# Patient Record
Sex: MALE | Race: WHITE | NOT HISPANIC OR LATINO | Employment: FULL TIME | URBAN - METROPOLITAN AREA
[De-identification: names, ages, dates, MRNs, and addresses within clinical notes are randomized per-mention and may not be internally consistent; named-entity substitution may affect disease eponyms.]

---

## 2017-05-18 ENCOUNTER — LAB VISIT (OUTPATIENT)
Dept: LAB | Facility: HOSPITAL | Age: 57
End: 2017-05-18
Attending: UROLOGY
Payer: COMMERCIAL

## 2017-05-18 ENCOUNTER — OFFICE VISIT (OUTPATIENT)
Dept: UROLOGY | Facility: CLINIC | Age: 57
End: 2017-05-18
Payer: COMMERCIAL

## 2017-05-18 VITALS
HEIGHT: 74 IN | WEIGHT: 260.13 LBS | DIASTOLIC BLOOD PRESSURE: 82 MMHG | SYSTOLIC BLOOD PRESSURE: 136 MMHG | HEART RATE: 103 BPM | BODY MASS INDEX: 33.38 KG/M2

## 2017-05-18 DIAGNOSIS — N40.0 BPH WITHOUT URINARY OBSTRUCTION: ICD-10-CM

## 2017-05-18 DIAGNOSIS — R97.20 ELEVATED PSA: ICD-10-CM

## 2017-05-18 DIAGNOSIS — R79.89 LOW TESTOSTERONE: ICD-10-CM

## 2017-05-18 DIAGNOSIS — N52.9 IMPOTENCE: ICD-10-CM

## 2017-05-18 DIAGNOSIS — R97.20 ELEVATED PSA: Primary | ICD-10-CM

## 2017-05-18 LAB
BILIRUB SERPL-MCNC: ABNORMAL MG/DL
BLOOD URINE, POC: ABNORMAL
COLOR, POC UA: YELLOW
COMPLEXED PSA SERPL-MCNC: 4.6 NG/ML
GLUCOSE UR QL STRIP: 1000
KETONES UR QL STRIP: ABNORMAL
LEUKOCYTE ESTERASE URINE, POC: ABNORMAL
NITRITE, POC UA: ABNORMAL
PH, POC UA: 6
PROTEIN, POC: ABNORMAL
SPECIFIC GRAVITY, POC UA: 1.01
UROBILINOGEN, POC UA: ABNORMAL

## 2017-05-18 PROCEDURE — 99204 OFFICE O/P NEW MOD 45 MIN: CPT | Mod: 25,S$GLB,, | Performed by: UROLOGY

## 2017-05-18 PROCEDURE — 84153 ASSAY OF PSA TOTAL: CPT

## 2017-05-18 PROCEDURE — 36415 COLL VENOUS BLD VENIPUNCTURE: CPT | Mod: PO

## 2017-05-18 PROCEDURE — 1160F RVW MEDS BY RX/DR IN RCRD: CPT | Mod: S$GLB,,, | Performed by: UROLOGY

## 2017-05-18 PROCEDURE — 99999 PR PBB SHADOW E&M-NEW PATIENT-LVL II: CPT | Mod: PBBFAC,,, | Performed by: UROLOGY

## 2017-05-18 PROCEDURE — 81002 URINALYSIS NONAUTO W/O SCOPE: CPT | Mod: S$GLB,,, | Performed by: UROLOGY

## 2017-05-18 RX ORDER — VALSARTAN 160 MG/1
160 TABLET ORAL DAILY
COMMUNITY
Start: 2017-05-12

## 2017-05-18 RX ORDER — INSULIN ASPART 100 [IU]/ML
INJECTION, SOLUTION INTRAVENOUS; SUBCUTANEOUS
COMMUNITY
Start: 2017-02-09 | End: 2017-07-19

## 2017-05-18 RX ORDER — GLIMEPIRIDE 4 MG/1
TABLET ORAL
COMMUNITY
Start: 2017-02-22 | End: 2022-05-25 | Stop reason: ALTCHOICE

## 2017-05-18 RX ORDER — LANCETS 28 GAUGE
EACH MISCELLANEOUS
COMMUNITY
Start: 2017-05-18 | End: 2022-07-20

## 2017-05-18 RX ORDER — LORAZEPAM 0.5 MG/1
0.5 TABLET ORAL EVERY 6 HOURS PRN
COMMUNITY
End: 2022-05-25 | Stop reason: ALTCHOICE

## 2017-05-18 RX ORDER — TESTOSTERONE CYPIONATE 200 MG/ML
200 INJECTION, SOLUTION INTRAMUSCULAR
COMMUNITY
Start: 2017-04-10 | End: 2017-05-25

## 2017-05-18 RX ORDER — ONDANSETRON HYDROCHLORIDE 8 MG/1
8 TABLET, FILM COATED ORAL
COMMUNITY
End: 2017-06-14

## 2017-05-18 RX ORDER — ATOMOXETINE HYDROCHLORIDE 80 MG/1
CAPSULE ORAL
COMMUNITY
Start: 2017-04-05 | End: 2022-05-25 | Stop reason: ALTCHOICE

## 2017-05-18 RX ORDER — BUPROPION HYDROCHLORIDE 300 MG/1
TABLET ORAL
COMMUNITY
Start: 2017-03-01 | End: 2022-05-25 | Stop reason: ALTCHOICE

## 2017-05-18 RX ORDER — CANAGLIFLOZIN 300 MG/1
TABLET, FILM COATED ORAL
COMMUNITY
Start: 2017-02-27 | End: 2022-05-25 | Stop reason: ALTCHOICE

## 2017-05-18 RX ORDER — BLOOD-GLUCOSE METER
KIT MISCELLANEOUS
COMMUNITY
Start: 2017-05-18 | End: 2022-07-20

## 2017-05-18 RX ORDER — DULAGLUTIDE 0.75 MG/.5ML
INJECTION, SOLUTION SUBCUTANEOUS
COMMUNITY
Start: 2017-03-22 | End: 2017-05-18

## 2017-05-18 RX ORDER — PEN NEEDLE, DIABETIC 31 GX5/16"
NEEDLE, DISPOSABLE MISCELLANEOUS
COMMUNITY
Start: 2017-05-18 | End: 2022-07-20

## 2017-05-18 RX ORDER — INSULIN DETEMIR 100 [IU]/ML
24 INJECTION, SOLUTION SUBCUTANEOUS 2 TIMES DAILY
COMMUNITY
Start: 2017-03-27 | End: 2017-10-16

## 2017-05-18 RX ORDER — INDOMETHACIN 75 MG/1
CAPSULE, EXTENDED RELEASE ORAL
COMMUNITY
Start: 2017-05-15 | End: 2022-05-25 | Stop reason: ALTCHOICE

## 2017-05-18 NOTE — LETTER
May 18, 2017      Jesu Chaparro MD  [address] [facility]  [address]  [facility] [address]           [facility]  [address] [facility] [address] [place] [address]  Phone: [phone]          Patient: Rickey Rios   MR Number: [id number]   YOB: 1960   Date of Visit: 5/18/2017       Dear Dr. Jesu Chaparro:    Thank you for referring Rickey Rios to me for evaluation. Attached you will find relevant portions of my assessment and plan of care.    If you have questions, please do not hesitate to call me. I look forward to following Rickey Rios along with you.    Sincerely,    TORITO Tobias MD    Enclosure  CC:  No Recipients    If you would like to receive this communication electronically, please contact [email or web address] or [phone] to request more information on [employer] Link access.    For providers and/or their staff who would like to refer a patient to [facility], please contact us through our one-stop-shop provider referral line, [facility] , at [phone].    If you feel you have received this communication in error or would no longer like to receive these types of communications, please e-mail [email or web address]

## 2017-05-18 NOTE — PROGRESS NOTES
"Subjective:       Patient ID: Rickey Rios is a 56 y.o. male.    Chief Complaint: No chief complaint on file.    HPI     56 year old with elevated PSA.  His PSA increased from 1.4 to 13.6 in last 6 months.  He started testosterone injections approximately 6 months ago.  Current dose is 200 mg every 2 weeks.  Treated by Dr. Chaparro.  He has no family history of prostate cancer.   He has no bothersome voiding symptoms.  He denies hematuria and dysuria.  No previous urinary tract infection.  UA is clear today.  He also has ED.  No previous PDE5i.  He takes no nitrates.    Urine dipstick shows negative for all components.    PMH:  Diabetes,  Ankylosing spondylitis      Current Outpatient Prescriptions:     BD ULTRA-FINE BERNARDO PEN NEEDLES 32 gauge x 5/32" Ndle, , Disp: , Rfl:     buPROPion (WELLBUTRIN XL) 300 MG 24 hr tablet, , Disp: , Rfl:     FREESTYLE LANCETS 28 gauge lancets, , Disp: , Rfl:     FREESTYLE LITE STRIPS Strp, , Disp: , Rfl:     glimepiride (AMARYL) 4 MG tablet, , Disp: , Rfl:     indomethacin (INDOCIN SR) 75 mg CpSR CR capsule, , Disp: , Rfl:     INVOKANA 300 mg Tab tablet, , Disp: , Rfl:     LEVEMIR FLEXTOUCH 100 unit/mL (3 mL) InPn pen, , Disp: , Rfl:     lorazepam (ATIVAN) 0.5 MG tablet, Take 0.5 mg by mouth every 6 (six) hours as needed for Anxiety., Disp: , Rfl:     NOVOLOG FLEXPEN 100 unit/mL InPn pen, , Disp: , Rfl:     ondansetron (ZOFRAN) 8 MG tablet, Take 8 mg by mouth every 8 (eight) hours., Disp: , Rfl:     STRATTERA 80 mg capsule, , Disp: , Rfl:     testosterone cypionate (DEPOTESTOTERONE CYPIONATE) 200 mg/mL injection, Inject 200 mg into the muscle every 14 (fourteen) days. , Disp: , Rfl:     valsartan (DIOVAN) 160 MG tablet, , Disp: , Rfl:     Review of Systems   Constitutional: Negative for fever.   Eyes: Negative for visual disturbance.   Respiratory: Negative for shortness of breath.    Cardiovascular: Negative for chest pain.   Gastrointestinal: Negative for nausea. "   Genitourinary: Negative for dysuria and hematuria.   Musculoskeletal: Negative for gait problem.   Skin: Negative for rash.   Neurological: Negative for seizures.   Psychiatric/Behavioral: Negative for confusion.       Objective:      Physical Exam   Constitutional: He is oriented to person, place, and time. He appears well-developed and well-nourished.   HENT:   Head: Normocephalic and atraumatic.   Eyes: Conjunctivae are normal.   Cardiovascular: Normal rate.    Pulmonary/Chest: Effort normal.   Abdominal: Hernia confirmed negative in the right inguinal area and confirmed negative in the left inguinal area.   Genitourinary: Testes normal and penis normal. Rectal exam shows no mass and anal tone normal. Prostate is enlarged (40g s/s/a). Prostate is not tender.   Musculoskeletal: Normal range of motion. He exhibits no edema.   Lymphadenopathy: No inguinal adenopathy noted on the right or left side.   Neurological: He is alert and oriented to person, place, and time.   Skin: Skin is warm and dry. No rash noted.   Psychiatric: He has a normal mood and affect.   Vitals reviewed.      Assessment:       1. Elevated PSA    2. BPH without urinary obstruction    3. Low testosterone    4. Impotence        Plan:       Elevated PSA  -     POCT urine dipstick without microscope  -     Prostate Specific Antigen, Diagnostic; Future; Expected date: 5/18/17    BPH without urinary obstruction    Low testosterone    Impotence      Rise in PSA in unusual for prostate cancer.  No symptoms to suggest infection.   I will repeat PSA to r/o lab error.  If PSA is at same level, I have recommended prostate needle biopsy.  I explained the procedure.  Hold testosterone injections for now.  He is interested in a trial of Viagra but will hold until PSA problems is resolved.

## 2017-05-19 ENCOUNTER — TELEPHONE (OUTPATIENT)
Dept: UROLOGY | Facility: CLINIC | Age: 57
End: 2017-05-19

## 2017-05-19 DIAGNOSIS — R79.89 LOW TESTOSTERONE: ICD-10-CM

## 2017-05-19 DIAGNOSIS — N40.0 BENIGN PROSTATIC HYPERPLASIA, PRESENCE OF LOWER URINARY TRACT SYMPTOMS UNSPECIFIED, UNSPECIFIED MORPHOLOGY: Primary | ICD-10-CM

## 2017-05-19 DIAGNOSIS — R97.20 ELEVATED PSA: Primary | ICD-10-CM

## 2017-05-19 DIAGNOSIS — N40.0 BENIGN PROSTATIC HYPERPLASIA, PRESENCE OF LOWER URINARY TRACT SYMPTOMS UNSPECIFIED, UNSPECIFIED MORPHOLOGY: ICD-10-CM

## 2017-05-19 RX ORDER — SILDENAFIL CITRATE 20 MG/1
20 TABLET ORAL CONTINUOUS PRN
Qty: 30 TABLET | Refills: 11 | Status: SHIPPED | OUTPATIENT
Start: 2017-05-19 | End: 2017-05-19 | Stop reason: SDUPTHER

## 2017-05-19 RX ORDER — SILDENAFIL CITRATE 20 MG/1
20 TABLET ORAL CONTINUOUS PRN
Qty: 30 TABLET | Refills: 11 | Status: SHIPPED | OUTPATIENT
Start: 2017-05-19 | End: 2018-06-13 | Stop reason: SDUPTHER

## 2017-05-19 NOTE — TELEPHONE ENCOUNTER
Called the prescription into [facility]. It was sent to [facility] by mistake. Spoke to pt's wife and informed her of the medication being at [facility] now.

## 2017-05-19 NOTE — TELEPHONE ENCOUNTER
----- Message from Bruno Ortega sent at 5/19/2017  1:10 PM CDT -----  Contact: Rickey  Call back to [phone]. Says he is at [facility] and they do not have anything.

## 2017-05-19 NOTE — TELEPHONE ENCOUNTER
----- Message from TORITO Tobias MD sent at 5/19/2017  8:41 AM CDT -----  Call with PSA.  Decreased but still elevated.  Rec.  Repeat PSA in 2 months.  Continue to hold testosterone injections for now.

## 2017-05-19 NOTE — TELEPHONE ENCOUNTER
Spoke to pt and informed him of his test results pt verbalized understanding. Pt would like to get a prescription for the Revatio 20 mg tablets from [facility]. Pt states he spoke to you about this. Please advise.

## 2017-05-19 NOTE — TELEPHONE ENCOUNTER
----- Message from Michele Roque sent at 5/19/2017  3:03 PM CDT -----  Contact: Patient  Patient returning call. Please call back at [phone]. Thanks,

## 2017-05-25 ENCOUNTER — OFFICE VISIT (OUTPATIENT)
Dept: UROLOGY | Facility: CLINIC | Age: 57
End: 2017-05-25
Payer: COMMERCIAL

## 2017-05-25 VITALS
DIASTOLIC BLOOD PRESSURE: 93 MMHG | WEIGHT: 258.63 LBS | SYSTOLIC BLOOD PRESSURE: 141 MMHG | HEART RATE: 98 BPM | HEIGHT: 74 IN | BODY MASS INDEX: 33.19 KG/M2

## 2017-05-25 DIAGNOSIS — N48.6 PEYRONIE'S DISEASE: Primary | ICD-10-CM

## 2017-05-25 PROCEDURE — 99999 PR PBB SHADOW E&M-EST. PATIENT-LVL II: CPT | Mod: PBBFAC,,, | Performed by: UROLOGY

## 2017-05-25 PROCEDURE — 99213 OFFICE O/P EST LOW 20 MIN: CPT | Mod: S$GLB,,, | Performed by: UROLOGY

## 2017-05-25 NOTE — PROGRESS NOTES
Subjective:       Patient ID: Rickey Rios is a 56 y.o. male.    Chief Complaint: Lump (in testicle)    HPI     56 year old seen recently for elevated PSA.  Exam at that time was unremarkable.  He has a new complaint.  He feels a knot at the base of his penis.  He first noticed only last noght.  It is non painful.  He does note some curvature of his penis but this has been a chronic problem.  Erections are not painful.  Can have satisfactory intercourse.    Review of Systems   Constitutional: Negative for fever.   Genitourinary: Negative for dysuria and hematuria.       Objective:      Physical Exam   Constitutional: He is oriented to person, place, and time. He appears well-developed and well-nourished.   Pulmonary/Chest: Effort normal.   Abdominal: Soft.   Genitourinary: Testes normal.   Genitourinary Comments: Palpable plaque at base of penis on right side   Lymphadenopathy: No inguinal adenopathy noted on the right side.   Neurological: He is alert and oriented to person, place, and time.   Skin: No rash noted.   Psychiatric: He has a normal mood and affect.   Vitals reviewed.      Assessment:       1. Peyronie's disease        Plan:       Peyronie's disease        Discussed treatment options.  Symptoms are mild.  Will observe for now.  Keep f/u as planned for repeat PSA.

## 2017-06-14 PROBLEM — E11.9 TYPE 2 DIABETES MELLITUS WITHOUT COMPLICATION, WITHOUT LONG-TERM CURRENT USE OF INSULIN: Status: RESOLVED | Noted: 2017-06-14 | Resolved: 2017-06-14

## 2017-06-14 PROBLEM — Z79.4 TYPE 2 DIABETES MELLITUS WITHOUT COMPLICATION, WITH LONG-TERM CURRENT USE OF INSULIN: Status: ACTIVE | Noted: 2017-06-14

## 2017-06-14 PROBLEM — I10 ESSENTIAL HYPERTENSION: Status: ACTIVE | Noted: 2017-06-14

## 2017-06-14 PROBLEM — M51.9 LUMBAR DISC DISEASE: Status: ACTIVE | Noted: 2017-06-14

## 2017-06-14 PROBLEM — E11.9 TYPE 2 DIABETES MELLITUS WITHOUT COMPLICATION, WITH LONG-TERM CURRENT USE OF INSULIN: Status: ACTIVE | Noted: 2017-06-14

## 2017-06-14 PROBLEM — E11.9 TYPE 2 DIABETES MELLITUS WITHOUT COMPLICATION, WITHOUT LONG-TERM CURRENT USE OF INSULIN: Status: ACTIVE | Noted: 2017-06-14

## 2017-06-14 PROBLEM — M50.90 CERVICAL DISC DISEASE: Status: ACTIVE | Noted: 2017-06-14

## 2017-07-12 ENCOUNTER — LAB VISIT (OUTPATIENT)
Dept: LAB | Facility: HOSPITAL | Age: 57
End: 2017-07-12
Attending: UROLOGY
Payer: COMMERCIAL

## 2017-07-12 DIAGNOSIS — R97.20 ELEVATED PSA: ICD-10-CM

## 2017-07-12 LAB — COMPLEXED PSA SERPL-MCNC: 5.7 NG/ML

## 2017-07-12 PROCEDURE — 84153 ASSAY OF PSA TOTAL: CPT

## 2017-07-12 PROCEDURE — 36415 COLL VENOUS BLD VENIPUNCTURE: CPT | Mod: PO

## 2017-07-19 ENCOUNTER — OFFICE VISIT (OUTPATIENT)
Dept: UROLOGY | Facility: CLINIC | Age: 57
End: 2017-07-19
Payer: COMMERCIAL

## 2017-07-19 VITALS
DIASTOLIC BLOOD PRESSURE: 80 MMHG | HEART RATE: 74 BPM | HEIGHT: 74 IN | WEIGHT: 249.13 LBS | SYSTOLIC BLOOD PRESSURE: 130 MMHG | BODY MASS INDEX: 31.97 KG/M2

## 2017-07-19 DIAGNOSIS — R97.20 ELEVATED PSA: Primary | ICD-10-CM

## 2017-07-19 DIAGNOSIS — N40.0 BENIGN PROSTATIC HYPERPLASIA, PRESENCE OF LOWER URINARY TRACT SYMPTOMS UNSPECIFIED: Primary | ICD-10-CM

## 2017-07-19 LAB
BILIRUB SERPL-MCNC: NORMAL MG/DL
BLOOD URINE, POC: NORMAL
COLOR, POC UA: YELLOW
GLUCOSE UR QL STRIP: 1000
KETONES UR QL STRIP: NORMAL
LEUKOCYTE ESTERASE URINE, POC: NORMAL
NITRITE, POC UA: NORMAL
PH, POC UA: 5
PROTEIN, POC: NORMAL
SPECIFIC GRAVITY, POC UA: 1
UROBILINOGEN, POC UA: NORMAL

## 2017-07-19 PROCEDURE — 99213 OFFICE O/P EST LOW 20 MIN: CPT | Mod: 25,S$GLB,, | Performed by: UROLOGY

## 2017-07-19 PROCEDURE — 81002 URINALYSIS NONAUTO W/O SCOPE: CPT | Mod: S$GLB,,, | Performed by: UROLOGY

## 2017-07-19 PROCEDURE — 99999 PR PBB SHADOW E&M-EST. PATIENT-LVL III: CPT | Mod: PBBFAC,,, | Performed by: UROLOGY

## 2017-07-19 RX ORDER — LEVOFLOXACIN 500 MG/1
500 TABLET, FILM COATED ORAL DAILY
Qty: 3 TABLET | Refills: 0 | Status: SHIPPED | OUTPATIENT
Start: 2017-07-19 | End: 2017-07-22

## 2017-07-19 RX ORDER — DULAGLUTIDE 0.75 MG/.5ML
INJECTION, SOLUTION SUBCUTANEOUS
COMMUNITY
Start: 2017-06-26 | End: 2017-10-16

## 2017-07-19 NOTE — PROGRESS NOTES
Subjective:       Patient ID: Rickey Rios is a 56 y.o. male.    Chief Complaint: Follow-up    HPI     56 year old with elevated PSA.  His PSA increased from 1.4 to 13.6 in last 6 months.  He started testosterone injections approximately 6 months ago.   Testosterone was held and initially PSA decreased to 4.6 but most recent PSA is increased again to 5.7.  He has no family history of prostate cancer.   He has no bothersome voiding symptoms.  He denies hematuria and dysuria.  No previous urinary tract infection.  UA is clear today.    Urine dipstick shows negative for all components.    Review of Systems   Constitutional: Negative for fever.   Genitourinary: Negative for dysuria and hematuria.       Objective:      Physical Exam   Constitutional: He is oriented to person, place, and time. He appears well-developed and well-nourished.   Pulmonary/Chest: Effort normal.   Abdominal: Soft.   Neurological: He is alert and oriented to person, place, and time.   Skin: No rash noted.   Psychiatric: He has a normal mood and affect.   Vitals reviewed.      Assessment:       1. Benign prostatic hyperplasia, presence of lower urinary tract symptoms unspecified        Plan:       Benign prostatic hyperplasia, presence of lower urinary tract symptoms unspecified  -     POCT URINE DIPSTICK WITHOUT MICROSCOPE    Other orders  -     levoFLOXacin (LEVAQUIN) 500 MG tablet; Take 1 tablet (500 mg total) by mouth once daily.  Dispense: 3 tablet; Refill: 0      Rec prostate needle biopsy.  We discussed procedure and risks.

## 2017-07-31 ENCOUNTER — ANESTHESIA EVENT (OUTPATIENT)
Dept: SURGERY | Facility: HOSPITAL | Age: 57
End: 2017-07-31
Payer: COMMERCIAL

## 2017-08-01 ENCOUNTER — ANESTHESIA (OUTPATIENT)
Dept: SURGERY | Facility: HOSPITAL | Age: 57
End: 2017-08-01
Payer: COMMERCIAL

## 2017-08-01 ENCOUNTER — HOSPITAL ENCOUNTER (OUTPATIENT)
Facility: HOSPITAL | Age: 57
Discharge: HOME | End: 2017-08-01
Attending: UROLOGY | Admitting: UROLOGY
Payer: COMMERCIAL

## 2017-08-01 DIAGNOSIS — R97.20 ELEVATED PSA: Primary | ICD-10-CM

## 2017-08-01 LAB — GLUCOSE SERPL-MCNC: 155 MG/DL (ref 70–110)

## 2017-08-01 PROCEDURE — 25000003 PHARM REV CODE 250: Mod: PO | Performed by: ANESTHESIOLOGY

## 2017-08-01 PROCEDURE — D9220A PRA ANESTHESIA: Mod: CRNA,,, | Performed by: NURSE ANESTHETIST, CERTIFIED REGISTERED

## 2017-08-01 PROCEDURE — 63600175 PHARM REV CODE 636 W HCPCS: Mod: PO | Performed by: UROLOGY

## 2017-08-01 PROCEDURE — 76942 ECHO GUIDE FOR BIOPSY: CPT | Mod: 26,59,, | Performed by: UROLOGY

## 2017-08-01 PROCEDURE — 37000008 HC ANESTHESIA 1ST 15 MINUTES: Mod: PO | Performed by: UROLOGY

## 2017-08-01 PROCEDURE — 82962 GLUCOSE BLOOD TEST: CPT | Mod: PO | Performed by: UROLOGY

## 2017-08-01 PROCEDURE — 55700 PR BIOPSY OF PROSTATE,NEEDLE/PUNCH: CPT | Mod: ,,, | Performed by: UROLOGY

## 2017-08-01 PROCEDURE — D9220A PRA ANESTHESIA: Mod: ANES,,, | Performed by: ANESTHESIOLOGY

## 2017-08-01 PROCEDURE — 88305 TISSUE EXAM BY PATHOLOGIST: CPT

## 2017-08-01 PROCEDURE — 71000033 HC RECOVERY, INTIAL HOUR: Mod: PO | Performed by: UROLOGY

## 2017-08-01 PROCEDURE — 36000705 HC OR TIME LEV I EA ADD 15 MIN: Mod: PO | Performed by: UROLOGY

## 2017-08-01 PROCEDURE — 63600175 PHARM REV CODE 636 W HCPCS: Mod: PO | Performed by: NURSE ANESTHETIST, CERTIFIED REGISTERED

## 2017-08-01 PROCEDURE — 88305 TISSUE EXAM BY PATHOLOGIST: CPT | Mod: 26,,,

## 2017-08-01 PROCEDURE — 36000704 HC OR TIME LEV I 1ST 15 MIN: Mod: PO | Performed by: UROLOGY

## 2017-08-01 PROCEDURE — 25000003 PHARM REV CODE 250: Mod: PO | Performed by: UROLOGY

## 2017-08-01 PROCEDURE — 37000009 HC ANESTHESIA EA ADD 15 MINS: Mod: PO | Performed by: UROLOGY

## 2017-08-01 PROCEDURE — 76872 US TRANSRECTAL: CPT | Mod: 26,,, | Performed by: UROLOGY

## 2017-08-01 RX ORDER — LIDOCAINE HYDROCHLORIDE 10 MG/ML
1 INJECTION, SOLUTION EPIDURAL; INFILTRATION; INTRACAUDAL; PERINEURAL ONCE
Status: DISCONTINUED | OUTPATIENT
Start: 2017-08-01 | End: 2017-08-01 | Stop reason: HOSPADM

## 2017-08-01 RX ORDER — LIDOCAINE HCL/PF 100 MG/5ML
SYRINGE (ML) INTRAVENOUS
Status: DISCONTINUED | OUTPATIENT
Start: 2017-08-01 | End: 2017-08-01

## 2017-08-01 RX ORDER — OXYCODONE AND ACETAMINOPHEN 5; 325 MG/1; MG/1
1 TABLET ORAL
Status: DISCONTINUED | OUTPATIENT
Start: 2017-08-01 | End: 2017-08-01 | Stop reason: HOSPADM

## 2017-08-01 RX ORDER — MIDAZOLAM HYDROCHLORIDE 1 MG/ML
INJECTION, SOLUTION INTRAMUSCULAR; INTRAVENOUS
Status: DISCONTINUED | OUTPATIENT
Start: 2017-08-01 | End: 2017-08-01

## 2017-08-01 RX ORDER — FENTANYL CITRATE 50 UG/ML
INJECTION, SOLUTION INTRAMUSCULAR; INTRAVENOUS
Status: DISCONTINUED | OUTPATIENT
Start: 2017-08-01 | End: 2017-08-01

## 2017-08-01 RX ORDER — SODIUM CHLORIDE 0.9 % (FLUSH) 0.9 %
3 SYRINGE (ML) INJECTION
Status: DISCONTINUED | OUTPATIENT
Start: 2017-08-01 | End: 2017-08-01 | Stop reason: HOSPADM

## 2017-08-01 RX ORDER — FENTANYL CITRATE 50 UG/ML
25 INJECTION, SOLUTION INTRAMUSCULAR; INTRAVENOUS EVERY 5 MIN PRN
Status: DISCONTINUED | OUTPATIENT
Start: 2017-08-01 | End: 2017-08-01 | Stop reason: HOSPADM

## 2017-08-01 RX ORDER — SODIUM CHLORIDE, SODIUM LACTATE, POTASSIUM CHLORIDE, CALCIUM CHLORIDE 600; 310; 30; 20 MG/100ML; MG/100ML; MG/100ML; MG/100ML
INJECTION, SOLUTION INTRAVENOUS CONTINUOUS
Status: DISCONTINUED | OUTPATIENT
Start: 2017-08-01 | End: 2017-08-01 | Stop reason: HOSPADM

## 2017-08-01 RX ORDER — SODIUM CHLORIDE 9 MG/ML
INJECTION, SOLUTION INTRAVENOUS CONTINUOUS
Status: DISCONTINUED | OUTPATIENT
Start: 2017-08-01 | End: 2017-08-01 | Stop reason: HOSPADM

## 2017-08-01 RX ORDER — PROPOFOL 10 MG/ML
VIAL (ML) INTRAVENOUS
Status: DISCONTINUED | OUTPATIENT
Start: 2017-08-01 | End: 2017-08-01

## 2017-08-01 RX ORDER — LIDOCAINE HYDROCHLORIDE 10 MG/ML
1 INJECTION, SOLUTION EPIDURAL; INFILTRATION; INTRACAUDAL; PERINEURAL ONCE
Status: COMPLETED | OUTPATIENT
Start: 2017-08-01 | End: 2017-08-01

## 2017-08-01 RX ADMIN — LIDOCAINE HYDROCHLORIDE 100 MG: 20 INJECTION PARENTERAL at 08:08

## 2017-08-01 RX ADMIN — PROPOFOL 50 MG: 10 INJECTION, EMULSION INTRAVENOUS at 08:08

## 2017-08-01 RX ADMIN — GENTAMICIN SULFATE 80 MG: 40 INJECTION, SOLUTION INTRAMUSCULAR; INTRAVENOUS at 07:08

## 2017-08-01 RX ADMIN — FENTANYL CITRATE 50 MCG: 50 INJECTION, SOLUTION INTRAMUSCULAR; INTRAVENOUS at 08:08

## 2017-08-01 RX ADMIN — SODIUM CHLORIDE, SODIUM LACTATE, POTASSIUM CHLORIDE, AND CALCIUM CHLORIDE: .6; .31; .03; .02 INJECTION, SOLUTION INTRAVENOUS at 07:08

## 2017-08-01 RX ADMIN — MIDAZOLAM HYDROCHLORIDE 2 MG: 1 INJECTION, SOLUTION INTRAMUSCULAR; INTRAVENOUS at 08:08

## 2017-08-01 NOTE — ANESTHESIA PREPROCEDURE EVALUATION
08/01/2017  Rickey Rios is a 56 y.o., male.    Anesthesia Evaluation    I have reviewed the Patient Summary Reports.    I have reviewed the Nursing Notes.   I have reviewed the Medications.     Review of Systems  Anesthesia Hx:  No problems with previous Anesthesia    Social:  Non-Smoker    Cardiovascular:   Hypertension Denies CAD.    Denies CABG/stent.   Denies Angina.    Pulmonary:   Sleep Apnea, CPAP    Musculoskeletal:   Arthritis   Spine Disorders: Degenerative disease and Disc disease    Endocrine:   Diabetes, type 2, using insulin    Psych:   anxiety adhd         Physical Exam  General:  Obesity    Airway/Jaw/Neck:  Airway Findings: Mouth Opening: Normal Mallampati: II  TM Distance: Normal, at least 6 cm  Jaw/Neck Findings:  Neck ROM: Extension Decreased, Mild       Chest/Lungs:  Chest/Lungs Findings: Clear to auscultation, Normal Respiratory Rate     Heart/Vascular:  Heart Findings: Rate: Normal  Rhythm: Regular Rhythm        Mental Status:  Mental Status Findings:  Cooperative, Alert and Oriented         Anesthesia Plan  Type of Anesthesia, risks & benefits discussed:  Anesthesia Type:  MAC  Patient's Preference:   Intra-op Monitoring Plan: standard ASA monitors  Intra-op Monitoring Plan Comments:   Post Op Pain Control Plan:   Post Op Pain Control Plan Comments:   Induction:   IV  Beta Blocker:  Patient is not currently on a Beta-Blocker (No further documentation required).       Informed Consent: Patient understands risks and agrees with Anesthesia plan.  Questions answered. Anesthesia consent signed with patient.  ASA Score: 3     Day of Surgery Review of History & Physical: I have interviewed and examined the patient. I have reviewed the patient's H&P dated:  There are no significant changes.          Ready For Surgery From Anesthesia Perspective.

## 2017-08-01 NOTE — OP NOTE
DATE OF PROCEDURE: 08/01/2017.    PREOPERATIVE DIAGNOSIS:  Elevated PSA.    POSTOPERATIVE DIAGNOSIS:  Elevated PSA.    OPERATIVE PROCEDURES:  Transrectal ultrasound, ultrasound guidance and prostate   biopsy.    ANESTHESIA:  General.    COMPLICATIONS:  None.    SPECIMEN:  Prostate biopsy.    BLOOD LOSS:  None.    INDICATIONS:  Mr. Rios is a 56-year-old male with a history of an elevated   PSA of 5.7.  I recommended a prostate needle biopsy.    PROCEDURE IN DETAIL:  After informed consent was obtained, he was brought to the   operating room.  He was given preoperative antibiotics.  After adequate   sedation was achieved, he was placed in the left lateral decubitus position.  On   digital rectal exam, the prostate was smooth, symmetrical and anodular.  The   transrectal ultrasound probe was then placed in the rectum without difficulty.    Dynamic images were obtained of the entire gland.  There was fairly homogenous   echotexture.  There was some scattered calcifications, more prominent in the   left base.  I then performed a prostate block by injecting 10 mL of 1% lidocaine   into the periprostatic spaces.  Prostate volume was calculated to be of   approximately 41.6 mL.  I then performed a standard template prostate biopsy.    Twelve cores were obtained in total.  Biopsy specimens were fully submerged in   formalin, labeled with the patient's name and sent for pathological evaluation.    I then removed the ultrasound probe.  Hemostasis appeared to be adequate.  He   tolerated the procedure well.  There were no complications.  He was awakened and   transported to the PACU in stable condition.      VENESSA/SEB  dd: 08/01/2017 08:56:39 (CDT)  td: 08/01/2017 09:44:26 (CDT)  Doc ID   #[id number]  Job ID #[id number]    CC:

## 2017-08-01 NOTE — DISCHARGE INSTRUCTIONS
Procedural Sedation (Adult)  You have been given medicine by vein to make you sleep during your surgery. This may have included both a pain medicine and sleeping medicine. Most of the effects have worn off. But you may still have some drowsiness for the next 6 to 8 hours.  Home care  Follow these guidelines when you get home:  · For the next 8 hours, you should be watched by a responsible adult. This person should make sure your condition is not getting worse.  · Don't take any medicine by mouth for pain or for sleep during the next 4 hours. These might react with the medicines you were given in the hospital. This could cause a much stronger response than usual.  · Don't drink any alcohol for the next 24 hours.  · Don't drive, operate dangerous machinery, or make important business or personal decisions during the next 24 hours.  Follow-up care  Follow up with your healthcare provider if you are not alert and back to your usual level of activity within 12 hours.  When to seek medical advice  Call your healthcare provider right away if any of these occur:  · Drowsiness gets worse  · Weakness or dizziness gets worse  · Repeated vomiting  · You cannot be awakened   Date Last Reviewed: 10/18/2016  © 4228-0437 [employer]. [address] [place] [address], [place] [address]. All rights reserved. This information is not intended as a substitute for professional medical care. Always follow your healthcare professional's instructions.        PROSTATE BIOPSY      DOS:   Minimal activity for 24 hours.   May shower or bathe today.   Advance diet as tolerated.   Drink a lot of liquids until you see your doctor.   Expect blood in urine/stool for up to 3 weeks.   Expect blood in semen for up to 8 weeks.   Resume home medications as prescribed   Biopsy results may not be available for 10-14 days    DONT:   No driving for 24 hours or while taking narcotic pain medication   No aspirin, NSAIDS or blood thinners for  7 days   No sexual intercourse, heavy lifting, or strenuous activity for 7 days.    TYLENOL/ACETAMINOPHEN  TYLENOL/ACETAMINOPHEN.    CALL PHYSICIAN FOR:   Unable to urinate within 6 hours after surgery.   Excessive bleeding.    Fever>101   Persistent pain not relieved by pain medication.    Contact your physician for emergencies at [phone]

## 2017-08-01 NOTE — DISCHARGE SUMMARY
"[facility]  Discharge Note  Short Stay    Admit Date: 8/1/2017    Discharge Date and Time: No discharge date for patient encounter.     Attending Physician: TORITO Tobias MD     Discharge Provider: NATHAN Tobias    Diagnoses:  Active Hospital Problems    Diagnosis  POA    Elevated PSA [R97.20]  Yes      Resolved Hospital Problems    Diagnosis Date Resolved POA   No resolved problems to display.       Discharged Condition: stable    Hospital Course: Patient was admitted for an outpatient procedure and tolerated the procedure well with no complications.    Final Diagnoses: Same as principal problem.    Disposition: Home or Self Care    Follow up/Patient Instructions:    Medications:  Reconciled Home Medications:   Current Discharge Medication List      CONTINUE these medications which have NOT CHANGED    Details   BD ULTRA-FINE BERNARDO PEN NEEDLES 32 gauge x 5/32" Ndle       buPROPion (WELLBUTRIN XL) 300 MG 24 hr tablet       FREESTYLE LANCETS 28 gauge lancets       FREESTYLE LITE STRIPS Strp       glimepiride (AMARYL) 4 MG tablet       indomethacin (INDOCIN SR) 75 mg CpSR CR capsule       INVOKANA 300 mg Tab tablet       LEVEMIR FLEXTOUCH 100 unit/mL (3 mL) InPn pen       lorazepam (ATIVAN) 0.5 MG tablet Take 0.5 mg by mouth every 6 (six) hours as needed for Anxiety.      STRATTERA 80 mg capsule       TRULICITY 0.75 mg/0.5 mL PnIj       valsartan (DIOVAN) 160 MG tablet       sildenafil (REVATIO) 20 mg Tab Take 1 tablet (20 mg total) by mouth continuous prn (Take 3 tablets, 1 hour prior to intercource, can take up to 5 tablets at once, but do not exceed 5 tablets.).  Qty: 30 tablet, Refills: 11    Associated Diagnoses: Benign prostatic hyperplasia, presence of lower urinary tract symptoms unspecified, unspecified morphology; Low testosterone             Discharge Procedure Orders  Diet general     Activity as tolerated     Call MD for:  temperature >100.4     Call MD for:  persistent nausea and vomiting " or diarrhea     Call MD for:  severe uncontrolled pain           Discharge Procedure Orders (must include Diet, Follow-up, Activity):    Discharge Procedure Orders (must include Diet, Follow-up, Activity)  Diet general     Activity as tolerated     Call MD for:  temperature >100.4     Call MD for:  persistent nausea and vomiting or diarrhea     Call MD for:  severe uncontrolled pain      I will call for follow up

## 2017-08-01 NOTE — TRANSFER OF CARE
"Anesthesia Transfer of Care Note    Patient: Rickey Rios    Procedure(s) Performed: Procedure(s) (LRB):  TRANSRECTAL ULTRASOUND GUIDED PROSTATE BIOPSY (N/A)    Patient location: PACU    Anesthesia Type: MAC    Transport from OR: Transported from OR on room air with adequate spontaneous ventilation    Post pain: adequate analgesia    Post assessment: no apparent anesthetic complications    Post vital signs: stable    Level of consciousness: awake    Nausea/Vomiting: no nausea/vomiting    Complications: none    Transfer of care protocol was followed      Last vitals:   Visit Vitals  /72 (BP Location: Left arm, Patient Position: Lying, BP Method: Automatic)   Pulse 77   Temp 36.4 °C (97.6 °F) (Skin)   Resp 18   Ht 6' 2" (1.88 m)   Wt 111.1 kg (245 lb)   BMI 31.46 kg/m²     "

## 2017-08-01 NOTE — OP NOTE
[facility]  Surgery Department  Operative Note    SUMMARY     Date of Procedure: 8/1/2017     Procedure: Procedure(s) (LRB):  TRANSRECTAL ULTRASOUND GUIDED PROSTATE BIOPSY (N/A)     Surgeon(s) and Role:     * TORITO Tobias MD - Primary    Assisting Surgeon: None    Pre-Operative Diagnosis: Elevated PSA [R97.20]    Post-Operative Diagnosis: Post-Op Diagnosis Codes:     * Elevated PSA [R97.20]    Anesthesia: Local MAC    Technical Procedures Used: ultrasound guidance    Description of the Findings of the Procedure: 41.6 ml gland    Significant Surgical Tasks Conducted by the Assistant(s), if Applicable: n/a    Complications: No    Estimated Blood Loss (EBL): * No values recorded between 8/1/2017  8:41 AM and 8/1/2017  8:52 AM *           Implants: * No implants in log *    Specimens:   Specimen (12h ago through future)    Start     Ordered    08/01/17 0802  Specimen to Pathology - Surgery  Once     Comments:  Pre-op Diagnosis: Elevated PSA [R97.20]Post-op Diagnosis: Procedure(s):TRANSRECTAL ULTRASOUND GUIDED PROSTATE BIOPSY Number of specimens: 6Name of specimens: Prostate Biopsies: 1. Left Base, 2. Left Mid, 3. Left [place], 4. Right Base, 5. Right Mid, 6. Right [place]      08/01/17 0849                  Condition: Stable    Disposition: PACU - hemodynamically stable.    Attestation: I performed the procedure.

## 2017-08-01 NOTE — ANESTHESIA POSTPROCEDURE EVALUATION
"Anesthesia Post Evaluation    Patient: Rickey Rios    Procedure(s) Performed: Procedure(s) (LRB):  TRANSRECTAL ULTRASOUND GUIDED PROSTATE BIOPSY (N/A)    Final Anesthesia Type: MAC  Patient location during evaluation: PACU  Patient participation: Yes- Able to Participate  Level of consciousness: awake and alert and oriented  Post-procedure vital signs: reviewed and stable  Pain management: adequate  Airway patency: patent  PONV status at discharge: No PONV  Anesthetic complications: no      Cardiovascular status: hemodynamically stable  Respiratory status: unassisted, spontaneous ventilation and room air  Hydration status: euvolemic  Follow-up not needed.        Visit Vitals  /78 (BP Location: Left arm, Patient Position: Sitting, BP Method: Automatic)   Pulse 80   Temp 36.4 °C (97.6 °F) (Skin)   Resp 18   Ht 6' 2" (1.88 m)   Wt 111.1 kg (245 lb)   SpO2 98%   BMI 31.46 kg/m²       Pain/Alyson Score: Pain Assessment Performed: Yes (8/1/2017  9:21 AM)  Presence of Pain: denies (8/1/2017  9:21 AM)  Alyson Score: 10 (8/1/2017  9:21 AM)      "

## 2017-08-01 NOTE — INTERVAL H&P NOTE
The patient has been examined and the H&P has been reviewed:    I concur with the findings and changes have been noted since the H&P was written: Plan prostate needle biopsy.  We discussed procedure and risks    Anesthesia/Surgery risks, benefits and alternative options discussed and understood by patient/family.          Active Hospital Problems    Diagnosis  POA    Elevated PSA [R97.20]  Yes      Resolved Hospital Problems    Diagnosis Date Resolved POA   No resolved problems to display.

## 2017-08-02 VITALS
SYSTOLIC BLOOD PRESSURE: 132 MMHG | BODY MASS INDEX: 31.44 KG/M2 | HEART RATE: 80 BPM | TEMPERATURE: 98 F | WEIGHT: 245 LBS | HEIGHT: 74 IN | RESPIRATION RATE: 18 BRPM | OXYGEN SATURATION: 98 % | DIASTOLIC BLOOD PRESSURE: 78 MMHG

## 2017-08-07 ENCOUNTER — TELEPHONE (OUTPATIENT)
Dept: UROLOGY | Facility: CLINIC | Age: 57
End: 2017-08-07

## 2017-08-07 NOTE — TELEPHONE ENCOUNTER
----- Message from TORITO Tobias MD sent at 8/7/2017  4:12 PM CDT -----  I discussed biopsy results.  Please call patient and schedule prostate cancer conference.

## 2017-08-08 NOTE — TELEPHONE ENCOUNTER
----- Message from Nikki Gonzalez sent at 8/8/2017  9:19 AM CDT -----  Returning your call.  Please call [phone]

## 2017-08-17 ENCOUNTER — OFFICE VISIT (OUTPATIENT)
Dept: UROLOGY | Facility: CLINIC | Age: 57
End: 2017-08-17
Payer: COMMERCIAL

## 2017-08-17 ENCOUNTER — TELEPHONE (OUTPATIENT)
Dept: UROLOGY | Facility: CLINIC | Age: 57
End: 2017-08-17

## 2017-08-17 VITALS
DIASTOLIC BLOOD PRESSURE: 89 MMHG | WEIGHT: 246.94 LBS | SYSTOLIC BLOOD PRESSURE: 127 MMHG | HEIGHT: 74 IN | HEART RATE: 89 BPM | BODY MASS INDEX: 31.69 KG/M2

## 2017-08-17 DIAGNOSIS — C61 PROSTATE CANCER: Primary | ICD-10-CM

## 2017-08-17 PROCEDURE — 3074F SYST BP LT 130 MM HG: CPT | Mod: S$GLB,,, | Performed by: UROLOGY

## 2017-08-17 PROCEDURE — 3079F DIAST BP 80-89 MM HG: CPT | Mod: S$GLB,,, | Performed by: UROLOGY

## 2017-08-17 PROCEDURE — 99214 OFFICE O/P EST MOD 30 MIN: CPT | Mod: S$GLB,,, | Performed by: UROLOGY

## 2017-08-17 PROCEDURE — 3008F BODY MASS INDEX DOCD: CPT | Mod: S$GLB,,, | Performed by: UROLOGY

## 2017-08-17 PROCEDURE — 99999 PR PBB SHADOW E&M-EST. PATIENT-LVL III: CPT | Mod: PBBFAC,,, | Performed by: UROLOGY

## 2017-08-17 NOTE — TELEPHONE ENCOUNTER
----- Message from Lidia Jones sent at 8/17/2017  4:29 PM CDT -----  Contact: pt  Pt returning call...[phone]

## 2017-08-17 NOTE — PROGRESS NOTES
Subjective:       Patient ID: Rickey Rios is a 56 y.o. male.    Chief Complaint: Follow-up    HPI     56-year-old with newly diagnosed T1c [place] 3+3 prostate cancer. His PSA was 5.7.  2 of 12 cores positive both in left base. He is seen today with his wife. We discussed all treatment options for prostate Including active surveillance, surgery, radiation therapy and androgen deprivation therapy. We discussed complications of each treatment including erectile dysfunction and possibly urinary incontinence. I answered all questions to his satisfaction. He has no erectile dysfunction now.  I spent 30 minutes with the patient. Over 50% of the visit was spent in counseling.    Review of Systems   Constitutional: Negative for fever.   Genitourinary: Negative for dysuria and hematuria.       Objective:      Physical Exam   Constitutional: He is oriented to person, place, and time. He appears well-developed and well-nourished.   Pulmonary/Chest: Effort normal.   Abdominal: Soft.   Neurological: He is alert and oriented to person, place, and time.   Skin: No rash noted.   Psychiatric: He has a normal mood and affect.   Vitals reviewed.      Assessment:       1. Prostate cancer        Plan:       Prostate cancer      I recommend radical prostatectomy.  Will refer for RALP

## 2017-08-21 ENCOUNTER — TELEPHONE (OUTPATIENT)
Dept: UROLOGY | Facility: CLINIC | Age: 57
End: 2017-08-21

## 2017-08-21 NOTE — TELEPHONE ENCOUNTER
----- Message from Clara Adorno sent at 8/21/2017 10:25 AM CDT -----  Contact: Shalini Rios (Spouse), [phone]  Returning call for [place]  Call back on # [phone]  thanks

## 2017-08-21 NOTE — TELEPHONE ENCOUNTER
Spoke to pt's wife and the message they received was a duplicate to let them know about the apt with dr van.

## 2017-08-24 ENCOUNTER — OFFICE VISIT (OUTPATIENT)
Dept: UROLOGY | Facility: CLINIC | Age: 57
End: 2017-08-24
Payer: COMMERCIAL

## 2017-08-24 VITALS
HEIGHT: 74 IN | BODY MASS INDEX: 32.53 KG/M2 | HEART RATE: 100 BPM | DIASTOLIC BLOOD PRESSURE: 82 MMHG | WEIGHT: 253.5 LBS | SYSTOLIC BLOOD PRESSURE: 137 MMHG

## 2017-08-24 DIAGNOSIS — C61 PROSTATE CANCER: ICD-10-CM

## 2017-08-24 PROCEDURE — 99999 PR PBB SHADOW E&M-EST. PATIENT-LVL III: CPT | Mod: PBBFAC,,, | Performed by: UROLOGY

## 2017-08-24 PROCEDURE — 3075F SYST BP GE 130 - 139MM HG: CPT | Mod: S$GLB,,, | Performed by: UROLOGY

## 2017-08-24 PROCEDURE — 3008F BODY MASS INDEX DOCD: CPT | Mod: S$GLB,,, | Performed by: UROLOGY

## 2017-08-24 PROCEDURE — 3079F DIAST BP 80-89 MM HG: CPT | Mod: S$GLB,,, | Performed by: UROLOGY

## 2017-08-24 PROCEDURE — 99215 OFFICE O/P EST HI 40 MIN: CPT | Mod: S$GLB,,, | Performed by: UROLOGY

## 2017-08-24 NOTE — PROGRESS NOTES
Clinic Note  8/24/2017      Subjective:         Chief Complaint:   HPI  Rickey Rios is a 56 y.o. male with recently diagnosed prostate cancer. Consult from Dr. Tobias.  Continent. erectile dysfunction an issue but not enough to take anything. Here with his wife Shalini. 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    Stage-T1C  PSAi- 5.7  Biopsy- [place] 6 left base 2/2 30%. Overall 2/12 cores positive  Volume- 42 ccs  YECENIA score- 1  Low risk disease      Lab Results   Component Value Date    PSADIAG 5.7 (H) 07/12/2017    PSADIAG 4.6 (H) 05/18/2017      Past Medical History:   Diagnosis Date    ADD (attention deficit disorder)     Anxiety     Arthritis     Diabetes mellitus, type 2     Dyslexia     Hypertension     preventative     BLAIRE on CPAP     Prostate cancer 8/24/2017     Family History   Problem Relation Age of Onset    Pulmonary fibrosis Mother     Diabetes Father     Atrial fibrillation Brother      Social History     Social History    Marital status:      Spouse name: N/A    Number of children: 4    Years of education: N/A     Occupational History    federal employee      Social History Main Topics    Smoking status: Never Smoker    Smokeless tobacco: Never Used    Alcohol use No    Drug use: No    Sexual activity: Yes     Partners: Female     Other Topics Concern    Not on file     Social History Narrative    No narrative on file     Past Surgical History:   Procedure Laterality Date    BACK SURGERY      HEEL SPUR SURGERY      NECK SURGERY       Patient Active Problem List   Diagnosis    Essential hypertension    Cervical disc disease    Lumbar disc disease    Type 2 diabetes mellitus without complication, with long-term current use of insulin    Prostate cancer     Review of Systems   Constitutional: Negative for appetite change, chills, fatigue, fever and unexpected weight change.   HENT: Negative for nosebleeds.    Respiratory: Negative for chest tightness, shortness of  "breath and wheezing.    Cardiovascular: Negative for chest pain, palpitations and leg swelling.   Gastrointestinal: Negative for abdominal distention, abdominal pain, constipation, diarrhea, nausea and vomiting.   Genitourinary: Negative for dysuria and hematuria.   Musculoskeletal: Negative for arthralgias and back pain.   Skin: Negative for pallor.   Neurological: Negative for dizziness, seizures and syncope.   Hematological: Negative for adenopathy.   Psychiatric/Behavioral: Negative for dysphoric mood.         Objective:      /82 (BP Location: Right arm, Patient Position: Sitting, BP Method: Medium (Automatic))   Pulse 100   Ht 6' 2" (1.88 m)   Wt 115 kg (253 lb 8.5 oz)   BMI 32.55 kg/m²   Estimated body mass index is 32.55 kg/m² as calculated from the following:    Height as of this encounter: 6' 2" (1.88 m).    Weight as of this encounter: 115 kg (253 lb 8.5 oz).  Physical Exam   Constitutional: He is oriented to person, place, and time. He appears well-developed and well-nourished. No distress.   HENT:   Head: Atraumatic.   Neck: No tracheal deviation present.   Cardiovascular: Normal rate.    Pulmonary/Chest: Effort normal. No respiratory distress. He has no wheezes.   Abdominal: Soft. Bowel sounds are normal. He exhibits no distension and no mass. There is no tenderness. There is no rebound and no guarding.   Genitourinary: Rectum normal and prostate normal. Rectal exam shows no external hemorrhoid, no internal hemorrhoid, no mass and no tenderness.         Genitourinary Comments: Right plaque and right epididymal cyst    Prostate 45 ccs   Neurological: He is alert and oriented to person, place, and time.   Skin: Skin is warm and dry. He is not diaphoretic.     Psychiatric: He has a normal mood and affect. His behavior is normal. Judgment and thought content normal.         Assessment and Plan:           Problem List Items Addressed This Visit     Prostate cancer      Other Visit Diagnoses  "   None.         Follow up:   Today's visit was spent almost entirely on counseling. We reviewed his diagnosis, stage, grade, risk group, and prognosis. We discussed [place] and YECENIA risk stratification We reviewed the [facility] nomogram. We discussed the concept of low risk, moderate risk, and high risk disease. We discussed the different treatment options including active surveillance (as well as the surveillance protocol), prostate brachytherapy, IMRT, IGRT, cryotherapy, HIFU and both open and robotic prostatectomy.We also discussed the advantages, disadvantages, risks and benefits, as well as complications of each option. Regarding radiation therapy we discussed treatment planning, the different techniques, short and long term complications. These included radiation cystitis, radiation proctitis, and impotence. We discussed success, failure, and salvage therapeutic options. We discussed surgical therapy in depth including preoperative preparation, surgical technique (including bladder neck and nerve-sparing techniques), postoperative recuperation and recovery, and short and long term complications including UTI, bleeding, blood clots,catheter dislodgement, etc. We discussed the risks of reoperation, incontinence, impotence, and recurrence. We discussed preop and postop Kegels, post op penile rehab, and treatment options for incontinence and impotence. We discussed rates of cancer free survival and recurrence, as well as salvage therapeutic options. We discussed the possible  indications for adjuvant radiation therapy. I answered questions and addressed concerns.  Recommend active surveillance. Patient interested in robotic assisted laparoscopic prostatectomy.  As young as he is with a 13 year old son I certainly understand his motivation for cure.  I spent 30 minutes with the patient. Over 50% of the visit was spent in counseling.   Letter to Dr. Tobias.  My nurse will instruct the patient on Kegel  exercises today and give them the Kegel exercise instruction sheet.   The patient will meet with our  Arlyn today to find a date for surgery and begin preparation for surgery. Will also receive our handout on NPO guidelines and preop hydration. Patient will also receive information and education on preoperative skin preparation and postop wound care.      Tariq Burns

## 2017-08-24 NOTE — LETTER
August 24, 2017        TORITO Tobias MD  [address] [facility] [address]  [facility] [address]             [facility]  [address]  [place] [address]  Phone: [phone]   Patient: Rickey Rios   MR Number: [id number]   YOB: 1960   Date of Visit: 8/24/2017       Dear Dr. Tobias:    Thank you for referring Rickey Rios to me for evaluation. Attached you will find relevant portions of my assessment and plan of care.    If you have questions, please do not hesitate to call me. I look forward to following Rickey Rios along with you.    Sincerely,      Tariq Burns MD            CC  No Recipients    Enclosure

## 2017-08-25 ENCOUNTER — TELEPHONE (OUTPATIENT)
Dept: UROLOGY | Facility: CLINIC | Age: 57
End: 2017-08-25

## 2017-08-25 DIAGNOSIS — C61 PROSTATE CANCER: Primary | ICD-10-CM

## 2017-08-30 ENCOUNTER — ANESTHESIA EVENT (OUTPATIENT)
Dept: SURGERY | Facility: HOSPITAL | Age: 57
DRG: 708 | End: 2017-08-30
Payer: COMMERCIAL

## 2017-08-30 DIAGNOSIS — I10 ESSENTIAL HYPERTENSION: Primary | ICD-10-CM

## 2017-08-30 DIAGNOSIS — Z79.4 TYPE 2 DIABETES MELLITUS WITHOUT COMPLICATION, WITH LONG-TERM CURRENT USE OF INSULIN: ICD-10-CM

## 2017-08-30 DIAGNOSIS — E11.9 TYPE 2 DIABETES MELLITUS WITHOUT COMPLICATION, WITH LONG-TERM CURRENT USE OF INSULIN: ICD-10-CM

## 2017-08-30 DIAGNOSIS — C61 PROSTATE CANCER: ICD-10-CM

## 2017-08-30 NOTE — ANESTHESIA PREPROCEDURE EVALUATION
Pre Admission Screening  Broderick Chin, RN    55 yo pt with recently diagnosed prostate CA.  History significant for HTN, BLAIRE - CPAP at home, DM type 2.  He has significant anesthesia history - in he has  severe reaction to Phenergan and Zofran - Resulting in severe facial edema and hives.      He reports having received reglan previously without issue.    Past Medical History:   Diagnosis Date    ADD (attention deficit disorder)     Anxiety     Arthritis     Diabetes mellitus, type 2 - long term insulin use (severe allergy to metformin)     Dyslexia     Hypertension     preventative     BLAIRE on CPAP     Prostate cancer 8/24/2017   Ankylosing Spondylitis       Past Surgical History:   Procedure Laterality Date    BACK SURGERY      HEEL SPUR SURGERY      NECK SURGERY              []Hide copied text  []Hover for attribution information  Anesthesia Assessment: Preoperative EQUATION     Planned Procedure: Procedure(s) (LRB):  ROBOTIC ASSISTED LAPAROSCOPIC PROSTATECTOMY (N/A)  Requested Anesthesia Type:General  Surgeon: Tariq Burns MD  Service: Urology  Known or anticipated Date of Surgery:9/8/2017     Surgeon notes: reviewed     Triage considerations:      The patient has no apparent active cardiac condition (No unstable coronary Syndrome such as severe unstable angina or recent [<1 month] myocardial infarction, decompensated CHF, severe valvular   disease or significant arrhythmia)     Previous anesthesia records:None     Last PCP note: within 3 months , within [facility]         Other important co-morbidities: DM 2/HTN/Prostate Cancer/Lumbar disc disease/Cervical disc disease     Tests already available:  No recent tests.                                                Instructions given. (See in Nurse's note)     Optimization:  Anesthesia Preop Clinic Assessment  Indicated    Medical Opinion Indicated                                                  Plan:              Testing:  Hematology Profile,  BMP, A1C, T&S and EKG   Pre-anesthesia  visit                                                                      Visit focus: concerns in complex and/or prolonged anesthesia, airway concerns                                               Consultation:Patient's PCP for a statement of optimization - in basket message sent                                                   Navigation: Tests Scheduled.                                                         Results will be tracked by Preop Clinic.                                           Electronically signed by Broderick Chin RN at 8/30/2017  2:32 PM        Pre-admit on 9/8/2017            Detailed Report        8/31/17-Pt calling PCP for clearance appt.      Obtained OS records from [facility] regarding pt allergic reaction to Phenergan/Zofran/Metformin in ER approx 3yrs ago. Reviewed by Dr Gatica and scanned to media.  9/1/17-labs and EKG results reviewed  9/7/17- clearance note obtained and scanned to media                                                                                                                  08/30/2017  Rickey Rios is a 56 y.o., male.    Pre-op Assessment    I have reviewed the Patient Summary Reports.     I have reviewed the Nursing Notes.   I have reviewed the Medications.     Review of Systems

## 2017-08-30 NOTE — PRE ADMISSION SCREENING
Anesthesia Assessment: Preoperative EQUATION    Planned Procedure: Procedure(s) (LRB):  ROBOTIC ASSISTED LAPAROSCOPIC PROSTATECTOMY (N/A)  Requested Anesthesia Type:General  Surgeon: Tariq Burns MD  Service: Urology  Known or anticipated Date of Surgery:9/8/2017    Surgeon notes: reviewed    Triage considerations:     The patient has no apparent active cardiac condition (No unstable coronary Syndrome such as severe unstable angina or recent [<1 month] myocardial infarction, decompensated CHF, severe valvular   disease or significant arrhythmia)    Previous anesthesia records:None    Last PCP note: within 3 months , within [facility]       Other important co-morbidities: DM 2/HTN/Prostate Cancer/Lumbar disc disease/Cervical disc disease     Tests already available:  No recent tests.            Instructions given. (See in Nurse's note)    Optimization:  Anesthesia Preop Clinic Assessment  Indicated    Medical Opinion Indicated         Plan:    Testing:  Hematology Profile, BMP, A1C, T&S and EKG   Pre-anesthesia  visit       Visit focus: concerns in complex and/or prolonged anesthesia, airway concerns     Consultation:Patient's PCP for a statement of optimization - in basket message sent        Navigation: Tests Scheduled.                         Results will be tracked by Preop Clinic.

## 2017-08-31 ENCOUNTER — HOSPITAL ENCOUNTER (OUTPATIENT)
Dept: CARDIOLOGY | Facility: CLINIC | Age: 57
Discharge: HOME | End: 2017-08-31
Attending: ANESTHESIOLOGY
Payer: COMMERCIAL

## 2017-08-31 ENCOUNTER — OFFICE VISIT (OUTPATIENT)
Dept: UROLOGY | Facility: CLINIC | Age: 57
End: 2017-08-31
Payer: COMMERCIAL

## 2017-08-31 ENCOUNTER — HOSPITAL ENCOUNTER (OUTPATIENT)
Dept: PREADMISSION TESTING | Facility: HOSPITAL | Age: 57
Discharge: HOME | End: 2017-08-31
Attending: ANESTHESIOLOGY
Payer: COMMERCIAL

## 2017-08-31 VITALS
BODY MASS INDEX: 32.2 KG/M2 | HEIGHT: 74 IN | SYSTOLIC BLOOD PRESSURE: 119 MMHG | RESPIRATION RATE: 18 BRPM | HEART RATE: 98 BPM | DIASTOLIC BLOOD PRESSURE: 77 MMHG | OXYGEN SATURATION: 95 % | WEIGHT: 250.88 LBS | TEMPERATURE: 97 F

## 2017-08-31 DIAGNOSIS — I10 ESSENTIAL HYPERTENSION: ICD-10-CM

## 2017-08-31 DIAGNOSIS — C61 PROSTATE CANCER: Primary | ICD-10-CM

## 2017-08-31 PROCEDURE — 93000 ELECTROCARDIOGRAM COMPLETE: CPT | Mod: S$GLB,,, | Performed by: INTERNAL MEDICINE

## 2017-08-31 PROCEDURE — 99499 UNLISTED E&M SERVICE: CPT | Mod: S$GLB,,, | Performed by: ANESTHESIOLOGY

## 2017-08-31 RX ORDER — SCOLOPAMINE TRANSDERMAL SYSTEM 1 MG/1
1 PATCH, EXTENDED RELEASE TRANSDERMAL
Status: DISCONTINUED | OUTPATIENT
Start: 2017-08-31 | End: 2017-09-01 | Stop reason: HOSPADM

## 2017-08-31 NOTE — PROGRESS NOTES
Urology ([facility]) H&P for upcoming procedure  Staff:  Dr. Tariq Burns MD    Is this patient in a research study?  No    CC: prostate adencarcinoma    HPI:  Rickey Rios is a 56 y.o. male with yA6pAhDt [place] 3+3=6 prostate adenocarcinoma.  He also has ankylosing spondylitis.    The patient initially presented with elevated PSA.  TRUS biopsy revealed the following:       LEFT               RIGHT  BASE   [place] 3+3 2/2 30% No evidence of malignancy  MID   No evidence of malignancy No evidence of malignancy  APEX   No evidence of malignancy No evidence of malignancy    Date of Biopsy: 8/1/2017  PSA: 5.7  Volume: 42 cc  Voiding complaints: absent  ED: present  Incontinence: absent    ROS:  Neg except per HPI, specifically no bone pain, no unintentional weight loss, no anorexia, no night sweats    Past Medical History:   Diagnosis Date    ADD (attention deficit disorder)     Anxiety     Arthritis     Diabetes mellitus, type 2     Dyslexia     Hypertension     preventative     BLAIRE on CPAP     Prostate cancer 8/24/2017       Past Surgical History:   Procedure Laterality Date    BACK SURGERY      HEEL SPUR SURGERY      NECK SURGERY         Social History     Social History    Marital status:      Spouse name: N/A    Number of children: 4    Years of education: N/A     Occupational History    federal employee      Social History Main Topics    Smoking status: Never Smoker    Smokeless tobacco: Never Used    Alcohol use No    Drug use: No    Sexual activity: Yes     Partners: Female     Other Topics Concern    Not on file     Social History Narrative    No narrative on file       Family History   Problem Relation Age of Onset    Pulmonary fibrosis Mother     Diabetes Father     Atrial fibrillation Brother        Review of patient's allergies indicates:   Allergen Reactions    Phenergan [promethazine] Edema     Throat/mouth swelling    Metformin Nausea And Vomiting    Zofran  "[ondansetron hcl (pf)] Hives and Edema     Throat swelling & mouth       Current Outpatient Prescriptions on File Prior to Visit   Medication Sig Dispense Refill    BD ULTRA-FINE BERNARDO PEN NEEDLES 32 gauge x 5/32" Ndle       buPROPion (WELLBUTRIN XL) 300 MG 24 hr tablet       FREESTYLE LANCETS 28 gauge lancets       FREESTYLE LITE STRIPS Strp       glimepiride (AMARYL) 4 MG tablet       indomethacin (INDOCIN SR) 75 mg CpSR CR capsule       INVOKANA 300 mg Tab tablet       LEVEMIR FLEXTOUCH 100 unit/mL (3 mL) InPn pen 24 Units 2 (two) times daily.       lorazepam (ATIVAN) 0.5 MG tablet Take 0.5 mg by mouth every 6 (six) hours as needed for Anxiety.      sildenafil (REVATIO) 20 mg Tab Take 1 tablet (20 mg total) by mouth continuous prn (Take 3 tablets, 1 hour prior to intercource, can take up to 5 tablets at once, but do not exceed 5 tablets.). 30 tablet 11    STRATTERA 80 mg capsule       TRULICITY 0.75 mg/0.5 mL PnIj       valsartan (DIOVAN) 160 MG tablet        No current facility-administered medications on file prior to visit.        Anticoagulation:  No    Physical Exam:  AAOx3, NAD  NC/AT, EOMI, PER, sclerae anicteric, speech normal, tongue midline  Nl effort, unlabored respirations  Regular rate  Soft, non-tender, non-distended   Scars: No abdominal scars, 4 inch vertical midline low back scar  Prostate 40g smooth, no nodules    Labs:  Urine dipstick today shows negative for all components.    Lab Results   Component Value Date    WBC 6.11 04/11/2009    HGB 14.6 04/11/2009    HCT 42.9 04/11/2009    MCV 84.6 04/11/2009     04/11/2009       BMP  Lab Results   Component Value Date     04/11/2009    K 4.1 04/11/2009    CL 99 04/11/2009    CO2 29 04/11/2009    BUN 22 (H) 04/11/2009    CREATININE 0.9 04/11/2009    CALCIUM 10.1 04/11/2009       Lab Results   Component Value Date    PSADIAG 5.7 (H) 07/12/2017    PSADIAG 4.6 (H) 05/18/2017       Imaging:  None    Assessment: Rickey Rios is a " 56 y.o. male with [id number] [place] 3+3=6 prostate adenocarcinoma.      Plan:   1. To OR on 9/8/2017 for RALP with BPLND  2. Consents signed   3. I have explained the risk, benefits, and alternatives of the procedure in detail. The patient voices understanding and all questions have been answered. The patient agrees to proceed as planned.   4. EKG per Anesthesia Pre Op Clinic    Petty Carlson MD

## 2017-08-31 NOTE — DISCHARGE INSTRUCTIONS
Your surgery has been scheduled for:__________________________________________    You should report to:  ____[facility], located on the [place] side of the first floor of the           [facility] ([phone])  ____[facility], located on the [facility] side of the            Second floor of the [facility] ([phone])  ____3rd Floor [facility] located on the [facility] side of the [facility] [phone]  Please Note   - Tell your doctor if you take Aspirin, products containing Aspirin, herbal medications  or blood thinners, such as Coumadin, Ticlid, or Plavix.  (Consult your provider regarding holding or stopping before surgery).  - Arrange for someone to drive you home following surgery.  You will not be allowed to leave the surgical facility alone or drive yourself home following sedation and anesthesia.  Before Surgery  - Stop taking all herbal medications 14days prior to surgery  - No Motrin/Advil (Ibuprofen) 7 days before surgery  - No Aleve (Naproxen) 7 days before surgery  - Stop Taking Asprin, products containing Asprin _____days before surgery  - Stop taking blood thinners_______days before surgery  - Refrain from drinking alcoholic beverages for 24hours before and after surgery  - Stop or limit smoking _________days before surgery  Night before Surgery  - DO NOT EAT OR DRINK ANYTHING AFTER MIDNIGHT, INCLUDING GUM, HARD CANDY, MINTS, OR CHEWING TOBACCO.  - Take a shower or bath (shower is recommended).  Bathe with Hibiclens soap or an antibacterial soap from the neck down.  If not supplied by your surgeon, hibiclens soap will need to be purchased over the counter in pharmacy.  Rinse soap off thoroughly.  - Shampoo your hair with your regular shampoo  The Day of Surgery  - Take another bath or shower with hibiclens or any antibacterial soap, to reduce the chance of infection.  - Take heart and blood  pressure medications with a small sip of water, as advised by the perioperative team.  - Do not take fluid pills  - You may brush your teeth and rinse your mouth, but do not swall any additional water.   - Do not apply perfumes, powder, body lotions or deodorant on the day of surgery.  - Nail polish should be removed.  - Do not wear makeup or moisturizer  - Wear comfortable clothes, such as a button front shirt and loose fitting pants.  - Leave all jewelry, including body piercings, and valuables at home.    - Bring any devices you will neeed after surgery such as crutches or canes.  - If you have sleep apnea, please bring your CPAP machine  In the event that your physical condition changes including the onset of a cold or respiratory illness, or if you have to delay or cancel your surgery, please notify your surgeon.    Anesthesia: General Anesthesia  Youre due to have surgery. During surgery, youll be given medication called anesthesia. (It is also called anesthetic.) This will keep you comfortable and pain-free. Your anesthesia provider will use general anesthesia. This sheet tells you more about it.  What is general anesthesia?     You are watched continuously during your procedure by the anesthesia provider   General anesthesia puts you into a state like deep sleep. It goes into the bloodstream (IV anesthetics), into the lungs (gas anesthetics), or both. You feel nothing during the procedure. You will not remember it. During the procedure, the anesthesia provider monitors you continuously. He or she checks your heart rate and rhythm, blood pressure, breathing, and blood oxygen.  · IV Anesthetics. IV anesthetics are given through an IV line in your arm. Theyre often given first. This is so you are asleep before a gas anesthetic is started. Some kinds of IV anesthetics relieve pain. Others relax you. Your doctor will decide which kind is best in your case.  · Gas Anesthetics. Gas anesthetics are breathed into  the lungs. They are often used to keep you asleep. They can be given through a facemask or a tube placed in your larynx or trachea (breathing tube).  ? If you have a facemask, your anesthesia provider will most likely place it over your nose and mouth while youre still awake. Youll breathe oxygen through the mask as your IV anesthetic is started. Gas anesthetic may be added through the mask.  ? If you have a tube in the larynx or trachea, it will be inserted into your throat after youre asleep.  Anesthesia tools and medications  You will likely have:  · IV anesthetics. These are put into an IV line into your bloodstream.  · Gas anesthetics. You breathe these anesthetics into your lungs, where they pass into your bloodstream.  · Pulse oximeter. This is a small clip that is attached to the end of your finger. This measures your blood oxygen level.  · Electrocardiography leads (electrodes). These are small sticky pads that are placed on your chest. They record your heart rate and rhythm.  · Blood pressure cuff. This reads your blood pressure.  Risks and possible complications  General anesthesia has some risks. These include:  · Breathing problems  · Nausea and vomiting  · Sore throat or hoarseness (usually temporary)  · Allergic reaction to the anesthetic  · Irregular heartbeat (rare)  · Cardiac arrest (rare)   Anesthesia safety  · Follow all instructions you are given for how long not to eat or drink before your procedure.  · Be sure your doctor knows what medications and drugs you take. This includes over-the-counter medications, herbs, supplements, alcohol or other drugs. You will be asked when those were last taken.  · Have an adult family member or friend drive you home after the procedure.  · For the first 24 hours after your surgery:  ? Do not drive or use heavy equipment.  ? Have a trusted family member or spouse make important decisions or sign documents.  ? Avoid alcohol.  ? Have a responsible adult stay  with you. He or she can watch for problems and help keep you safe.  Date Last Reviewed: 10/16/2014  © 8641-3954 [employer]. [address] [place] [address], [place] [address]. All rights reserved. This information is not intended as a substitute for professional medical care. Always follow your healthcare professional's instructions.

## 2017-09-07 ENCOUNTER — TELEPHONE (OUTPATIENT)
Dept: UROLOGY | Facility: CLINIC | Age: 57
End: 2017-09-07

## 2017-09-07 NOTE — ANESTHESIA PREPROCEDURE EVALUATION
09/07/2017  [facility]  Anesthesia Pre-Operative Evaluation         Patient Name: Rickey Rios  YOB: 1960  MRN: [id number]    SUBJECTIVE:     Pre-operative evaluation for Procedure(s) (LRB):  ROBOTIC ASSISTED LAPAROSCOPIC PROSTATECTOMY (N/A)     09/07/2017     55 yo pt with recently diagnosed prostate CA.  History significant for HTN, BLAIRE - CPAP at home, DM type 2.  He has significant anesthesia history - in he has  severe reaction to Phenergan and Zofran - Resulting in severe facial edema and hives.      He reports having received reglan previously without issue.    Sip of Gatorade this morning     LDA: 18 G left arm        Prev airway: None documented.    Drips: None documented.      Patient Active Problem List   Diagnosis    Essential hypertension    Cervical disc disease    Lumbar disc disease    Type 2 diabetes mellitus without complication, with long-term current use of insulin    Prostate cancer       Review of patient's allergies indicates:   Allergen Reactions    Phenergan [promethazine] Edema     Throat/mouth swelling    Metformin Nausea And Vomiting    Zofran [ondansetron hcl (pf)] Hives and Edema     Throat swelling & mouth       Current Inpatient Medications:      No current facility-administered medications on file prior to encounter.      Current Outpatient Prescriptions on File Prior to Encounter   Medication Sig Dispense Refill    buPROPion (WELLBUTRIN XL) 300 MG 24 hr tablet       glimepiride (AMARYL) 4 MG tablet       indomethacin (INDOCIN SR) 75 mg CpSR CR capsule       INVOKANA 300 mg Tab tablet       LEVEMIR FLEXTOUCH 100 unit/mL (3 mL) InPn pen 24 Units 2 (two) times daily.       lorazepam (ATIVAN) 0.5 MG tablet Take 0.5 mg by mouth every 6 (six) hours as needed for Anxiety.      STRATTERA 80 mg capsule       TRULICITY 0.75 mg/0.5 mL PnIj  "      valsartan (DIOVAN) 160 MG tablet       BD ULTRA-FINE BERNARDO PEN NEEDLES 32 gauge x 5/32" Ndle       FREESTYLE LANCETS 28 gauge lancets       FREESTYLE LITE STRIPS Strp       sildenafil (REVATIO) 20 mg Tab Take 1 tablet (20 mg total) by mouth continuous prn (Take 3 tablets, 1 hour prior to intercource, can take up to 5 tablets at once, but do not exceed 5 tablets.). 30 tablet 11       Past Surgical History:   Procedure Laterality Date    BACK SURGERY      HEEL SPUR SURGERY      NECK SURGERY         Social History     Social History    Marital status:      Spouse name: N/A    Number of children: 4    Years of education: N/A     Occupational History    federal employee      Social History Main Topics    Smoking status: Never Smoker    Smokeless tobacco: Never Used    Alcohol use No    Drug use: No    Sexual activity: Yes     Partners: Female     Other Topics Concern    Not on file     Social History Narrative    No narrative on file       OBJECTIVE:     Vital Signs Range (Last 24H):         CBC:   Lab Results   Component Value Date    WBC 6.30 08/31/2017    HGB 16.2 08/31/2017    HCT 47.1 08/31/2017    MCV 82 08/31/2017     08/31/2017     CMP:   CMP  Sodium   Date Value Ref Range Status   08/31/2017 139 136 - 145 mmol/L Final     Potassium   Date Value Ref Range Status   08/31/2017 4.6 3.5 - 5.1 mmol/L Final     Chloride   Date Value Ref Range Status   08/31/2017 100 95 - 110 mmol/L Final     CO2   Date Value Ref Range Status   08/31/2017 32 (H) 23 - 29 mmol/L Final     Glucose   Date Value Ref Range Status   08/31/2017 169 (H) 70 - 110 mg/dL Final     BUN, Bld   Date Value Ref Range Status   08/31/2017 26 (H) 6 - 20 mg/dL Final     Creatinine   Date Value Ref Range Status   08/31/2017 1.2 0.5 - 1.4 mg/dL Final     Calcium   Date Value Ref Range Status   08/31/2017 10.4 8.7 - 10.5 mg/dL Final     Total Protein   Date Value Ref Range Status   04/11/2009 7.2 6.0 - 8.4 gm/dl Final "     Albumin   Date Value Ref Range Status   2009 4.8 3.5 - 5.2 g/dl Final     Total Bilirubin   Date Value Ref Range Status   2009 0.5 0.1 - 1.0 mg/dl Final     Comment:     For infants and newborns, interpretation of results should be based  on gestational age, weight and in agreement with clinical  observations.  .  Premature Infant recommended reference ranges:  Up to 24 hours.............<8.0 mg/dl  Up to 48 hours............<12.0 mg/dl  3-5 days..................<15.0 mg/dl  6-29 days.................<15.0 mg/dl       Alkaline Phosphatase   Date Value Ref Range Status   2009 73 55 - 135 U/L Final     AST   Date Value Ref Range Status   2009 20 10 - 40 U/L Final     ALT   Date Value Ref Range Status   2009 22 10 - 44 U/L Final     Anion Gap   Date Value Ref Range Status   2017 7 (L) 8 - 16 mmol/L Final     eGFR if    Date Value Ref Range Status   2017 >60.0 >60 mL/min/1.73 m^2 Final     eGFR if non    Date Value Ref Range Status   2017 >60.0 >60 mL/min/1.73 m^2 Final     Comment:     Calculation used to obtain the estimated glomerular filtration  rate (eGFR) is the CKD-EPI equation. Since race is unknown   in our information system, the eGFR values for   -American and Non--American patients are given   for each creatinine result.         INR:  No results for input(s): INR, PROTIME, APTT in the last 72 hours.    Invalid input(s): PT    Diagnostic Studies: No relevant studies.    EK/31/17:   Normal sinus rhythm  Normal ECG  No previous ECGs available  Confirmed by Courtney HORN, Hardy Clancy ([id number]) on 2017 9:59:22 AM    2D ECHO:  No results found for this or any previous visit.      ASSESSMENT/PLAN:       Anesthesia Evaluation    I have reviewed the Patient Summary Reports.    I have reviewed the Nursing Notes.   I have reviewed the Medications.     Review of Systems  Anesthesia Hx:  No problems with  previous Anesthesia    Social:  Non-Smoker    Cardiovascular:   Hypertension Denies CAD.    Denies CABG/stent.   Denies Angina.    Pulmonary:   Sleep Apnea, CPAP    Musculoskeletal:   Arthritis   Spine Disorders: Degenerative disease and Disc disease    Endocrine:   Diabetes, type 2, using insulin    Psych:   anxiety adhd         Physical Exam  General:  Obesity    Airway/Jaw/Neck:  Airway Findings: Mouth Opening: Normal Tongue: Normal  Mallampati: II  TM Distance: Normal, at least 6 cm  Jaw/Neck Findings:  Neck ROM: Extension Decreased, Mild       Chest/Lungs:  Chest/Lungs Findings: Clear to auscultation, Normal Respiratory Rate     Heart/Vascular:  Heart Findings: Rate: Normal  Rhythm: Regular Rhythm        Mental Status:  Mental Status Findings:  Cooperative, Alert and Oriented         Anesthesia Plan  Type of Anesthesia, risks & benefits discussed:  Anesthesia Type:  general  Patient's Preference:   Intra-op Monitoring Plan: arterial line and standard ASA monitors  Intra-op Monitoring Plan Comments:   Post Op Pain Control Plan: multimodal analgesia and per primary service following discharge from PACU  Post Op Pain Control Plan Comments:   Induction:   IV  Beta Blocker:  Patient is not currently on a Beta-Blocker (No further documentation required).       Informed Consent: Patient understands risks and agrees with Anesthesia plan.  Questions answered. Anesthesia consent signed with patient.  ASA Score: 3     Day of Surgery Review of History & Physical: I have interviewed and examined the patient. I have reviewed the patient's H&P dated:            Ready For Surgery From Anesthesia Perspective.

## 2017-09-08 ENCOUNTER — HOSPITAL ENCOUNTER (INPATIENT)
Facility: HOSPITAL | Age: 57
LOS: 1 days | Discharge: HOME | DRG: 708 | End: 2017-09-09
Attending: UROLOGY | Admitting: UROLOGY
Payer: COMMERCIAL

## 2017-09-08 ENCOUNTER — ANESTHESIA (OUTPATIENT)
Dept: SURGERY | Facility: HOSPITAL | Age: 57
DRG: 708 | End: 2017-09-08
Payer: COMMERCIAL

## 2017-09-08 ENCOUNTER — SURGERY (OUTPATIENT)
Age: 57
End: 2017-09-08

## 2017-09-08 DIAGNOSIS — C61 PROSTATE CANCER: Primary | ICD-10-CM

## 2017-09-08 LAB
ANION GAP SERPL CALC-SCNC: 8 MMOL/L
BASOPHILS # BLD AUTO: 0.01 K/UL
BASOPHILS NFR BLD: 0.1 %
BUN SERPL-MCNC: 24 MG/DL
CALCIUM SERPL-MCNC: 7.7 MG/DL
CHLORIDE SERPL-SCNC: 107 MMOL/L
CO2 SERPL-SCNC: 23 MMOL/L
CREAT SERPL-MCNC: 0.8 MG/DL
DIFFERENTIAL METHOD: ABNORMAL
EOSINOPHIL # BLD AUTO: 0 K/UL
EOSINOPHIL NFR BLD: 0 %
ERYTHROCYTE [DISTWIDTH] IN BLOOD BY AUTOMATED COUNT: 14.4 %
EST. GFR  (AFRICAN AMERICAN): >60 ML/MIN/1.73 M^2
EST. GFR  (NON AFRICAN AMERICAN): >60 ML/MIN/1.73 M^2
GLUCOSE SERPL-MCNC: 171 MG/DL (ref 70–110)
GLUCOSE SERPL-MCNC: 177 MG/DL
HCO3 UR-SCNC: 25.3 MMOL/L (ref 24–28)
HCT VFR BLD AUTO: 36.1 %
HCT VFR BLD CALC: 40 %PCV (ref 36–54)
HGB BLD-MCNC: 12.6 G/DL
LYMPHOCYTES # BLD AUTO: 0.7 K/UL
LYMPHOCYTES NFR BLD: 6.9 %
MCH RBC QN AUTO: 27.8 PG
MCHC RBC AUTO-ENTMCNC: 34.9 G/DL
MCV RBC AUTO: 80 FL
MONOCYTES # BLD AUTO: 0.3 K/UL
MONOCYTES NFR BLD: 2.8 %
NEUTROPHILS # BLD AUTO: 9.6 K/UL
NEUTROPHILS NFR BLD: 89.9 %
PCO2 BLDA: 50.1 MMHG (ref 35–45)
PH SMN: 7.31 [PH] (ref 7.35–7.45)
PLATELET # BLD AUTO: 156 K/UL
PMV BLD AUTO: 9 FL
PO2 BLDA: 163 MMHG (ref 80–100)
POC BE: -1 MMOL/L
POC IONIZED CALCIUM: 1.16 MMOL/L (ref 1.06–1.42)
POC SATURATED O2: 99 % (ref 95–100)
POC TCO2: 27 MMOL/L (ref 23–27)
POCT GLUCOSE: 111 MG/DL (ref 70–110)
POCT GLUCOSE: 159 MG/DL (ref 70–110)
POCT GLUCOSE: 178 MG/DL (ref 70–110)
POCT GLUCOSE: 202 MG/DL (ref 70–110)
POTASSIUM BLD-SCNC: 4 MMOL/L (ref 3.5–5.1)
POTASSIUM SERPL-SCNC: 4.1 MMOL/L
RBC # BLD AUTO: 4.53 M/UL
SAMPLE: ABNORMAL
SODIUM BLD-SCNC: 139 MMOL/L (ref 136–145)
SODIUM SERPL-SCNC: 138 MMOL/L
WBC # BLD AUTO: 10.7 K/UL

## 2017-09-08 PROCEDURE — 25000003 PHARM REV CODE 250

## 2017-09-08 PROCEDURE — 85025 COMPLETE CBC W/AUTO DIFF WBC: CPT

## 2017-09-08 PROCEDURE — 0TBC4ZX EXCISION OF BLADDER NECK, PERCUTANEOUS ENDOSCOPIC APPROACH, DIAGNOSTIC: ICD-10-PCS | Performed by: UROLOGY

## 2017-09-08 PROCEDURE — 63600175 PHARM REV CODE 636 W HCPCS: Performed by: STUDENT IN AN ORGANIZED HEALTH CARE EDUCATION/TRAINING PROGRAM

## 2017-09-08 PROCEDURE — 94799 UNLISTED PULMONARY SVC/PX: CPT

## 2017-09-08 PROCEDURE — 27201037 HC PRESSURE MONITORING SET UP

## 2017-09-08 PROCEDURE — A4216 STERILE WATER/SALINE, 10 ML: HCPCS | Performed by: STUDENT IN AN ORGANIZED HEALTH CARE EDUCATION/TRAINING PROGRAM

## 2017-09-08 PROCEDURE — 25000003 PHARM REV CODE 250: Performed by: STUDENT IN AN ORGANIZED HEALTH CARE EDUCATION/TRAINING PROGRAM

## 2017-09-08 PROCEDURE — 55866 LAPS SURG PRST8ECT RPBIC RAD: CPT | Mod: AS,,, | Performed by: PHYSICIAN ASSISTANT

## 2017-09-08 PROCEDURE — D9220A PRA ANESTHESIA: Mod: ,,, | Performed by: ANESTHESIOLOGY

## 2017-09-08 PROCEDURE — 36620 INSERTION CATHETER ARTERY: CPT | Mod: 59,,, | Performed by: ANESTHESIOLOGY

## 2017-09-08 PROCEDURE — 36000713 HC OR TIME LEV V EA ADD 15 MIN: Performed by: UROLOGY

## 2017-09-08 PROCEDURE — 88307 TISSUE EXAM BY PATHOLOGIST: CPT | Mod: 26,,,

## 2017-09-08 PROCEDURE — 71000039 HC RECOVERY, EACH ADD'L HOUR: Performed by: UROLOGY

## 2017-09-08 PROCEDURE — 11000001 HC ACUTE MED/SURG PRIVATE ROOM

## 2017-09-08 PROCEDURE — 94760 N-INVAS EAR/PLS OXIMETRY 1: CPT

## 2017-09-08 PROCEDURE — 38571 LAPAROSCOPY LYMPHADENECTOMY: CPT | Mod: 51,,, | Performed by: UROLOGY

## 2017-09-08 PROCEDURE — 36000712 HC OR TIME LEV V 1ST 15 MIN: Performed by: UROLOGY

## 2017-09-08 PROCEDURE — 27100025 HC TUBING, SET FLUID WARMER: Performed by: STUDENT IN AN ORGANIZED HEALTH CARE EDUCATION/TRAINING PROGRAM

## 2017-09-08 PROCEDURE — 25000003 PHARM REV CODE 250: Performed by: UROLOGY

## 2017-09-08 PROCEDURE — 0VT04ZZ RESECTION OF PROSTATE, PERCUTANEOUS ENDOSCOPIC APPROACH: ICD-10-PCS | Performed by: UROLOGY

## 2017-09-08 PROCEDURE — 27200677 HC TRANSDUCER MONITOR KIT SINGLE: Performed by: STUDENT IN AN ORGANIZED HEALTH CARE EDUCATION/TRAINING PROGRAM

## 2017-09-08 PROCEDURE — C9399 UNCLASSIFIED DRUGS OR BIOLOG: HCPCS | Performed by: STUDENT IN AN ORGANIZED HEALTH CARE EDUCATION/TRAINING PROGRAM

## 2017-09-08 PROCEDURE — C1751 CATH, INF, PER/CENT/MIDLINE: HCPCS | Performed by: STUDENT IN AN ORGANIZED HEALTH CARE EDUCATION/TRAINING PROGRAM

## 2017-09-08 PROCEDURE — 27000221 HC OXYGEN, UP TO 24 HOURS

## 2017-09-08 PROCEDURE — 88305 TISSUE EXAM BY PATHOLOGIST: CPT

## 2017-09-08 PROCEDURE — 38571 LAPAROSCOPY LYMPHADENECTOMY: CPT | Mod: AS,51,, | Performed by: PHYSICIAN ASSISTANT

## 2017-09-08 PROCEDURE — 71000033 HC RECOVERY, INTIAL HOUR: Performed by: UROLOGY

## 2017-09-08 PROCEDURE — 07BC4ZX EXCISION OF PELVIS LYMPHATIC, PERCUTANEOUS ENDOSCOPIC APPROACH, DIAGNOSTIC: ICD-10-PCS | Performed by: UROLOGY

## 2017-09-08 PROCEDURE — 8E0W4CZ ROBOTIC ASSISTED PROCEDURE OF TRUNK REGION, PERCUTANEOUS ENDOSCOPIC APPROACH: ICD-10-PCS | Performed by: UROLOGY

## 2017-09-08 PROCEDURE — 88305 TISSUE EXAM BY PATHOLOGIST: CPT | Mod: 26,,,

## 2017-09-08 PROCEDURE — 82962 GLUCOSE BLOOD TEST: CPT | Performed by: UROLOGY

## 2017-09-08 PROCEDURE — 37000009 HC ANESTHESIA EA ADD 15 MINS: Performed by: UROLOGY

## 2017-09-08 PROCEDURE — 63600175 PHARM REV CODE 636 W HCPCS: Performed by: UROLOGY

## 2017-09-08 PROCEDURE — 80048 BASIC METABOLIC PNL TOTAL CA: CPT

## 2017-09-08 PROCEDURE — 27201423 OPTIME MED/SURG SUP & DEVICES STERILE SUPPLY: Performed by: UROLOGY

## 2017-09-08 PROCEDURE — 63600175 PHARM REV CODE 636 W HCPCS

## 2017-09-08 PROCEDURE — C1729 CATH, DRAINAGE: HCPCS | Performed by: UROLOGY

## 2017-09-08 PROCEDURE — 55866 LAPS SURG PRST8ECT RPBIC RAD: CPT | Mod: ,,, | Performed by: UROLOGY

## 2017-09-08 PROCEDURE — 88309 TISSUE EXAM BY PATHOLOGIST: CPT | Mod: 26,,,

## 2017-09-08 PROCEDURE — 37000008 HC ANESTHESIA 1ST 15 MINUTES: Performed by: UROLOGY

## 2017-09-08 PROCEDURE — 63600175 PHARM REV CODE 636 W HCPCS: Performed by: ANESTHESIOLOGY

## 2017-09-08 RX ORDER — PROPOFOL 10 MG/ML
VIAL (ML) INTRAVENOUS
Status: DISCONTINUED | OUTPATIENT
Start: 2017-09-08 | End: 2017-09-08

## 2017-09-08 RX ORDER — MIDAZOLAM HYDROCHLORIDE 1 MG/ML
INJECTION, SOLUTION INTRAMUSCULAR; INTRAVENOUS
Status: DISCONTINUED | OUTPATIENT
Start: 2017-09-08 | End: 2017-09-08

## 2017-09-08 RX ORDER — GLYCOPYRROLATE 0.2 MG/ML
INJECTION INTRAMUSCULAR; INTRAVENOUS
Status: DISCONTINUED | OUTPATIENT
Start: 2017-09-08 | End: 2017-09-08

## 2017-09-08 RX ORDER — LIDOCAINE HYDROCHLORIDE ANHYDROUS AND DEXTROSE MONOHYDRATE .8; 5 G/100ML; G/100ML
INJECTION, SOLUTION INTRAVENOUS CONTINUOUS PRN
Status: DISCONTINUED | OUTPATIENT
Start: 2017-09-08 | End: 2017-09-08

## 2017-09-08 RX ORDER — HEPARIN SODIUM 5000 [USP'U]/ML
5000 INJECTION, SOLUTION INTRAVENOUS; SUBCUTANEOUS EVERY 8 HOURS
Status: DISCONTINUED | OUTPATIENT
Start: 2017-09-09 | End: 2017-09-09 | Stop reason: HOSPADM

## 2017-09-08 RX ORDER — OXYCODONE AND ACETAMINOPHEN 5; 325 MG/1; MG/1
1 TABLET ORAL EVERY 4 HOURS PRN
Qty: 41 TABLET | Refills: 0 | Status: ON HOLD | OUTPATIENT
Start: 2017-09-08 | End: 2017-09-17 | Stop reason: HOSPADM

## 2017-09-08 RX ORDER — SULFAMETHOXAZOLE AND TRIMETHOPRIM 800; 160 MG/1; MG/1
1 TABLET ORAL 2 TIMES DAILY
Qty: 14 TABLET | Refills: 0 | Status: ON HOLD | OUTPATIENT
Start: 2017-09-08 | End: 2017-09-17 | Stop reason: HOSPADM

## 2017-09-08 RX ORDER — SODIUM CHLORIDE 0.9 % (FLUSH) 0.9 %
3 SYRINGE (ML) INJECTION
Status: DISCONTINUED | OUTPATIENT
Start: 2017-09-08 | End: 2017-09-08

## 2017-09-08 RX ORDER — NEOSTIGMINE METHYLSULFATE 1 MG/ML
INJECTION, SOLUTION INTRAVENOUS
Status: DISCONTINUED | OUTPATIENT
Start: 2017-09-08 | End: 2017-09-08

## 2017-09-08 RX ORDER — KETAMINE HYDROCHLORIDE 100 MG/ML
INJECTION, SOLUTION INTRAMUSCULAR; INTRAVENOUS
Status: DISCONTINUED | OUTPATIENT
Start: 2017-09-08 | End: 2017-09-08

## 2017-09-08 RX ORDER — LIDOCAINE HYDROCHLORIDE 10 MG/ML
1 INJECTION, SOLUTION EPIDURAL; INFILTRATION; INTRACAUDAL; PERINEURAL ONCE
Status: COMPLETED | OUTPATIENT
Start: 2017-09-08 | End: 2017-09-08

## 2017-09-08 RX ORDER — METOCLOPRAMIDE HYDROCHLORIDE 5 MG/ML
INJECTION INTRAMUSCULAR; INTRAVENOUS
Status: DISCONTINUED | OUTPATIENT
Start: 2017-09-08 | End: 2017-09-08

## 2017-09-08 RX ORDER — METOCLOPRAMIDE HYDROCHLORIDE 5 MG/ML
10 INJECTION INTRAMUSCULAR; INTRAVENOUS EVERY 6 HOURS PRN
Status: DISCONTINUED | OUTPATIENT
Start: 2017-09-08 | End: 2017-09-09 | Stop reason: HOSPADM

## 2017-09-08 RX ORDER — OXYCODONE HYDROCHLORIDE 5 MG/1
TABLET ORAL
Status: COMPLETED
Start: 2017-09-08 | End: 2017-09-08

## 2017-09-08 RX ORDER — HYDROMORPHONE HYDROCHLORIDE 1 MG/ML
0.2 INJECTION, SOLUTION INTRAMUSCULAR; INTRAVENOUS; SUBCUTANEOUS EVERY 5 MIN PRN
Status: DISCONTINUED | OUTPATIENT
Start: 2017-09-08 | End: 2017-09-08

## 2017-09-08 RX ORDER — OXYCODONE HYDROCHLORIDE 5 MG/1
5 TABLET ORAL EVERY 4 HOURS PRN
Status: DISCONTINUED | OUTPATIENT
Start: 2017-09-08 | End: 2017-09-09 | Stop reason: HOSPADM

## 2017-09-08 RX ORDER — VALSARTAN 160 MG/1
160 TABLET ORAL DAILY
Status: DISCONTINUED | OUTPATIENT
Start: 2017-09-08 | End: 2017-09-09 | Stop reason: HOSPADM

## 2017-09-08 RX ORDER — OXYCODONE HYDROCHLORIDE 5 MG/1
10 TABLET ORAL EVERY 4 HOURS PRN
Status: DISCONTINUED | OUTPATIENT
Start: 2017-09-08 | End: 2017-09-09 | Stop reason: HOSPADM

## 2017-09-08 RX ORDER — ACETAMINOPHEN 10 MG/ML
INJECTION, SOLUTION INTRAVENOUS
Status: COMPLETED
Start: 2017-09-08 | End: 2017-09-08

## 2017-09-08 RX ORDER — METOCLOPRAMIDE HYDROCHLORIDE 5 MG/ML
10 INJECTION INTRAMUSCULAR; INTRAVENOUS EVERY 10 MIN PRN
Status: DISCONTINUED | OUTPATIENT
Start: 2017-09-08 | End: 2017-09-08

## 2017-09-08 RX ORDER — POLYETHYLENE GLYCOL 3350 17 G/17G
POWDER, FOR SOLUTION ORAL
Status: COMPLETED
Start: 2017-09-08 | End: 2017-09-08

## 2017-09-08 RX ORDER — LORAZEPAM 0.5 MG/1
0.5 TABLET ORAL EVERY 6 HOURS PRN
Status: DISCONTINUED | OUTPATIENT
Start: 2017-09-08 | End: 2017-09-09 | Stop reason: HOSPADM

## 2017-09-08 RX ORDER — DEXMEDETOMIDINE HYDROCHLORIDE 4 UG/ML
INJECTION, SOLUTION INTRAVENOUS
Status: DISPENSED
Start: 2017-09-08 | End: 2017-09-08

## 2017-09-08 RX ORDER — HEPARIN SODIUM 5000 [USP'U]/ML
5000 INJECTION, SOLUTION INTRAVENOUS; SUBCUTANEOUS EVERY 8 HOURS
Status: DISCONTINUED | OUTPATIENT
Start: 2017-09-08 | End: 2017-09-08

## 2017-09-08 RX ORDER — LIDOCAINE HCL/PF 100 MG/5ML
SYRINGE (ML) INTRAVENOUS
Status: DISCONTINUED | OUTPATIENT
Start: 2017-09-08 | End: 2017-09-08

## 2017-09-08 RX ORDER — ACETAMINOPHEN 10 MG/ML
INJECTION, SOLUTION INTRAVENOUS
Status: DISCONTINUED | OUTPATIENT
Start: 2017-09-08 | End: 2017-09-08

## 2017-09-08 RX ORDER — POLYETHYLENE GLYCOL 3350 17 G/17G
17 POWDER, FOR SOLUTION ORAL DAILY
Qty: 30 PACKET | Refills: 0 | Status: SHIPPED | OUTPATIENT
Start: 2017-09-08 | End: 2017-10-05

## 2017-09-08 RX ORDER — BUPROPION HYDROCHLORIDE 150 MG/1
300 TABLET ORAL DAILY
Status: DISCONTINUED | OUTPATIENT
Start: 2017-09-08 | End: 2017-09-09 | Stop reason: HOSPADM

## 2017-09-08 RX ORDER — KETOROLAC TROMETHAMINE 15 MG/ML
INJECTION, SOLUTION INTRAMUSCULAR; INTRAVENOUS
Status: DISCONTINUED | OUTPATIENT
Start: 2017-09-08 | End: 2017-09-08

## 2017-09-08 RX ORDER — ROCURONIUM BROMIDE 10 MG/ML
INJECTION, SOLUTION INTRAVENOUS
Status: DISCONTINUED | OUTPATIENT
Start: 2017-09-08 | End: 2017-09-08

## 2017-09-08 RX ORDER — SODIUM CHLORIDE 9 MG/ML
INJECTION, SOLUTION INTRAVENOUS CONTINUOUS
Status: DISCONTINUED | OUTPATIENT
Start: 2017-09-08 | End: 2017-09-08

## 2017-09-08 RX ORDER — SODIUM CHLORIDE 9 MG/ML
INJECTION, SOLUTION INTRAVENOUS CONTINUOUS
Status: DISCONTINUED | OUTPATIENT
Start: 2017-09-08 | End: 2017-09-09 | Stop reason: HOSPADM

## 2017-09-08 RX ORDER — ACETAMINOPHEN 10 MG/ML
1000 INJECTION, SOLUTION INTRAVENOUS EVERY 8 HOURS
Status: COMPLETED | OUTPATIENT
Start: 2017-09-08 | End: 2017-09-09

## 2017-09-08 RX ORDER — PHENYLEPHRINE HYDROCHLORIDE 10 MG/ML
INJECTION INTRAVENOUS
Status: DISCONTINUED | OUTPATIENT
Start: 2017-09-08 | End: 2017-09-08

## 2017-09-08 RX ORDER — POLYETHYLENE GLYCOL 3350 17 G/17G
17 POWDER, FOR SOLUTION ORAL DAILY
Status: DISCONTINUED | OUTPATIENT
Start: 2017-09-08 | End: 2017-09-09 | Stop reason: HOSPADM

## 2017-09-08 RX ORDER — DEXAMETHASONE SODIUM PHOSPHATE 4 MG/ML
INJECTION, SOLUTION INTRA-ARTICULAR; INTRALESIONAL; INTRAMUSCULAR; INTRAVENOUS; SOFT TISSUE
Status: DISCONTINUED | OUTPATIENT
Start: 2017-09-08 | End: 2017-09-08

## 2017-09-08 RX ADMIN — DEXMEDETOMIDINE HYDROCHLORIDE 0.5 MCG/KG/HR: 100 INJECTION, SOLUTION, CONCENTRATE INTRAVENOUS at 07:09

## 2017-09-08 RX ADMIN — SODIUM CHLORIDE, SODIUM GLUCONATE, SODIUM ACETATE, POTASSIUM CHLORIDE, MAGNESIUM CHLORIDE, SODIUM PHOSPHATE, DIBASIC, AND POTASSIUM PHOSPHATE: .53; .5; .37; .037; .03; .012; .00082 INJECTION, SOLUTION INTRAVENOUS at 09:09

## 2017-09-08 RX ADMIN — BUPROPION HYDROCHLORIDE 300 MG: 300 TABLET, EXTENDED RELEASE ORAL at 04:09

## 2017-09-08 RX ADMIN — GLYCOPYRROLATE 0.6 MG: 0.2 INJECTION, SOLUTION INTRAMUSCULAR; INTRAVENOUS at 11:09

## 2017-09-08 RX ADMIN — OXYCODONE HYDROCHLORIDE 10 MG: 5 TABLET ORAL at 01:09

## 2017-09-08 RX ADMIN — SODIUM CHLORIDE: 0.9 INJECTION, SOLUTION INTRAVENOUS at 06:09

## 2017-09-08 RX ADMIN — PHENYLEPHRINE HYDROCHLORIDE 100 MCG: 10 INJECTION INTRAVENOUS at 08:09

## 2017-09-08 RX ADMIN — HYDROMORPHONE HYDROCHLORIDE 0.2 MG: 1 INJECTION, SOLUTION INTRAMUSCULAR; INTRAVENOUS; SUBCUTANEOUS at 12:09

## 2017-09-08 RX ADMIN — NEOSTIGMINE METHYLSULFATE 5 MG: 1 INJECTION INTRAVENOUS at 11:09

## 2017-09-08 RX ADMIN — SODIUM CHLORIDE, SODIUM GLUCONATE, SODIUM ACETATE, POTASSIUM CHLORIDE, MAGNESIUM CHLORIDE, SODIUM PHOSPHATE, DIBASIC, AND POTASSIUM PHOSPHATE: .53; .5; .37; .037; .03; .012; .00082 INJECTION, SOLUTION INTRAVENOUS at 10:09

## 2017-09-08 RX ADMIN — ROCURONIUM BROMIDE 10 MG: 10 INJECTION, SOLUTION INTRAVENOUS at 09:09

## 2017-09-08 RX ADMIN — PROPOFOL 200 MG: 10 INJECTION, EMULSION INTRAVENOUS at 07:09

## 2017-09-08 RX ADMIN — PHENYLEPHRINE HYDROCHLORIDE 200 MCG: 10 INJECTION INTRAVENOUS at 07:09

## 2017-09-08 RX ADMIN — ACETAMINOPHEN 1000 MG: 10 INJECTION, SOLUTION INTRAVENOUS at 07:09

## 2017-09-08 RX ADMIN — ACETAMINOPHEN 1000 MG: 10 INJECTION, SOLUTION INTRAVENOUS at 04:09

## 2017-09-08 RX ADMIN — LIDOCAINE HYDROCHLORIDE 80 MG: 20 INJECTION, SOLUTION INTRAVENOUS at 07:09

## 2017-09-08 RX ADMIN — ROCURONIUM BROMIDE 20 MG: 10 INJECTION, SOLUTION INTRAVENOUS at 10:09

## 2017-09-08 RX ADMIN — KETOROLAC TROMETHAMINE 30 MG: 15 INJECTION, SOLUTION INTRAMUSCULAR; INTRAVENOUS at 07:09

## 2017-09-08 RX ADMIN — MIDAZOLAM HYDROCHLORIDE 2 MG: 1 INJECTION, SOLUTION INTRAMUSCULAR; INTRAVENOUS at 07:09

## 2017-09-08 RX ADMIN — Medication 2 G: at 07:09

## 2017-09-08 RX ADMIN — METOCLOPRAMIDE 10 MG: 5 INJECTION, SOLUTION INTRAMUSCULAR; INTRAVENOUS at 07:09

## 2017-09-08 RX ADMIN — VALSARTAN 160 MG: 160 TABLET, FILM COATED ORAL at 04:09

## 2017-09-08 RX ADMIN — PHENYLEPHRINE HYDROCHLORIDE 0.25 MCG/KG/MIN: 10 INJECTION INTRAVENOUS at 08:09

## 2017-09-08 RX ADMIN — ACETAMINOPHEN 1000 MG: 10 INJECTION, SOLUTION INTRAVENOUS at 11:09

## 2017-09-08 RX ADMIN — DEXAMETHASONE SODIUM PHOSPHATE 8 MG: 4 INJECTION, SOLUTION INTRAMUSCULAR; INTRAVENOUS at 07:09

## 2017-09-08 RX ADMIN — ROCURONIUM BROMIDE 40 MG: 10 INJECTION, SOLUTION INTRAVENOUS at 09:09

## 2017-09-08 RX ADMIN — ROCURONIUM BROMIDE 50 MG: 10 INJECTION, SOLUTION INTRAVENOUS at 07:09

## 2017-09-08 RX ADMIN — SODIUM CHLORIDE, SODIUM GLUCONATE, SODIUM ACETATE, POTASSIUM CHLORIDE, MAGNESIUM CHLORIDE, SODIUM PHOSPHATE, DIBASIC, AND POTASSIUM PHOSPHATE: .53; .5; .37; .037; .03; .012; .00082 INJECTION, SOLUTION INTRAVENOUS at 08:09

## 2017-09-08 RX ADMIN — KETAMINE HYDROCHLORIDE 30 MG: 100 INJECTION, SOLUTION, CONCENTRATE INTRAMUSCULAR; INTRAVENOUS at 07:09

## 2017-09-08 RX ADMIN — HEPARIN SODIUM 5000 UNITS: 5000 INJECTION, SOLUTION INTRAVENOUS; SUBCUTANEOUS at 06:09

## 2017-09-08 RX ADMIN — SUGAMMADEX 200 MG: 100 INJECTION, SOLUTION INTRAVENOUS at 11:09

## 2017-09-08 RX ADMIN — SODIUM CHLORIDE 1000 ML: 0.9 INJECTION, SOLUTION INTRAVENOUS at 04:09

## 2017-09-08 RX ADMIN — SODIUM CHLORIDE: 0.9 INJECTION, SOLUTION INTRAVENOUS at 12:09

## 2017-09-08 RX ADMIN — LIDOCAINE HYDROCHLORIDE 0.03 MG/KG/MIN: 8 INJECTION, SOLUTION INTRAVENOUS at 07:09

## 2017-09-08 RX ADMIN — POLYETHYLENE GLYCOL 3350 17 G: 17 POWDER, FOR SOLUTION ORAL at 01:09

## 2017-09-08 RX ADMIN — ROCURONIUM BROMIDE 30 MG: 10 INJECTION, SOLUTION INTRAVENOUS at 07:09

## 2017-09-08 RX ADMIN — ROCURONIUM BROMIDE 20 MG: 10 INJECTION, SOLUTION INTRAVENOUS at 11:09

## 2017-09-08 RX ADMIN — HYDROMORPHONE HYDROCHLORIDE 0.2 MG: 1 INJECTION, SOLUTION INTRAMUSCULAR; INTRAVENOUS; SUBCUTANEOUS at 01:09

## 2017-09-08 RX ADMIN — OXYCODONE HYDROCHLORIDE 10 MG: 5 TABLET ORAL at 08:09

## 2017-09-08 RX ADMIN — PHENYLEPHRINE HYDROCHLORIDE 100 MCG: 10 INJECTION INTRAVENOUS at 11:09

## 2017-09-08 RX ADMIN — LIDOCAINE HYDROCHLORIDE 0.2 MG: 10 INJECTION, SOLUTION EPIDURAL; INFILTRATION; INTRACAUDAL; PERINEURAL at 06:09

## 2017-09-08 RX ADMIN — ROCURONIUM BROMIDE 20 MG: 10 INJECTION, SOLUTION INTRAVENOUS at 09:09

## 2017-09-08 RX ADMIN — PROPOFOL 50 MG: 10 INJECTION, EMULSION INTRAVENOUS at 07:09

## 2017-09-08 NOTE — ANESTHESIA PROCEDURE NOTES
Arterial    Diagnosis: prostatectomy     Patient location during procedure: done in OR  Procedure start time: 9/8/2017 7:25 AM  Timeout: 9/8/2017 7:59 AM  Procedure end time: 9/8/2017 7:32 AM  Staffing  Anesthesiologist: KIMBERLY RUTH  Resident/CRNA: JOSE WATKINS  Performed: resident/CRNA   Anesthesiologist was present at the time of the procedure.  Preanesthetic Checklist  Completed: patient identified, site marked, surgical consent, pre-op evaluation, timeout performed, IV checked, risks and benefits discussed, monitors and equipment checked and anesthesia consent givenArterial  Skin Prep: chlorhexidine gluconate  Local Infiltration: none  Orientation: left  Location: radial  Catheter Size: 20 G  Catheter placement by Anatomical landmarks. Heme positive aspiration all ports.Insertion Attempts: 3  Assessment  Dressing: secured with tape and tegaderm

## 2017-09-08 NOTE — OP NOTE
Certification of Assistant at Surgery       Surgery Date: 9/8/2017     Participating Surgeons:  Surgeon(s) and Role:     * Aayush Cervantes MD - Resident - Assisting     * Tariq Burns MD - Primary    Procedures:  Procedure(s) (LRB):  ROBOTIC ASSISTED LAPAROSCOPIC PROSTATECTOMY (N/A)    Assistant Surgeon's Certification of Necessity:  I understand that section 1842 (b) (6) (d) of the Social Security Act generally prohibits Medicare Part B reasonable charge payment for the services of assistants at surgery in teaching hospitals when qualified residents are available to furnish such services. I certify that the services for which payment is claimed were medically necessary, and that no qualified resident was available to perform the services. I further understand that these services are subject to post-payment review by the Medicare carrier.      Aurelia Cosby PA-C    09/08/2017  12:46 PM

## 2017-09-08 NOTE — PROGRESS NOTES
2nd attempt to call report.  Told Halle was busy but another nurse would take report.  Call transferred but no one answered.  Called [phone] severa times but phone just rings or is automatically transferred to another line that just hangs up.  Will reattempt calling again in a few minutes.

## 2017-09-08 NOTE — PROGRESS NOTES
Attempted to call report to Halle (5th flr admit rn [phone]) but RN admitting another patient.  Spectralink number taken and will call back once finished admitting patient.

## 2017-09-08 NOTE — NURSING TRANSFER
Nursing Transfer Note      9/8/2017     Transfer To: 523a    Transfer via stretcher    Transfer with ivf    Transported by pct    Medicines sent: ivf    Chart send with patient: Yes    Notified: spouse given the room number    Patient reassessed at: 9/8/17

## 2017-09-08 NOTE — OP NOTE
9/8/2017      Procedure:   1) laparoscopic radical prostatectomy with robotic assistance  2) laparoscopic bilateral pelvic lymph node dissection    Preop diagnosis: Prostate Cancer  Postop diagnosis: Prostate Cancer, Stage T1C    Surgeon: Primitivo Bruns MD (present for the entire procedure)  Assistant: Aurelia TORO. A qualified resident was not available to first assist.  EBL: 175 ccs    Wound Class: 2    Specimens removed: prostate, seminal vesicles, lymph nodes, bladder neck biopsy    Drain: Mcdaniels      PROCEDURE NOTE:  Preoperatively the H&P were updated. Also confirmed that patient had had their hibiclens shower and preop hydration. After general endotracheal anesthesia, the patient was carefully positioned, padded, prepped and draped.  Positioning and padding was checked by the surgeon and the circulating nurse.  IV antibiotics were administered within 1 hour prior to making initial skin incision.  An OG tube was placed.  A Mcdaniels catheter was placed.  A timeout was called. The patient's identity was confirmed with 2 identifiers.  The correct procedure, allergies, blood products were verified by the entire operative team.                                                                      A Veress needle was placed at the supraumbilical position and the abdomen insufflated to 15 mmHg.  A 12 mm trocar was placed at this site and a 0 degree camera was introduced. The abdominal cavity was carefully inspected. There was no evidence of trauma to the peritoneal contents, nor any evidence of injury to the retroperitoneum.  All accessory trocars were then placed under direct vision.                                                                             The peritoneum posterior to the bladder was incised, the right vas deferens                     identified and divided.  The right seminal vesicle was gently dissected away from surrounding tissue with care being taken not to cause stretch  or thermal injury  to the lateral pedicle. A similar procedure was performed  on  the left side.  Both seminal vesicles were retracted anteriorly. Denonvilliers fascia was incised and this plane of dissection carried down to the level of the apex of the prostate.  A posterior/lateral release was performed bilaterally.                                                                                                                                            An anterior bladder drop was performed.  After dropping the bladder, a right sided pelvic lymph node dissection was completed and this was sent as permanent pathologic specimen #1.  The endopelvic fascia on the right  was gently dissected posteriorly, thus beginning the lateral release.  A left sided pelvic lymph node dissection was performed and this was sent  as permanent pathologic specimen #2.  Again, the endopelvic fascia was   gently dissected posteriorly, completing the lateral release.  A V stitch was used in a figure of eight fashion to control the dorsal venous complex. Excellent hemostasis was noted at this juncture. The plane between the bladder neck and prostate base was developed and the urethra was divided, a bladder neck sparing approach was utilized.  Excellent preservation of the internal sphincter was achieved circumferentially.     The bladder neck biopsy was sent as permanent section specimen #3.      After dividing the posterior bladder neck, the seminal vesicles and vas ampulla were revisualized and retracted anteriorly. The lateral pedicle on the right was controlled with bipolar cautery.      Cold scissors were used to athermically dissect the neurovascular bundle away  from the capsule of the prostate down to the level of the urethra, thus preserving the right neurovascular bundle.      A similar procedure was performed on the left side.    The urethra at the apex was divided preserving maximal urethral length.The rectourethralis was divided. The specimen was  placed in the right colic gutter. The pelvis was  irrigated and carefully inspected.  There was no evidence of rectal trauma and there was excellent hemostasis.  A vesicourethral anastomosis was then performed with a running suture of 3-0 Monocryl.  After completing the anastomosis, a fresh Mcdaniels catheter was advanced into the bladder and the balloon  inflated.  The bladder was irrigated, there was noted to be no extravasation at the anastomosis. The specimen was placed in an endocatch bag.     Throughout the procedure the first assistant assisted with positioning, trocar placement, docking. She also provided assistance with exposure, visualization, traction/countertraction, suction and irrigation.     A drain .was placed, specimen was removed from the field and port sites were closed.  Needle and sponge counts were correctThe patient was transferred to the Recovery Room in stable condition.

## 2017-09-08 NOTE — OR NURSING
Rotbot time out completed with pre incision time out.  All DaVinci instruments inspected before case by Adria Cooks .  All instruments appear to be intact.

## 2017-09-08 NOTE — TRANSFER OF CARE
"Anesthesia Transfer of Care Note    Patient: Rickey Rios    Procedure(s) Performed: Procedure(s) (LRB):  ROBOTIC ASSISTED LAPAROSCOPIC PROSTATECTOMY (N/A)    Patient location: PACU    Anesthesia Type: general    Transport from OR: Transported from OR on 6-10 L/min O2 by face mask with adequate spontaneous ventilation    Post pain: adequate analgesia    Post assessment: no apparent anesthetic complications    Post vital signs: stable    Level of consciousness: awake    Nausea/Vomiting: no nausea/vomiting    Complications: none    Transfer of care protocol was followed      Last vitals:   Visit Vitals  BP (!) 93/50 (BP Location: Right arm, Patient Position: Lying)   Pulse 83   Temp 36 °C (96.8 °F) (Temporal)   Resp 20   Ht 6' 2" (1.88 m)   Wt 111.1 kg (245 lb)   SpO2 97%   BMI 31.46 kg/m²     "

## 2017-09-09 VITALS
DIASTOLIC BLOOD PRESSURE: 70 MMHG | BODY MASS INDEX: 31.44 KG/M2 | SYSTOLIC BLOOD PRESSURE: 132 MMHG | OXYGEN SATURATION: 95 % | HEIGHT: 74 IN | TEMPERATURE: 97 F | WEIGHT: 245 LBS | RESPIRATION RATE: 20 BRPM | HEART RATE: 111 BPM

## 2017-09-09 LAB
BASOPHILS # BLD AUTO: 0.03 K/UL
BASOPHILS # BLD AUTO: 0.04 K/UL
BASOPHILS NFR BLD: 0.2 %
BASOPHILS NFR BLD: 0.3 %
DIFFERENTIAL METHOD: ABNORMAL
DIFFERENTIAL METHOD: ABNORMAL
EOSINOPHIL # BLD AUTO: 0.1 K/UL
EOSINOPHIL # BLD AUTO: 0.1 K/UL
EOSINOPHIL NFR BLD: 0.8 %
EOSINOPHIL NFR BLD: 0.8 %
ERYTHROCYTE [DISTWIDTH] IN BLOOD BY AUTOMATED COUNT: 14.4 %
ERYTHROCYTE [DISTWIDTH] IN BLOOD BY AUTOMATED COUNT: 14.5 %
HCT VFR BLD AUTO: 36.9 %
HCT VFR BLD AUTO: 37.3 %
HGB BLD-MCNC: 12.9 G/DL
HGB BLD-MCNC: 13 G/DL
LYMPHOCYTES # BLD AUTO: 2.7 K/UL
LYMPHOCYTES # BLD AUTO: 3.1 K/UL
LYMPHOCYTES NFR BLD: 17.6 %
LYMPHOCYTES NFR BLD: 21.1 %
MCH RBC QN AUTO: 28.2 PG
MCH RBC QN AUTO: 28.3 PG
MCHC RBC AUTO-ENTMCNC: 34.9 G/DL
MCHC RBC AUTO-ENTMCNC: 35 G/DL
MCV RBC AUTO: 81 FL
MCV RBC AUTO: 81 FL
MONOCYTES # BLD AUTO: 1.8 K/UL
MONOCYTES # BLD AUTO: 2 K/UL
MONOCYTES NFR BLD: 12.4 %
MONOCYTES NFR BLD: 12.6 %
NEUTROPHILS # BLD AUTO: 10.6 K/UL
NEUTROPHILS # BLD AUTO: 9.5 K/UL
NEUTROPHILS NFR BLD: 65 %
NEUTROPHILS NFR BLD: 68.5 %
PLATELET # BLD AUTO: 225 K/UL
PLATELET # BLD AUTO: 231 K/UL
PMV BLD AUTO: 8.9 FL
PMV BLD AUTO: 8.9 FL
RBC # BLD AUTO: 4.57 M/UL
RBC # BLD AUTO: 4.6 M/UL
WBC # BLD AUTO: 14.57 K/UL
WBC # BLD AUTO: 15.5 K/UL

## 2017-09-09 PROCEDURE — 36415 COLL VENOUS BLD VENIPUNCTURE: CPT

## 2017-09-09 PROCEDURE — 25000003 PHARM REV CODE 250: Performed by: STUDENT IN AN ORGANIZED HEALTH CARE EDUCATION/TRAINING PROGRAM

## 2017-09-09 PROCEDURE — 63600175 PHARM REV CODE 636 W HCPCS: Performed by: STUDENT IN AN ORGANIZED HEALTH CARE EDUCATION/TRAINING PROGRAM

## 2017-09-09 PROCEDURE — 25000003 PHARM REV CODE 250: Performed by: UROLOGY

## 2017-09-09 PROCEDURE — 85025 COMPLETE CBC W/AUTO DIFF WBC: CPT

## 2017-09-09 PROCEDURE — 63600175 PHARM REV CODE 636 W HCPCS: Performed by: UROLOGY

## 2017-09-09 RX ORDER — INDOMETHACIN 75 MG/1
75 CAPSULE, EXTENDED RELEASE ORAL
Status: DISCONTINUED | OUTPATIENT
Start: 2017-09-09 | End: 2017-09-09 | Stop reason: HOSPADM

## 2017-09-09 RX ORDER — SIMETHICONE 80 MG
1 TABLET,CHEWABLE ORAL 3 TIMES DAILY PRN
Status: DISCONTINUED | OUTPATIENT
Start: 2017-09-09 | End: 2017-09-09 | Stop reason: HOSPADM

## 2017-09-09 RX ORDER — KETOROLAC TROMETHAMINE 15 MG/ML
15 INJECTION, SOLUTION INTRAMUSCULAR; INTRAVENOUS EVERY 8 HOURS
Status: DISCONTINUED | OUTPATIENT
Start: 2017-09-09 | End: 2017-09-09 | Stop reason: HOSPADM

## 2017-09-09 RX ORDER — PANTOPRAZOLE SODIUM 40 MG/1
40 TABLET, DELAYED RELEASE ORAL DAILY
Status: DISCONTINUED | OUTPATIENT
Start: 2017-09-09 | End: 2017-09-09 | Stop reason: HOSPADM

## 2017-09-09 RX ADMIN — KETOROLAC TROMETHAMINE 15 MG: 15 INJECTION, SOLUTION INTRAMUSCULAR; INTRAVENOUS at 08:09

## 2017-09-09 RX ADMIN — PANTOPRAZOLE SODIUM 40 MG: 40 TABLET, DELAYED RELEASE ORAL at 08:09

## 2017-09-09 RX ADMIN — HEPARIN SODIUM 5000 UNITS: 5000 INJECTION, SOLUTION INTRAVENOUS; SUBCUTANEOUS at 05:09

## 2017-09-09 RX ADMIN — SIMETHICONE CHEW TAB 80 MG 80 MG: 80 TABLET ORAL at 08:09

## 2017-09-09 RX ADMIN — INDOMETHACIN 75 MG: 75 CAPSULE, EXTENDED RELEASE ORAL at 10:09

## 2017-09-09 RX ADMIN — ACETAMINOPHEN 1000 MG: 10 INJECTION, SOLUTION INTRAVENOUS at 08:09

## 2017-09-09 RX ADMIN — OXYCODONE HYDROCHLORIDE 10 MG: 5 TABLET ORAL at 10:09

## 2017-09-09 RX ADMIN — BUPROPION HYDROCHLORIDE 300 MG: 300 TABLET, EXTENDED RELEASE ORAL at 08:09

## 2017-09-09 RX ADMIN — OXYCODONE HYDROCHLORIDE 10 MG: 5 TABLET ORAL at 06:09

## 2017-09-09 RX ADMIN — POLYETHYLENE GLYCOL 3350 17 G: 17 POWDER, FOR SOLUTION ORAL at 08:09

## 2017-09-09 RX ADMIN — VALSARTAN 160 MG: 160 TABLET, FILM COATED ORAL at 08:09

## 2017-09-09 RX ADMIN — SIMETHICONE CHEW TAB 80 MG 80 MG: 80 TABLET ORAL at 10:09

## 2017-09-09 NOTE — DISCHARGE INSTRUCTIONS
What to expect with your Robotic Assisted Laparoscopic Prostatectomy.  [facility]  Updated 06/10/2011   Surgery  o Your surgery will last between 1.5 - 3 hours.   After surgery  o You may or may not have a drain that is shaped like a grenade and put to suction  - This drain usually comes out on Post Op Day (POD) 1. It will remain if the output is high and the nurses will teach you how to record the output and you will come back a few days after you leave to have the drain removed  o You will have a catheter after your surgery.  - This catheter protects your tissues to allow for healing between the bladder neck and the urethra now that the prostate has been removed.  - NO ONE IS TO REMOVE THIS CATHETER OR CHANGE THE CATHETER OTHER THAN SOMEONE FROM [facility].  - If you have to go to the ER because your catheter is not draining, come to the [facility] if possible and they will call us if they are not able to irrigate your catheter.  - If you live out of town and have to go to a local ER, then DO NOT let that doctor remove your catheter. If they are unable to irrigate the catheter or are having troubles with it, have them page the [facility] resident on call immediately at [phone] to help answer any questions.  - The catheter should come out in 7-10 days.   - You will have a midline incision after surgery where the prostate was removed. This will be sewn with absorbable suture so you do not need to worry about having the sutures removed.  - You will have smaller incisions where the instruments were inserted that are also sewn closed with absorbable sutures.  o The night of surgery we expect and hope that you will:  - Walk - walking helps get the bowels moving. Also after your surgery, you are at a risk for a deep venous thrombosis (which is a clot in the legs that can form by remaining inactive or still for extended periods of time) and this can travel to your lungs and make you  feel short of breath. This is a very serious condition. Walking helps prevent a DVT from occurring.  - Eat - you do not have to eat a whole meal, but we want to make sure you can tolerate liquid and/or solid food without nausea and vomiting  - Use your incentive spirometer - this is the breathing apparatus that helps you expand your lungs. If and when you have pain you will not want to take deep breaths. But if you dont take deep breaths, you are at risk for pneumonia. The incentive spirometer will help prevent this from occurring by expanding your lungs.  o Symptoms you may experience immediately post-op:  - Bloating and/or shoulder pain - when we do this operation, we fill up your abdominal cavity with gas to better help us visualize the organs and allow our instruments to fit. After the surgery, not all the air can be removed and your body will eventually absorb this small amount of air. However this can make you feel bloated. In addition, when you sit up, the air can sit right under a muscle (the diaphragm) which has connecting nerves to the shoulders, which could explain why you have shoulder pain.  - Do not expect necessarily to have gas or to have a bowel movement - this goes along with the bloating, you may feel like you want to pass gas or have a bowel movement but you cant. This is normal and you will feel like this for a couple days. There are no pills to help with this. Small walks throughout the day should help with this.  - Pain - your pain should be able to be controlled with medicines by mouth that we prescribe. It is important for you to tell us if you are on any pain medications at home before the surgery as you may need stronger pain meds while in the hospital.  - Bladder spasms - this feels like you have to urinate but cant. Sometimes it can be due to clots clogging up your catheter. The nurse will irrigate the catheter, if there are no clots we can prescribe you an anti-spasmodic. We can also  send you home with a prescription for one.   If you go home on anti-spasmodics, do not take one the morning of your appointment to have your catheter removed or you may not be able to void.   You can go home when:  o Pain is controlled with medicines by mouth  o You are able to walk without difficulty or pain  o You are tolerating a regulating diet  o Your catheter is draining freely   When you go home:  o Catheter care  - Blood in your urine (hematuria) is normal. However if you are having clots or your catheter stops draining and you are experiencing abdominal pain, go to the ER.  - If the tip of your penis hurts with the catheter in place, you can put some Vaseline at the end of the catheter to help lubricate the catheter.  o Activity  - Continue to walk - small walks throughout the day are better than one long walk.   - Do not lift anything greater than 8 pounds for 6 weeks - we want your abdominal wall muscles to heal.  o Bowel Movements - Do not strain to have a bowel movement - the pain medicines will make you constipated. That is why we also ask you to take colace 2-3 x per day to help keep your bowels regular. If you are still having trouble, then you can also add Miralax once a day. Do not take any stool softeners if you are having diarrhea.  o Drain - If you have a drain (not your catheter, but a separate drain) then record the output and bring it with you to your next appointment  o Smoking - If you smoke, we encourage you to STOP. Smoking interferes with the healing process and will prolong your healing with continued smoking.  o Driving - Do not drive while you are on pain meds or with your catheter in place.  o Bathing - If you do not have a drain, you can shower 48 hours after your surgery. If you do have a drain, sponge bathe only until the drain is out.  o Dressing - you can remove the dressings if there is no drainage or change them as needed if there is. The little sterile band-aid strips will  fall off on their own in 10-14 days. If they have not fallen off then you can remove them yourself after 14 days.  o Kegels - do not start your Kegels until after your catheter is out.  o Restarting medicines -especially blood thinners (Coumadin, ASA,Plavix), Fish Oil. Discuss this with your physician prior to discharge   When to return to the ER  o Fever - If you have a fever >101.5, this could be due to a number of reasons such as infection of the urine or incision. If your catheter has been removed, you could possibly have a leak. It would be best to come to the ER so they can better evaluate you.  o Severe pain - pain is expected, but severe or new onset of pain is not normal.   o Inability to tolerate food or liquid with nausea and vomiting - it would be best to go to the ER for them to better evaluate you.      Consents to be obtained - Surgical and Blood.  Before you sign the consent, understand the following risks:  o Surgery consent  - Bleeding - the prostate and its surrounding vessels are very vascular and bleeding can occur requiring a blood transfusion.  - Erectile dysfunction (impotence) - you will not have erections after surgery immediately post-op.  Your potency depends on your age and if you were potent beforehand. Nerve-sparing also helps. However, it does not guarantee erectile function. There are options to discuss after surgery if you still remain impotent.  - Incontinence -95% of people regain their continence by one year. How fast you become continent again cannot be determined beforehand. Kegels, however, do help and you should start your kegels after your catheter is out.   - Blockage of ureters (the drainage tube between the kidney and the bladder)- if this occurrs, you will need another procedure to unblock the ureters.  - Stricture of the urethra or Bladder Neck Contracture - any time the urethra is instrumented, there is a risk of having strictures and this would require another  procedure to dilate the stricture. Bladder neck contracture can occur after any type of anastomotic procedure. You would present with difficulty voiding or emptying your bladder.  - Damage to rectal wall, bowel, liver, spleen or any of the surrounding organs   - Infection of urine or incision requiring further treatment  - Pulmonary Emboli (blood clots) - this is why we ask you to walk around after surgery and the night of surgery to help prevent clots  - Need for conversion to open procedure   - Air embolus - to introduce air into the abdomen a needle is inserted into the abdomen, if this enters a vein or artery it can cause an embolus that travels to the heart and cause death.  - Anastomotic leak - there is always a chance that the tissues have not healed when the catheter is removed and you may possibly have a leak. You may possibly present or complain of abdominal pain, weakness or fever. If you have these symptoms return to the ER. If you have a leak you will need a catheter to be replaced, likely under direct vision with a camera.   - Small bowel obstruction (SBO) - after surgery, people can have adhesions that form from inflammation caused by surgery and this can cause a SBO, which would present with nausea and vomiting and unable to pass stool.  - Ileus - your bowels can be in shock after surgery and you will present with nausea and vomiting. Sometimes this requires a tube that is placed into your stomach until your bowels start working again, this is temporary.  - Acute or chronic pain  - Re-insertion of catheter if there is a urinary leak  - Scars  - Death, paralysis from the neck or waist down, loss of limb  o Blood consents  - Risk of HIV and Hepatitis or any other blood borne disease  - Risk of allergic reaction to blood treated symptomatically  -

## 2017-09-09 NOTE — DISCHARGE SUMMARY
Discharge summary RALP    Admit Date: 9/8/2017    Discharge Date: 09/09/2017    Admit Dx: Prostate Cancer    Discharge Dx:  Past Medical History:   Diagnosis Date    ADD (attention deficit disorder)     Anxiety     Arthritis     Diabetes mellitus, type 2     Dyslexia     Hypertension     preventative     BLAIRE on CPAP     Prostate cancer 8/24/2017       Indications: Rickey Rios is a 56 y.o. male  With [place] 3+3=6 prostate cancer elected for surgery after careful consideration and discussion of options.    Procedure performed:  RALP  BPLND    Hospital course: The pt tolerated the procedure well. He was transferred to recovery post-op and then to the floor. Once on the floor his diet was advanced and he ambulated the night of surgery. On POD 1 the pt was tolerating a regular diet, ambulating without difficulty. His pain was well controlled. His garrett was draining clear light pink urine. His incisions were c/d/i. His abdomen was soft and appropriately tender. His FATOU drain was serosanguinous and did have less than 80cc per shift and was removed prior to discharge.    Discharge Medications:  1. Restart all home meds  2. Percocet as needed for pain  3. Miralax 17go po qday    Discharge instructions:  1. No heavy lifting >10lbs x 6 weeks  2. Return to the ER with fever, severe abdominal pain, significant drainage from wounds  3. Catheter not draining  4. Catheter cannot be removed by anyone except Urology physician or NP at [facility] post-op  5. Please see attached detailed post-op instructions    Follow up:  1 week with Dr. Grnat Samuels MD

## 2017-09-09 NOTE — PLAN OF CARE
Problem: Patient Care Overview  Goal: Plan of Care Review  Outcome: Outcome(s) achieved Date Met: 09/09/17  Pt AAOx3, VSS, no complaints at this time, family at bedside,  no s/s of skin breakdown noted, no falls noted as precautions remain. Will resume with plan of care.

## 2017-09-09 NOTE — SUBJECTIVE & OBJECTIVE
Interval History:   No acute events overnight  No nausea, tolerating regular diet  Pain is well controlled  Ambulating well  Complaining of some discomfort under right rib    Review of Systems  Objective:     Temp:  [96.8 °F (36 °C)-98.9 °F (37.2 °C)] 97.4 °F (36.3 °C)  Pulse:  [] 111  Resp:  [9-22] 20  SpO2:  [89 %-100 %] 95 %  BP: ()/(50-70) 132/70     Body mass index is 31.46 kg/m².      Date 09/09/17 0700 - 09/10/17 0659   Shift 1584-9497 0951-0019 5531-0294 24 Hour Total   I  N  T  A  K  E   Shift Total  (mL/kg)       O  U  T  P  U  T   Urine  (mL/kg/hr) 450   450    Drains 60   60    Shift Total  (mL/kg) 510  (4.6)   510  (4.6)   Weight (kg) 111.1 111.1 111.1 111.1          Drains     Drain                 Urethral Catheter 09/08/17 0757 Non-latex;Straight-tip 16 Fr. 1 day         Closed/Suction Drain 09/08/17 1111 Left Abdomen Bulb 15 Fr. less than 1 day                Physical Exam   Constitutional: No distress.   HENT:   Head: Normocephalic and atraumatic.   Eyes: Conjunctivae are normal. No scleral icterus.   Neck: Normal range of motion.   Cardiovascular: Normal rate.    Pulmonary/Chest: Effort normal. No respiratory distress.   Abdominal: Soft. He exhibits no distension. There is tenderness (appropriate). There is no rebound and no guarding.   Incisions c/d/i  garrett draining clear yellow  FATOU draining ss   Musculoskeletal: Normal range of motion.   SCDs in place   Neurological: He is alert.   Skin: Skin is warm and dry. He is not diaphoretic.     Psychiatric: He has a normal mood and affect.       Significant Labs:    BMP:    Recent Labs  Lab 09/08/17  1258      K 4.1      CO2 23   BUN 24*   CREATININE 0.8   CALCIUM 7.7*       CBC:     Recent Labs  Lab 09/08/17  1258 09/09/17  0704 09/09/17  0749   WBC 10.70 14.57* 15.50*   HGB 12.6* 13.0* 12.9*   HCT 36.1* 37.3* 36.9*    231 225       All pertinent labs results from the past 24 hours have been reviewed.    Significant  Imaging:  All pertinent imaging results/findings from the past 24 hours have been reviewed.

## 2017-09-09 NOTE — PROGRESS NOTES
Pt discharged. Instructions and prescriptions given and explained. Pt verbalized understanding with no questions. Pt AAOx3, Mcdaniels care teaching with return of demonstration of leg bag and standard bag use. Waiting on tranport

## 2017-09-09 NOTE — ASSESSMENT & PLAN NOTE
- IV tylenol, PO oxycodone for pain control  - regular diet  - dc MIVF  - CBC, BMP in am  - Ambulate pm of surgery and qid  - Drains: FATOU removed, Home with garrett, leg bag,  bag, and teaching  - Prophylaxis: IS, SCDs, GI ppx    DC home this am

## 2017-09-09 NOTE — PROGRESS NOTES
[facility]  Urology  Progress Note    Patient Name: Rickey Rios  MRN: [id number]  Admission Date: 9/8/2017  Hospital Length of Stay: 1 days  Code Status: No Order   Attending Provider: Tariq Burns MD   Primary Care Physician: Min Kaur MD    Subjective:     HPI:  Rickey Rios is a 56 y.o. male POD 1 s/p RALP    Interval History:   No acute events overnight  No nausea, tolerating regular diet  Pain is well controlled  Ambulating well  Complaining of some discomfort under right rib    Review of Systems  Objective:     Temp:  [96.8 °F (36 °C)-98.9 °F (37.2 °C)] 97.4 °F (36.3 °C)  Pulse:  [] 111  Resp:  [9-22] 20  SpO2:  [89 %-100 %] 95 %  BP: ()/(50-70) 132/70     Body mass index is 31.46 kg/m².      Date 09/09/17 0700 - 09/10/17 0659   Shift 0553-3549 2817-9315 3863-4205 24 Hour Total   I  N  T  A  K  E   Shift Total  (mL/kg)       O  U  T  P  U  T   Urine  (mL/kg/hr) 450   450    Drains 60   60    Shift Total  (mL/kg) 510  (4.6)   510  (4.6)   Weight (kg) 111.1 111.1 111.1 111.1          Drains     Drain                 Urethral Catheter 09/08/17 0757 Non-latex;Straight-tip 16 Fr. 1 day         Closed/Suction Drain 09/08/17 1111 Left Abdomen Bulb 15 Fr. less than 1 day                Physical Exam   Constitutional: No distress.   HENT:   Head: Normocephalic and atraumatic.   Eyes: Conjunctivae are normal. No scleral icterus.   Neck: Normal range of motion.   Cardiovascular: Normal rate.    Pulmonary/Chest: Effort normal. No respiratory distress.   Abdominal: Soft. He exhibits no distension. There is tenderness (appropriate). There is no rebound and no guarding.   Incisions c/d/i  garrett draining clear yellow  FATOU draining ss   Musculoskeletal: Normal range of motion.   SCDs in place   Neurological: He is alert.   Skin: Skin is warm and dry. He is not diaphoretic.     Psychiatric: He has a normal mood and affect.       Significant Labs:    BMP:    Recent Labs  Lab  09/08/17  1258      K 4.1      CO2 23   BUN 24*   CREATININE 0.8   CALCIUM 7.7*       CBC:     Recent Labs  Lab 09/08/17  1258 09/09/17  0704 09/09/17  0749   WBC 10.70 14.57* 15.50*   HGB 12.6* 13.0* 12.9*   HCT 36.1* 37.3* 36.9*    231 225       All pertinent labs results from the past 24 hours have been reviewed.    Significant Imaging:  All pertinent imaging results/findings from the past 24 hours have been reviewed.        Assessment/Plan:     * Prostate cancer    - IV tylenol, PO oxycodone for pain control  - regular diet  - dc MIVF  - CBC, BMP in am  - Ambulate pm of surgery and qid  - Drains: FATOU removed, Home with garrett, leg bag,  bag, and teaching  - Prophylaxis: IS, SCDs, GI ppx    DC home this am            VTE Risk Mitigation         Ordered     heparin (porcine) injection 5,000 Units  Every 8 hours     Route:  Subcutaneous        09/08/17 1228     Medium Risk of VTE  Once      09/08/17 0522     Place sequential compression device  Until discontinued      09/08/17 0522          Tiffanie Montez MD  Urology  [facility]

## 2017-09-11 NOTE — H&P
Urology ([facility]) H&P for upcoming procedure  Staff:  Dr. Tariq Burns MD    Is this patient in a research study?  No    CC: prostate adencarcinoma    HPI:  Rickey Rios is a 56 y.o. male with [id number] [place] 3+3=6 prostate adenocarcinoma.  He also has ankylosing spondylitis.    The patient initially presented with elevated PSA.  TRUS biopsy revealed the following:       LEFT               RIGHT  BASE   [place] 3+3 2/2 30% No evidence of malignancy  MID   No evidence of malignancy No evidence of malignancy  APEX   No evidence of malignancy No evidence of malignancy    Date of Biopsy: 8/1/2017  PSA: 5.7  Volume: 42 cc  Voiding complaints: absent  ED: present  Incontinence: absent    ROS:  Neg except per HPI, specifically no bone pain, no unintentional weight loss, no anorexia, no night sweats    Past Medical History:   Diagnosis Date    ADD (attention deficit disorder)     Anxiety     Arthritis     Diabetes mellitus, type 2     Dyslexia     Hypertension     preventative     BLAIRE on CPAP     Prostate cancer 8/24/2017       Past Surgical History:   Procedure Laterality Date    BACK SURGERY      HEEL SPUR SURGERY      NECK SURGERY         Social History     Social History    Marital status:      Spouse name: N/A    Number of children: 4    Years of education: N/A     Occupational History    federal employee      Social History Main Topics    Smoking status: Never Smoker    Smokeless tobacco: Never Used    Alcohol use No    Drug use: No    Sexual activity: Yes     Partners: Female     Other Topics Concern    None     Social History Narrative    None       Family History   Problem Relation Age of Onset    Pulmonary fibrosis Mother     Diabetes Father     Atrial fibrillation Brother        Review of patient's allergies indicates:   Allergen Reactions    Phenergan [promethazine] Edema     Throat/mouth swelling    Metformin Nausea And Vomiting    Zofran [ondansetron hcl (pf)]  "Hives and Edema     Throat swelling & mouth       No current facility-administered medications on file prior to encounter.      Current Outpatient Prescriptions on File Prior to Encounter   Medication Sig Dispense Refill    buPROPion (WELLBUTRIN XL) 300 MG 24 hr tablet       glimepiride (AMARYL) 4 MG tablet       indomethacin (INDOCIN SR) 75 mg CpSR CR capsule       INVOKANA 300 mg Tab tablet       LEVEMIR FLEXTOUCH 100 unit/mL (3 mL) InPn pen 24 Units 2 (two) times daily.       lorazepam (ATIVAN) 0.5 MG tablet Take 0.5 mg by mouth every 6 (six) hours as needed for Anxiety.      STRATTERA 80 mg capsule       TRULICITY 0.75 mg/0.5 mL PnIj       valsartan (DIOVAN) 160 MG tablet       BD ULTRA-FINE BERNARDO PEN NEEDLES 32 gauge x 5/32" Ndle       FREESTYLE LANCETS 28 gauge lancets       FREESTYLE LITE STRIPS Strp       sildenafil (REVATIO) 20 mg Tab Take 1 tablet (20 mg total) by mouth continuous prn (Take 3 tablets, 1 hour prior to intercource, can take up to 5 tablets at once, but do not exceed 5 tablets.). 30 tablet 11       Anticoagulation:  No    Physical Exam:  AAOx3, NAD  NC/AT, EOMI, PER, sclerae anicteric, speech normal, tongue midline  Nl effort, unlabored respirations  Regular rate  Soft, non-tender, non-distended   Scars: No abdominal scars, 4 inch vertical midline low back scar  Prostate 40g smooth, no nodules    Labs:  Urine dipstick today shows negative for all components.    Lab Results   Component Value Date    WBC 15.50 (H) 09/09/2017    HGB 12.9 (L) 09/09/2017    HCT 36.9 (L) 09/09/2017    MCV 81 (L) 09/09/2017     09/09/2017       BMP  Lab Results   Component Value Date     09/08/2017    K 4.1 09/08/2017     09/08/2017    CO2 23 09/08/2017    BUN 24 (H) 09/08/2017    CREATININE 0.8 09/08/2017    CALCIUM 7.7 (L) 09/08/2017    ANIONGAP 8 09/08/2017    ESTGFRAFRICA >60.0 09/08/2017    EGFRNONAA >60.0 09/08/2017       Lab Results   Component Value Date    PSADIAG 5.7 (H) " 07/12/2017    PSADIAG 4.6 (H) 05/18/2017       Imaging:  None    Assessment: Rickey Rios is a 56 y.o. male with [id number] [place] 3+3=6 prostate adenocarcinoma.      Plan:   1. To OR for RALP with BPLND  2. Consents signed   3. I have explained the risk, benefits, and alternatives of the procedure in detail. The patient voices understanding and all questions have been answered. The patient agrees to proceed as planned.     Aayush Samuels MD

## 2017-09-11 NOTE — ANESTHESIA POSTPROCEDURE EVALUATION
"Anesthesia Post Evaluation    Patient: Rickey Rios    Procedure(s) Performed: Procedure(s) (LRB):  ROBOTIC ASSISTED LAPAROSCOPIC PROSTATECTOMY (N/A)    Final Anesthesia Type: general  Patient location during evaluation: PACU  Patient participation: Yes- Able to Participate  Level of consciousness: awake and alert  Post-procedure vital signs: reviewed and stable  Pain management: adequate  Airway patency: patent  PONV status at discharge: No PONV  Anesthetic complications: no      Cardiovascular status: stable  Respiratory status: unassisted, spontaneous ventilation and room air  Hydration status: euvolemic  Follow-up not needed.        Visit Vitals  /70 (BP Location: Right arm, Patient Position: Sitting)   Pulse (!) 111   Temp 36.3 °C (97.4 °F) (Oral)   Resp 20   Ht 6' 2" (1.88 m)   Wt 111.1 kg (245 lb)   SpO2 95%   BMI 31.46 kg/m²       Pain/Alyson Score: No Data Recorded      "

## 2017-09-12 ENCOUNTER — TELEPHONE (OUTPATIENT)
Dept: UROLOGY | Facility: CLINIC | Age: 57
End: 2017-09-12

## 2017-09-12 NOTE — TELEPHONE ENCOUNTER
I spoke with patient, and reassured him the bloody urine/debrise  is expected and to continue drinking water and increase his dose of miralax and can use ducolax to get his bowels moving, patient encourage to walk as much as possible. Patient report eating well. Denies any nausea/vomiting.

## 2017-09-15 ENCOUNTER — OFFICE VISIT (OUTPATIENT)
Dept: UROLOGY | Facility: CLINIC | Age: 57
End: 2017-09-15
Payer: COMMERCIAL

## 2017-09-15 VITALS — RESPIRATION RATE: 20 BRPM | WEIGHT: 245 LBS | HEIGHT: 74 IN | BODY MASS INDEX: 31.44 KG/M2

## 2017-09-15 DIAGNOSIS — Z98.890 POST-OPERATIVE STATE: Primary | ICD-10-CM

## 2017-09-15 DIAGNOSIS — Z90.79 HISTORY OF RADICAL PROSTATECTOMY: ICD-10-CM

## 2017-09-15 DIAGNOSIS — C61 PROSTATE CANCER: ICD-10-CM

## 2017-09-15 DIAGNOSIS — N52.31 ERECTILE DYSFUNCTION FOLLOWING RADICAL PROSTATECTOMY: ICD-10-CM

## 2017-09-15 DIAGNOSIS — Z46.6 ENCOUNTER FOR FOLEY CATHETER REMOVAL: ICD-10-CM

## 2017-09-15 PROCEDURE — 99024 POSTOP FOLLOW-UP VISIT: CPT | Mod: S$GLB,,, | Performed by: NURSE PRACTITIONER

## 2017-09-15 PROCEDURE — 99999 PR PBB SHADOW E&M-EST. PATIENT-LVL V: CPT | Mod: PBBFAC,,, | Performed by: NURSE PRACTITIONER

## 2017-09-15 NOTE — PROGRESS NOTES
Subjective:       Patient ID: Rickey Rios is a 56 y.o. male.    Chief Complaint: Post-op Evaluation    Rickey Rios is a 56 y.o. Male with Prostate Cancer; s/p RALP 09/08/2017 with Dr. Burns  Matthews 6 (3+3); (-) extension, margins, & SV; pT2, pN0, pMx  He was initially diagnosed 8/01/2017 via bx with Dr. Tobias.    He is here today for initial one week post op visit.  He states garrett draining, some hematuria.  He only taking pain pill at night; taking miralax so no issues with constipation.                           PSA                  5.7 (H)             07/12/2017                 PSA                  4.6 (H)             05/18/2017                    Past Medical History:  No date: ADD (attention deficit disorder)  No date: Anxiety  No date: Arthritis  No date: Diabetes mellitus, type 2  No date: Dyslexia  No date: Hypertension      Comment: preventative   No date: BLAIRE on CPAP  8/24/2017: Prostate cancer    Past Surgical History:  No date: BACK SURGERY  No date: HEEL SPUR SURGERY  No date: NECK SURGERY    Review of patient's family history indicates:    Pulmonary fibrosis             Mother                    Diabetes                       Father                    Atrial fibrillation            Brother                     Social History    Marital status:              Spouse name:                       Years of education:                 Number of children: 4             Occupational History  Occupation          Employer            Comment               federal employee                            Social History Main Topics    Smoking status: Never Smoker                                                                Smokeless tobacco: Never Used                        Alcohol use: No              Drug use: No              Sexual activity: Yes               Partners with: Female    Other Topics            Concern    None on file    Social History Narrative    None on  "file        Allergies:  Phenergan (promethazine); Zofran (ondansetron hcl (pf)); and Metformin    Medications:  Current Outpatient Prescriptions:   BD ULTRA-FINE BERNARDO PEN NEEDLES 32 gauge x 5/32" Ndle, , Disp: , Rfl:   buPROPion (WELLBUTRIN XL) 300 MG 24 hr tablet, , Disp: , Rfl:   FREESTYLE LANCETS 28 gauge lancets, , Disp: , Rfl:   FREESTYLE LITE STRIPS Strp, , Disp: , Rfl:   glimepiride (AMARYL) 4 MG tablet, , Disp: , Rfl:   indomethacin (INDOCIN SR) 75 mg CpSR CR capsule, , Disp: , Rfl:   INVOKANA 300 mg Tab tablet, , Disp: , Rfl:   LEVEMIR FLEXTOUCH 100 unit/mL (3 mL) InPn pen, 24 Units 2 (two) times daily. , Disp: , Rfl:   lorazepam (ATIVAN) 0.5 MG tablet, Take 0.5 mg by mouth every 6 (six) hours as needed for Anxiety., Disp: , Rfl:   oxycodone-acetaminophen (PERCOCET) 5-325 mg per tablet, Take 1 tablet by mouth every 4 (four) hours as needed for Pain., Disp: 41 tablet, Rfl: 0  polyethylene glycol (GLYCOLAX) 17 gram PwPk, Take 17 g by mouth once daily., Disp: 30 packet, Rfl: 0  sildenafil (REVATIO) 20 mg Tab, Take 1 tablet (20 mg total) by mouth continuous prn (Take 3 tablets, 1 hour prior to intercource, can take up to 5 tablets at once, but do not exceed 5 tablets.)., Disp: 30 tablet, Rfl: 11  STRATTERA 80 mg capsule, , Disp: , Rfl:   sulfamethoxazole-trimethoprim 800-160mg (BACTRIM DS) 800-160 mg Tab, Take 1 tablet by mouth 2 (two) times daily., Disp: 14 tablet, Rfl: 0  TRULICITY 0.75 mg/0.5 mL PnIj, , Disp: , Rfl:   valsartan (DIOVAN) 160 MG tablet, , Disp: , Rfl:           Review of Systems   Constitutional: Negative.  Negative for activity change, appetite change and fever.   HENT: Negative.  Negative for facial swelling and trouble swallowing.    Eyes: Negative.    Respiratory: Negative.  Negative for shortness of breath.    Cardiovascular: Negative.  Negative for chest pain and palpitations.   Gastrointestinal: Negative for abdominal pain, constipation, diarrhea, nausea and vomiting. "   Genitourinary: Positive for hematuria. Negative for difficulty urinating, dysuria, enuresis, flank pain, frequency, genital sores, nocturia, penile pain, scrotal swelling, testicular pain and urgency.        Mcdaniels draining well   Musculoskeletal: Negative for back pain, gait problem and neck stiffness.   Skin: Positive for wound.        Incisions closed and healing     Neurological: Negative for dizziness, tremors, seizures, syncope, speech difficulty, light-headedness and headaches.   Hematological: Does not bruise/bleed easily.   Psychiatric/Behavioral: Negative for confusion and hallucinations. The patient is not nervous/anxious.        Objective:      Physical Exam   Nursing note and vitals reviewed.  Constitutional: He is oriented to person, place, and time. He appears well-developed and well-nourished.  Non-toxic appearance. He does not have a sickly appearance.   HENT:   Head: Normocephalic and atraumatic.   Right Ear: External ear normal.   Left Ear: External ear normal.   Nose: Nose normal.   Mouth/Throat: Mucous membranes are normal.   Eyes: Conjunctivae and lids are normal. No scleral icterus.   Neck: Trachea normal, normal range of motion and full passive range of motion without pain. Neck supple. No JVD present. No tracheal deviation present.   Cardiovascular: Normal rate, regular rhythm, S1 normal and S2 normal.    Pulmonary/Chest: Effort normal and breath sounds normal. No respiratory distress. He exhibits no tenderness.   Abdominal: Soft. Normal appearance and bowel sounds are normal. There is no hepatosplenomegaly. There is no tenderness. There is no rebound, no guarding and no CVA tenderness.   Abdominal incisions closed and healing nicely  No surrounding erythema or induration or ecchymosis.     Genitourinary: Penis normal.   Musculoskeletal: Normal range of motion.   Neurological: He is alert and oriented to person, place, and time. He has normal strength.   Skin: Skin is warm, dry and intact.      Psychiatric: He has a normal mood and affect. His behavior is normal. Judgment and thought content normal.       Assessment:       1. Post-operative state    2. Encounter for Mcdaniels catheter removal    3. Prostate cancer    4. Erectile dysfunction following radical prostatectomy    5. History of radical prostatectomy        Plan:         I spent 20 minutes with the patient of which more than half was spent in direct consultation with the patient in regards to our treatment and plan.    Education and recommendations of today's plan of care including home remedies.  Consult Dr. Goodwin for post RALP ED.  Mcdaniels easily removed by nurse and instructed on kegels.   Patient was informed of the post operative pathology report. Answered all his questions. We discussed his post op expectations. This month his focus is on Kegel exercises as instructed by the nurse. Informed only 4 times at day at 10 reps. No more, no less. This is a muscle that has to exercised but also rest to get stronger.    Also emphasized the importance of his activity restrictions. No heavy lifting. Nothing greater than 15 lbs or greater than a gallon of milk. He has had a major surgery and his body needs to heal on inside as well as the outside. Daily walks as well as heart healthy low fat diets were encouraged.     We discussed his daily care of his surgical  Wound sites. Discussed avoiding certain movements and stretches that put strain on the sites. He voiced understanding.    We will reevaluate his bladder control at his next visit.   We will also set up with Dr. Goodiwn to discuss penile rehabilitation to help with his recover and return of his erections.   Both of these issues vary on response with each patient. But not to worry he is in healing process, and we will handle each issue as they come along. He voices understanding.    This next visit we will get his first PSA. Informed that we will set up a schedule of regular visits and PSA  checks. These visits are at one month, then at 4 months, then every 6 months until 5 years after initial surgery.     After 5 years, the visits and PSA checks will be yearly up to 10 years post operatively. Informed these exams are important because we are monitoring for the possibility of reoccurance. The prostate and cancer was removed, but there is a chance that a microscopic cell was left behind. The surgeon can only remove what he can see. Monitoring the PSA as well as ELINA's helps to discover this early if it happens.  Voices understanding. Call us with any questions. We will see him back in 1 month or PRN with any concerns.

## 2017-09-15 NOTE — PATIENT INSTRUCTIONS
Kegel Exercises     Kegel exercises dont require special clothing or equipment. Theyre easy to learn and simple to do. And if you do them right, no one can tell youre doing them, so they can be done almost anywhere. Your doctor, nurse, or physical therapist can answer any questions you have and help you get started.     A Weak Pelvic Floor   The pelvic floor muscles may weaken due to aging, pregnancy and vaginal childbirth, injury, surgery, chronic cough, or lack of exercise. If the pelvic floor is weak, your bladder and other pelvic organs may sag out of place. The urethra may also open too easily and allow urine to leak out. Kegel exercises can help you strengthen your pelvic floor muscles so they can better support the pelvic organs and control urine flow.     How Kegel Exercises Are Done   Try each of the Kegel exercises described below. When youre doing them, try not to move your leg, buttock, or stomach muscles.   While youre urinating, try to stop the flow of urine. Start and stop it as often as you can.   Contract as if you were stopping your urine stream, but do it when youre not urinating.   Tighten your rectum as if trying not to pass gas. Contract your anus, but dont move your buttocks.    Helpful Hints   Do your Kegels as often as you can. The more you do them, the faster youll feel the results.   Pick an activity you do often as a reminder. For instance, do your Kegels every time you sit down.   Tighten your pelvic floor before you sneeze, get up from a chair, cough, laugh, or lift. This protects your pelvic floor from injury and can help prevent urine leakage.      Try to hold each Kegel for a slow count to five. You probably wont be able to hold them for that long at first, but keep practicing. It will get easier as your pelvic floor gets stronger. Eventually, special weights that you place in your vagina may be recommended to help make your Kegels even more effective.    © 1586-5750 [employer], [address] [place] [address], [place] [address]. All rights reserved. This information is not intended as a substitute for professional medical care. Always follow your healthcare professional's instructions.

## 2017-09-16 ENCOUNTER — HOSPITAL ENCOUNTER (OUTPATIENT)
Facility: HOSPITAL | Age: 57
LOS: 1 days | Discharge: HOME | End: 2017-09-17
Attending: EMERGENCY MEDICINE | Admitting: UROLOGY
Payer: COMMERCIAL

## 2017-09-16 DIAGNOSIS — N17.9 AKI (ACUTE KIDNEY INJURY): ICD-10-CM

## 2017-09-16 DIAGNOSIS — N99.89 POSTOPERATIVE URINARY RETENTION: Primary | ICD-10-CM

## 2017-09-16 DIAGNOSIS — R33.8 POSTOPERATIVE URINARY RETENTION: Primary | ICD-10-CM

## 2017-09-16 DIAGNOSIS — Z90.79 S/P PROSTATECTOMY: ICD-10-CM

## 2017-09-16 PROBLEM — N32.89: Status: ACTIVE | Noted: 2017-09-16

## 2017-09-16 PROBLEM — M45.9 ANKYLOSING SPONDYLITIS: Status: ACTIVE | Noted: 2017-09-16

## 2017-09-16 LAB
ANION GAP SERPL CALC-SCNC: 11 MMOL/L
ANION GAP SERPL CALC-SCNC: 15 MMOL/L
BACTERIA #/AREA URNS AUTO: NORMAL /HPF
BASOPHILS # BLD AUTO: 0.04 K/UL
BASOPHILS NFR BLD: 0.3 %
BILIRUB UR QL STRIP: NEGATIVE
BILIRUB UR QL STRIP: NEGATIVE
BUN SERPL-MCNC: 34 MG/DL
BUN SERPL-MCNC: 38 MG/DL
CALCIUM SERPL-MCNC: 10.2 MG/DL
CALCIUM SERPL-MCNC: 9.2 MG/DL
CHLORIDE SERPL-SCNC: 94 MMOL/L
CHLORIDE SERPL-SCNC: 97 MMOL/L
CLARITY UR REFRACT.AUTO: CLEAR
CLARITY UR REFRACT.AUTO: CLEAR
CO2 SERPL-SCNC: 22 MMOL/L
CO2 SERPL-SCNC: 23 MMOL/L
COLOR UR AUTO: YELLOW
COLOR UR AUTO: YELLOW
CREAT SERPL-MCNC: 1.7 MG/DL
CREAT SERPL-MCNC: 2.5 MG/DL
DIFFERENTIAL METHOD: ABNORMAL
EOSINOPHIL # BLD AUTO: 0.1 K/UL
EOSINOPHIL NFR BLD: 0.3 %
ERYTHROCYTE [DISTWIDTH] IN BLOOD BY AUTOMATED COUNT: 14.6 %
EST. GFR  (AFRICAN AMERICAN): 32 ML/MIN/1.73 M^2
EST. GFR  (AFRICAN AMERICAN): 51 ML/MIN/1.73 M^2
EST. GFR  (NON AFRICAN AMERICAN): 27.7 ML/MIN/1.73 M^2
EST. GFR  (NON AFRICAN AMERICAN): 44.1 ML/MIN/1.73 M^2
GLUCOSE SERPL-MCNC: 171 MG/DL
GLUCOSE SERPL-MCNC: 254 MG/DL
GLUCOSE UR QL STRIP: ABNORMAL
GLUCOSE UR QL STRIP: ABNORMAL
HCT VFR BLD AUTO: 40 %
HGB BLD-MCNC: 14.4 G/DL
HGB UR QL STRIP: ABNORMAL
HGB UR QL STRIP: ABNORMAL
KETONES UR QL STRIP: NEGATIVE
KETONES UR QL STRIP: NEGATIVE
LEUKOCYTE ESTERASE UR QL STRIP: NEGATIVE
LEUKOCYTE ESTERASE UR QL STRIP: NEGATIVE
LYMPHOCYTES # BLD AUTO: 0.9 K/UL
LYMPHOCYTES NFR BLD: 6.4 %
MCH RBC QN AUTO: 28.3 PG
MCHC RBC AUTO-ENTMCNC: 36 G/DL
MCV RBC AUTO: 79 FL
MICROSCOPIC COMMENT: NORMAL
MONOCYTES # BLD AUTO: 1 K/UL
MONOCYTES NFR BLD: 6.9 %
NEUTROPHILS # BLD AUTO: 12.3 K/UL
NEUTROPHILS NFR BLD: 84.7 %
NITRITE UR QL STRIP: NEGATIVE
NITRITE UR QL STRIP: NEGATIVE
PH UR STRIP: 5 [PH] (ref 5–8)
PH UR STRIP: 5 [PH] (ref 5–8)
PLATELET # BLD AUTO: 297 K/UL
PMV BLD AUTO: 8.7 FL
POCT GLUCOSE: 141 MG/DL (ref 70–110)
POCT GLUCOSE: 171 MG/DL (ref 70–110)
POCT GLUCOSE: 205 MG/DL (ref 70–110)
POTASSIUM SERPL-SCNC: 4.7 MMOL/L
POTASSIUM SERPL-SCNC: 5 MMOL/L
PROT UR QL STRIP: NEGATIVE
PROT UR QL STRIP: NEGATIVE
RBC # BLD AUTO: 5.08 M/UL
RBC #/AREA URNS AUTO: 1 /HPF (ref 0–4)
SODIUM SERPL-SCNC: 131 MMOL/L
SODIUM SERPL-SCNC: 131 MMOL/L
SP GR UR STRIP: 1.03 (ref 1–1.03)
SP GR UR STRIP: 1.03 (ref 1–1.03)
SQUAMOUS #/AREA URNS AUTO: 0 /HPF
URN SPEC COLLECT METH UR: ABNORMAL
URN SPEC COLLECT METH UR: ABNORMAL
UROBILINOGEN UR STRIP-ACNC: NEGATIVE EU/DL
UROBILINOGEN UR STRIP-ACNC: NEGATIVE EU/DL
WBC # BLD AUTO: 14.58 K/UL
WBC #/AREA URNS AUTO: 0 /HPF (ref 0–5)
YEAST UR QL AUTO: NORMAL

## 2017-09-16 PROCEDURE — 63600175 PHARM REV CODE 636 W HCPCS: Performed by: UROLOGY

## 2017-09-16 PROCEDURE — 25000003 PHARM REV CODE 250: Performed by: STUDENT IN AN ORGANIZED HEALTH CARE EDUCATION/TRAINING PROGRAM

## 2017-09-16 PROCEDURE — 99285 EMERGENCY DEPT VISIT HI MDM: CPT | Mod: 25

## 2017-09-16 PROCEDURE — 96365 THER/PROPH/DIAG IV INF INIT: CPT

## 2017-09-16 PROCEDURE — 96375 TX/PRO/DX INJ NEW DRUG ADDON: CPT

## 2017-09-16 PROCEDURE — 99024 POSTOP FOLLOW-UP VISIT: CPT | Mod: ,,, | Performed by: UROLOGY

## 2017-09-16 PROCEDURE — 63600175 PHARM REV CODE 636 W HCPCS: Performed by: EMERGENCY MEDICINE

## 2017-09-16 PROCEDURE — 63600175 PHARM REV CODE 636 W HCPCS: Performed by: STUDENT IN AN ORGANIZED HEALTH CARE EDUCATION/TRAINING PROGRAM

## 2017-09-16 PROCEDURE — 81001 URINALYSIS AUTO W/SCOPE: CPT

## 2017-09-16 PROCEDURE — 80048 BASIC METABOLIC PNL TOTAL CA: CPT

## 2017-09-16 PROCEDURE — G0378 HOSPITAL OBSERVATION PER HR: HCPCS

## 2017-09-16 PROCEDURE — 36415 COLL VENOUS BLD VENIPUNCTURE: CPT

## 2017-09-16 PROCEDURE — 96361 HYDRATE IV INFUSION ADD-ON: CPT

## 2017-09-16 PROCEDURE — 85025 COMPLETE CBC W/AUTO DIFF WBC: CPT

## 2017-09-16 PROCEDURE — 63600175 PHARM REV CODE 636 W HCPCS: Performed by: ANESTHESIOLOGY

## 2017-09-16 PROCEDURE — 99285 EMERGENCY DEPT VISIT HI MDM: CPT | Mod: ,,, | Performed by: EMERGENCY MEDICINE

## 2017-09-16 PROCEDURE — A4216 STERILE WATER/SALINE, 10 ML: HCPCS | Performed by: STUDENT IN AN ORGANIZED HEALTH CARE EDUCATION/TRAINING PROGRAM

## 2017-09-16 PROCEDURE — 87086 URINE CULTURE/COLONY COUNT: CPT

## 2017-09-16 PROCEDURE — 25000003 PHARM REV CODE 250: Performed by: ANESTHESIOLOGY

## 2017-09-16 RX ORDER — METOCLOPRAMIDE HYDROCHLORIDE 5 MG/ML
5 INJECTION INTRAMUSCULAR; INTRAVENOUS
Status: COMPLETED | OUTPATIENT
Start: 2017-09-16 | End: 2017-09-16

## 2017-09-16 RX ORDER — VALSARTAN 160 MG/1
160 TABLET ORAL DAILY
Status: DISCONTINUED | OUTPATIENT
Start: 2017-09-16 | End: 2017-09-17 | Stop reason: HOSPADM

## 2017-09-16 RX ORDER — ACETAMINOPHEN 10 MG/ML
1000 INJECTION, SOLUTION INTRAVENOUS EVERY 8 HOURS
Status: DISCONTINUED | OUTPATIENT
Start: 2017-09-16 | End: 2017-09-17 | Stop reason: HOSPADM

## 2017-09-16 RX ORDER — BUPROPION HYDROCHLORIDE 150 MG/1
300 TABLET ORAL DAILY
Status: DISCONTINUED | OUTPATIENT
Start: 2017-09-16 | End: 2017-09-17 | Stop reason: HOSPADM

## 2017-09-16 RX ORDER — OXYCODONE AND ACETAMINOPHEN 10; 325 MG/1; MG/1
1 TABLET ORAL EVERY 4 HOURS PRN
Status: DISCONTINUED | OUTPATIENT
Start: 2017-09-16 | End: 2017-09-16

## 2017-09-16 RX ORDER — HEPARIN SODIUM 5000 [USP'U]/ML
5000 INJECTION, SOLUTION INTRAVENOUS; SUBCUTANEOUS EVERY 8 HOURS
Status: DISCONTINUED | OUTPATIENT
Start: 2017-09-16 | End: 2017-09-16

## 2017-09-16 RX ORDER — OXYCODONE HYDROCHLORIDE 5 MG/1
5 TABLET ORAL EVERY 4 HOURS PRN
Status: DISCONTINUED | OUTPATIENT
Start: 2017-09-16 | End: 2017-09-17 | Stop reason: HOSPADM

## 2017-09-16 RX ORDER — OXYCODONE HYDROCHLORIDE 5 MG/1
10 TABLET ORAL EVERY 4 HOURS PRN
Status: DISCONTINUED | OUTPATIENT
Start: 2017-09-16 | End: 2017-09-17 | Stop reason: HOSPADM

## 2017-09-16 RX ORDER — SODIUM CHLORIDE 0.9 % (FLUSH) 0.9 %
3 SYRINGE (ML) INJECTION EVERY 8 HOURS
Status: DISCONTINUED | OUTPATIENT
Start: 2017-09-16 | End: 2017-09-17 | Stop reason: HOSPADM

## 2017-09-16 RX ORDER — IBUPROFEN 200 MG
24 TABLET ORAL
Status: DISCONTINUED | OUTPATIENT
Start: 2017-09-16 | End: 2017-09-16

## 2017-09-16 RX ORDER — INSULIN ASPART 100 [IU]/ML
1-10 INJECTION, SOLUTION INTRAVENOUS; SUBCUTANEOUS
Status: DISCONTINUED | OUTPATIENT
Start: 2017-09-16 | End: 2017-09-17 | Stop reason: HOSPADM

## 2017-09-16 RX ORDER — IBUPROFEN 200 MG
16 TABLET ORAL
Status: DISCONTINUED | OUTPATIENT
Start: 2017-09-16 | End: 2017-09-16

## 2017-09-16 RX ORDER — HYDROMORPHONE HYDROCHLORIDE 1 MG/ML
1 INJECTION, SOLUTION INTRAMUSCULAR; INTRAVENOUS; SUBCUTANEOUS
Status: COMPLETED | OUTPATIENT
Start: 2017-09-16 | End: 2017-09-16

## 2017-09-16 RX ORDER — SODIUM CHLORIDE 9 MG/ML
INJECTION, SOLUTION INTRAVENOUS CONTINUOUS
Status: DISCONTINUED | OUTPATIENT
Start: 2017-09-16 | End: 2017-09-17 | Stop reason: HOSPADM

## 2017-09-16 RX ORDER — IBUPROFEN 200 MG
16 TABLET ORAL
Status: DISCONTINUED | OUTPATIENT
Start: 2017-09-16 | End: 2017-09-17 | Stop reason: HOSPADM

## 2017-09-16 RX ORDER — GLUCAGON 1 MG
1 KIT INJECTION
Status: DISCONTINUED | OUTPATIENT
Start: 2017-09-16 | End: 2017-09-17 | Stop reason: HOSPADM

## 2017-09-16 RX ORDER — GLUCAGON 1 MG
1 KIT INJECTION
Status: DISCONTINUED | OUTPATIENT
Start: 2017-09-16 | End: 2017-09-16

## 2017-09-16 RX ORDER — OXYCODONE AND ACETAMINOPHEN 5; 325 MG/1; MG/1
1 TABLET ORAL EVERY 4 HOURS PRN
Status: DISCONTINUED | OUTPATIENT
Start: 2017-09-16 | End: 2017-09-16

## 2017-09-16 RX ORDER — IBUPROFEN 200 MG
24 TABLET ORAL
Status: DISCONTINUED | OUTPATIENT
Start: 2017-09-16 | End: 2017-09-17 | Stop reason: HOSPADM

## 2017-09-16 RX ORDER — SIMETHICONE 80 MG
1 TABLET,CHEWABLE ORAL 3 TIMES DAILY PRN
Status: DISCONTINUED | OUTPATIENT
Start: 2017-09-16 | End: 2017-09-17 | Stop reason: HOSPADM

## 2017-09-16 RX ADMIN — OXYCODONE HYDROCHLORIDE AND ACETAMINOPHEN 1 TABLET: 10; 325 TABLET ORAL at 04:09

## 2017-09-16 RX ADMIN — Medication 3 ML: at 09:09

## 2017-09-16 RX ADMIN — INSULIN DETEMIR 15 UNITS: 100 INJECTION, SOLUTION SUBCUTANEOUS at 02:09

## 2017-09-16 RX ADMIN — SIMETHICONE CHEW TAB 80 MG 80 MG: 80 TABLET ORAL at 09:09

## 2017-09-16 RX ADMIN — SIMETHICONE CHEW TAB 80 MG 80 MG: 80 TABLET ORAL at 06:09

## 2017-09-16 RX ADMIN — CEFTRIAXONE 2 G: 2 INJECTION, SOLUTION INTRAVENOUS at 01:09

## 2017-09-16 RX ADMIN — METOCLOPRAMIDE 5 MG: 5 INJECTION, SOLUTION INTRAMUSCULAR; INTRAVENOUS at 09:09

## 2017-09-16 RX ADMIN — HEPARIN SODIUM 5000 UNITS: 5000 INJECTION, SOLUTION INTRAVENOUS; SUBCUTANEOUS at 02:09

## 2017-09-16 RX ADMIN — SODIUM CHLORIDE: 0.9 INJECTION, SOLUTION INTRAVENOUS at 01:09

## 2017-09-16 RX ADMIN — SODIUM CHLORIDE 1000 ML: 0.9 INJECTION, SOLUTION INTRAVENOUS at 11:09

## 2017-09-16 RX ADMIN — BUPROPION HYDROCHLORIDE 300 MG: 150 TABLET, FILM COATED, EXTENDED RELEASE ORAL at 02:09

## 2017-09-16 RX ADMIN — HYDROMORPHONE HYDROCHLORIDE 1 MG: 1 INJECTION, SOLUTION INTRAMUSCULAR; INTRAVENOUS; SUBCUTANEOUS at 09:09

## 2017-09-16 RX ADMIN — OXYCODONE HYDROCHLORIDE 10 MG: 5 TABLET ORAL at 09:09

## 2017-09-16 RX ADMIN — ACETAMINOPHEN 1000 MG: 10 INJECTION, SOLUTION INTRAVENOUS at 09:09

## 2017-09-16 RX ADMIN — Medication 3 ML: at 02:09

## 2017-09-16 RX ADMIN — INSULIN ASPART 4 UNITS: 100 INJECTION, SOLUTION INTRAVENOUS; SUBCUTANEOUS at 03:09

## 2017-09-16 NOTE — ED NOTES
LOC: The patient is awake, alert and aware of environment, pt is in severe pain, the patient is oriented x 3 and speaking appropriately.  APPEARANCE: Patient is in severe pain and in acute distress, patient is clean and well groomed, patient's clothing is properly fastened.  SKIN: The skin is warm, pt is diaphoretic, patient has normal skin turgor and moist mucus membranes, skin intact, bruising noted to laparoscopy sites on abdomen.  MUSKULOSKELETAL: Patient moving all extremities well, no obvious swelling or deformities noted.  RESPIRATORY: Airway is open and patent, respirations are spontaneous, patient has a normal effort and rate. Breath sounds are clear and equal bilaterally.  CARDIAC: Normal heart sounds. No peripheral edema.  ABDOMEN: Unable to palpate due to pt being in severe pain in the suprapubic area,  distention noted. Pt states he was passing blood clots in his urine yesterday after Mcdaniels catheter removal. Pt states he has not urinated since last night and that he has been awake all night with severe suprapubic pain. Pt now states pain is radiating to his right flank.   NEURO: No neuro deficits, hand grasp equal, no drift noted, no facial droop noted. Speech is clear.

## 2017-09-16 NOTE — ASSESSMENT & PLAN NOTE
56 year old male s/p RALP 9/8/17 with posterior vesicourethral anastomotic defect with urine leak, urinoma, and urinary retention.    - 18 F coude catheter placed without difficulty with drainage of approximately 300 cc and improvement of symptoms. Confirmed in proper position with CT cystogram  - CT cystogram consistent with urine leak and urinoma  - AVIS with Cr elevated to 2.5 from 0.8. Likely mostly due to intraperitoneal absorption of urine, but may also be component of AVIS  - Place in observation on Urology service  - Leukocytosis - Cath UA and urine culture  - Empiric abx - rocephin for empiric treatment of UTI  - MIVF NaCl  - Maintain garrett catheter - will ensure catheter continues to drain well  - Repeat BMP PM  - Possible discharge home tomorrow if renal function improves as expected

## 2017-09-16 NOTE — HPI
Mr. Rios is a 57 yo male with a history of prostate cancer s/p RALP on 9/8/17. His garrett catheter was removed yesterday 9/15. Patient voided after garrett removal without difficulty, but had increasing difficulty voiding. At the same time, patient had worsening lower abdominal pain, fecal urgency, nausea. His last adequate void was yesterday - he has had some very small volume voids today, that don't relieve his symptoms. He denies fevers, chills, vomiting.    He has been having BMs since he was discharged. Last BM yesterday.

## 2017-09-16 NOTE — ED PROVIDER NOTES
Encounter Date: 9/16/2017    SCRIBE #1 NOTE: I, Caridad aBrrientos, am scribing for, and in the presence of,  Dr. Zhang. I have scribed the following portions of the note - the Resident attestation.       History     Chief Complaint   Patient presents with    Male  Problem     had prostate surg last friday, cath out yest, now unable to urinate    Post-op Problem     56 year old male with PMH significant DM II, HTN, and prostate CA s/p recent robotic laparoscopic prostatectomy on 9/8/2017 with Dr. Burns presents with urinary retention, bladder spasms, and urinating blood clots since having his garrett removed yesterday in Urology clinic. Patient reports that his surgery was uncomplicated and his garrett removal yesterday was uncomplicated. Patient was able to urinate without difficulty until approximately 2200. Patient does endorse of constipation since the onset of his symptoms. Patient denies of any fevers, chills, shortness of breath, or chest pain.             Review of patient's allergies indicates:   Allergen Reactions    Phenergan [promethazine] Anaphylaxis and Edema     Throat/mouth swelling    Zofran [ondansetron hcl (pf)] Anaphylaxis, Hives and Edema     Throat swelling & mouth    Metformin Nausea And Vomiting     Past Medical History:   Diagnosis Date    ADD (attention deficit disorder)     Anxiety     Arthritis     Diabetes mellitus, type 2     Dyslexia     Hypertension     preventative     BLAIRE on CPAP     Prostate cancer 8/24/2017     Past Surgical History:   Procedure Laterality Date    BACK SURGERY      HEEL SPUR SURGERY      NECK SURGERY       Family History   Problem Relation Age of Onset    Pulmonary fibrosis Mother     Diabetes Father     Atrial fibrillation Brother      Social History   Substance Use Topics    Smoking status: Never Smoker    Smokeless tobacco: Never Used    Alcohol use No     Review of Systems   Constitutional: Positive for activity change and diaphoresis.  Negative for chills and fever.   HENT: Negative for sore throat.    Eyes: Negative for visual disturbance.   Respiratory: Negative for shortness of breath.    Cardiovascular: Negative for chest pain.   Gastrointestinal: Positive for constipation.   Endocrine: Negative for polyuria.   Genitourinary: Positive for difficulty urinating, hematuria and urgency.   Musculoskeletal: Negative for gait problem.   Skin: Negative for wound.   Neurological: Negative for dizziness and syncope.   Psychiatric/Behavioral: Negative for confusion.       Physical Exam     Initial Vitals [09/16/17 0828]   BP Pulse Resp Temp SpO2   (!) 150/79 100 20 98.7 °F (37.1 °C) 97 %      MAP       102.67         Physical Exam    Constitutional: He appears well-developed and well-nourished. He is diaphoretic. He appears distressed.   HENT:   Head: Normocephalic and atraumatic.   Right Ear: External ear normal.   Left Ear: External ear normal.   Nose: Nose normal.   Mouth/Throat: Oropharynx is clear and moist. No oropharyngeal exudate.   Eyes: Conjunctivae and EOM are normal. Pupils are equal, round, and reactive to light. Right eye exhibits no discharge. Left eye exhibits no discharge. No scleral icterus.   Neck: Normal range of motion. Neck supple. No thyromegaly present. No tracheal deviation present.   Cardiovascular: Regular rhythm, normal heart sounds and intact distal pulses. Tachycardia present.  Exam reveals no gallop and no friction rub.    No murmur heard.  Pulmonary/Chest: Breath sounds normal. No stridor. No respiratory distress. He has no wheezes. He has no rhonchi. He has no rales. He exhibits no tenderness.   Abdominal: Soft. Bowel sounds are normal. He exhibits no distension and no mass. There is no tenderness. There is no rebound and no guarding.   Musculoskeletal: Normal range of motion. He exhibits no edema or tenderness.   Neurological: He is alert and oriented to person, place, and time. He has normal strength.   Skin: Skin is  warm. No rash noted. No erythema.   Psychiatric: He has a normal mood and affect. His behavior is normal. Judgment and thought content normal.         ED Course   Procedures  Labs Reviewed   CBC W/ AUTO DIFFERENTIAL - Abnormal; Notable for the following:        Result Value    WBC 14.58 (*)     MCV 79 (*)     RDW 14.6 (*)     MPV 8.7 (*)     Gran # 12.3 (*)     Lymph # 0.9 (*)     Gran% 84.7 (*)     Lymph% 6.4 (*)     All other components within normal limits   BASIC METABOLIC PANEL - Abnormal; Notable for the following:     Sodium 131 (*)     Chloride 94 (*)     CO2 22 (*)     Glucose 254 (*)     BUN, Bld 38 (*)     Creatinine 2.5 (*)     eGFR if  32.0 (*)     eGFR if non  27.7 (*)     All other components within normal limits   BASIC METABOLIC PANEL   POCT GLUCOSE MONITORING CONTINUOUS   POCT GLUCOSE MONITORING CONTINUOUS             Medical Decision Making:   History:   Old Medical Records: I decided to obtain old medical records.  Clinical Tests:   Lab Tests: Ordered and Reviewed  Radiological Study: Ordered and Reviewed  Other:   I have discussed this case with another health care provider.       APC / Resident Notes:   9:11 AM 56 year old male with PMH significant DM II, HTN, and prostate CA s/p recent robotic laparoscopic prostatectomy on 9/8/2017 with Dr. Burns presents with urinary retention, bladder spasms, and urinating blood clots since having his garrett removed yesterday in Urology clinic. Contacted Urology team, Urology to remove garrett catheter in the ED. Administered 1 mg of IV dilaudid for pain control in the meantime.     Fito Tsang MD  PGY-4    9:28 AM Ordered for additional 1 mg of IV dilaudid for pain and 5 mg of reglan for nausea.     Fito Tsang MD  PGY-4    10:01 AM Urology resident placed garrett in the ED. Urology to confirm positioning with CT scan and cystogram.     Fito Tsang MD  PGY-4    11:31 AM Patient's laboratory work-up significant for  AVIS. Will admit the patient to Hospital Medicine as an inpatient. Urology notified and will follow the patient.     Fito Tsang MD  PGY-4        Scribe Attestation:   Scribe #1: I performed the above scribed service and the documentation accurately describes the services I performed. I attest to the accuracy of the note.    Attending Attestation:   Physician Attestation Statement for Resident:  As the supervising MD   Physician Attestation Statement: I have personally seen and examined this patient.   I agree with the above history. -: 56 year old male presents post op day 8 from robotic prostatectomy presenting with urinary retention. Pt had Mcdaniels removed in clinic yesterday and since that time has had bladder spasms, passing blood clots and unable to void. Urology placed Mcdaniels in ED with relief of sx. Electrolytes revealed an AVIS with creatinine of 2.5, baseline 1.0. CBC with mild leukocytosis, which has improved, of 14.6. CT obtained per urology recommendation. Will admit to medicine for AVIS.     Reassessment: Small leak on CT.  This being the case, urology elects to admit patient to obs as they feel that AVIS is likely resorptive.   As the supervising MD I agree with the above PE.    As the supervising MD I agree with the above treatment, course, plan, and disposition.          Physician Attestation for Scribe:  Physician Attestation Statement for Scribe #1: I, Dr. Zhang, reviewed documentation, as scribed by Caridad Barrientos in my presence, and it is both accurate and complete.                 ED Course      Clinical Impression:   The primary encounter diagnosis was Postoperative urinary retention. Diagnoses of S/P prostatectomy and AVIS (acute kidney injury) were also pertinent to this visit.    Disposition:   Disposition: Admitted  Condition: Radha Zhang MD  09/16/17 6975

## 2017-09-16 NOTE — CONSULTS
[facility]  Urology  Consult Note    Patient Name: Rickey Rios  MRN: [id number]  Admission Date: 9/16/2017  Hospital Length of Stay: 1   Code Status: Full Code   Attending Provider: Maggy Bill MD  Consulting Provider: Montana Lund MD  Primary Care Physician: Min Kaur MD  Principal Problem:Postoperative urinary retention    Inpatient consult to Urology  Consult performed by: MONTANA LUND  Consult ordered by: FREDDY ARROYO          Subjective:     HPI:  Mr. Rios is a 57 yo male with a history of prostate cancer s/p RALP on 9/8/17. His garrett catheter was removed yesterday 9/15. Patient voided after garrett removal without difficulty, but had increasing difficulty voiding. At the same time, patient had worsening lower abdominal pain, fecal urgency, nausea. His last adequate void was yesterday - he has had some very small volume voids today, that don't relieve his symptoms. He denies fevers, chills, vomiting.    He has been having BMs since he was discharged. Last BM yesterday.    Past Medical History:   Diagnosis Date    ADD (attention deficit disorder)     Anxiety     Arthritis     Diabetes mellitus, type 2     Dyslexia     Hypertension     preventative     BLAIRE on CPAP     Prostate cancer 8/24/2017       Past Surgical History:   Procedure Laterality Date    BACK SURGERY      HEEL SPUR SURGERY      NECK SURGERY         Review of patient's allergies indicates:   Allergen Reactions    Phenergan [promethazine] Anaphylaxis and Edema     Throat/mouth swelling    Zofran [ondansetron hcl (pf)] Anaphylaxis, Hives and Edema     Throat swelling & mouth    Metformin Nausea And Vomiting       Family History     Problem Relation (Age of Onset)    Atrial fibrillation Brother    Diabetes Father    Pulmonary fibrosis Mother          Social History Main Topics    Smoking status: Never Smoker    Smokeless tobacco: Never Used    Alcohol use No    Drug use: No    Sexual  activity: Yes     Partners: Female       Review of Systems   Constitutional: Negative for chills, fatigue and fever.   HENT: Negative for congestion and trouble swallowing.    Eyes: Negative for visual disturbance.   Respiratory: Negative for shortness of breath.    Cardiovascular: Negative for chest pain and palpitations.   Gastrointestinal: Positive for abdominal distention, abdominal pain, constipation and nausea. Negative for vomiting.   Genitourinary: Positive for difficulty urinating. Negative for dysuria, flank pain and hematuria.   Musculoskeletal: Negative for arthralgias and myalgias.   Skin: Negative for rash.   Neurological: Negative for dizziness, weakness and light-headedness.   Hematological: Does not bruise/bleed easily.   Psychiatric/Behavioral: Negative for agitation and confusion.       Objective:     Temp:  [98.7 °F (37.1 °C)] 98.7 °F (37.1 °C)  Pulse:  [] 97  Resp:  [16-20] 16  SpO2:  [89 %-97 %] 89 %  BP: (150-176)/(79-89) 163/81     Body mass index is 31.46 kg/m².      Date 09/16/17 0700 - 09/17/17 0659   Shift 9464-1019 8605-7832 7051-7908 24 Hour Total   I  N  T  A  K  E   Shift Total  (mL/kg)       O  U  T  P  U  T   Urine  (mL/kg/hr) 550   550    Shift Total  (mL/kg) 550  (4.9)   550  (4.9)   Weight (kg) 111.1 111.1 111.1 111.1          Drains     Drain                 Closed/Suction Drain 09/08/17 1111 Left Abdomen Bulb 15 Fr. 8 days         Urethral Catheter 09/08/17 0757 Non-latex;Straight-tip 16 Fr. 8 days         Urethral Catheter 09/16/17 1000 Coude 18 Fr. less than 1 day                Physical Exam   Constitutional: He is oriented to person, place, and time. He appears well-developed and well-nourished. No distress.   HENT:   Head: Normocephalic and atraumatic.   Eyes: Pupils are equal, round, and reactive to light.   Neck: Normal range of motion. Neck supple.   Cardiovascular: Normal rate.    Pulmonary/Chest: Effort normal. No respiratory distress.   Abdominal: Soft. He  exhibits distension (lower abdominal distension). There is tenderness (lower abdominal TTP, no peritoneal signs). There is no rebound and no guarding.   Incisions c/d/i   Genitourinary: Penis normal.   Musculoskeletal: Normal range of motion.   Neurological: He is alert and oriented to person, place, and time.   Skin: Skin is warm and dry.         Significant Labs:    BMP:    Recent Labs  Lab 09/16/17  1014   *   K 5.0   CL 94*   CO2 22*   BUN 38*   CREATININE 2.5*   CALCIUM 10.2       CBC:    Recent Labs  Lab 09/16/17  1014   WBC 14.58*   HGB 14.4   HCT 40.0          Urine Culture: No results for input(s): LABURIN in the last 168 hours.  Urine Studies: No results for input(s): COLORU, APPEARANCEUA, PHUR, SPECGRAV, PROTEINUA, GLUCUA, KETONESU, BILIRUBINUA, OCCULTUA, NITRITE, UROBILINOGEN, LEUKOCYTESUR, RBCUA, WBCUA, BACTERIA, SQUAMEPITHEL, HYALINECASTS in the last 168 hours.    Invalid input(s): WRIGHTSUR  All pertinent labs results from the past 24 hours have been reviewed.    Significant Imaging:  CT: I have reviewed all results within the past 24 hours and my personal findings are:  Mcdaniels catheter in proper position in bladder. Cystogram demonstrates small defect at posterior vesicourethral anastomosis with extravasation consistent with urine leak. There is collection of urine intraperitoneally as well as retrovesically.    Assessment and Plan:     * Postoperative urinary retention    See plan for urinoma        S/P prostatectomy    See plan for urinoma        Paravesical urinoma    56 year old male s/p RALP 9/8/17 with posterior vesicourethral anastomotic defect with urine leak, urinoma, and urinary retention.    - 18 F coude catheter placed without difficulty with drainage of approximately 300 cc and improvement of symptoms. Confirmed in proper position with CT cystogram  - CT cystogram consistent with urine leak and urinoma  - AVIS with Cr elevated to 2.5 from 0.8. Likely mostly due to  intraperitoneal absorption of urine, but may also be component of AVIS  - Place in observation on Urology service  - Leukocytosis - Cath UA and urine culture  - Empiric abx - rocephin for empiric treatment of UTI  - MIVF NaCl  - Maintain garrett catheter - will ensure catheter continues to drain well  - Repeat BMP PM  - Possible discharge home tomorrow if renal function improves as expected        AVIS (acute kidney injury)    See plan for urinoma            VTE Risk Mitigation         Ordered     heparin (porcine) injection 5,000 Units  Every 8 hours     Route:  Subcutaneous        09/16/17 1249     High Risk of VTE  Once      09/16/17 1253     Place sequential compression device  Until discontinued      09/16/17 1253     Place BALBIR hose  Until discontinued      09/16/17 1253     Medium Risk of VTE  Once      09/16/17 1253     Place sequential compression device  Until discontinued      09/16/17 1253     Place BALBIR hose  Until discontinued      09/16/17 1253     Place BALBIR hose  Until discontinued      09/16/17 1249     Place sequential compression device  Until discontinued      09/16/17 1249          Thank you for your consult. I will follow-up with patient. Please contact us if you have any additional questions.    Montana Lopez MD  Urology  [facility]    Agree with the above.  Patient was seen the following morning, please see that note for comments.

## 2017-09-16 NOTE — SUBJECTIVE & OBJECTIVE
Past Medical History:   Diagnosis Date    ADD (attention deficit disorder)     Anxiety     Arthritis     Diabetes mellitus, type 2     Dyslexia     Hypertension     preventative     BLAIRE on CPAP     Prostate cancer 8/24/2017       Past Surgical History:   Procedure Laterality Date    BACK SURGERY      HEEL SPUR SURGERY      NECK SURGERY         Review of patient's allergies indicates:   Allergen Reactions    Phenergan [promethazine] Anaphylaxis and Edema     Throat/mouth swelling    Zofran [ondansetron hcl (pf)] Anaphylaxis, Hives and Edema     Throat swelling & mouth    Metformin Nausea And Vomiting       Family History     Problem Relation (Age of Onset)    Atrial fibrillation Brother    Diabetes Father    Pulmonary fibrosis Mother          Social History Main Topics    Smoking status: Never Smoker    Smokeless tobacco: Never Used    Alcohol use No    Drug use: No    Sexual activity: Yes     Partners: Female       Review of Systems   Constitutional: Negative for chills, fatigue and fever.   HENT: Negative for congestion and trouble swallowing.    Eyes: Negative for visual disturbance.   Respiratory: Negative for shortness of breath.    Cardiovascular: Negative for chest pain and palpitations.   Gastrointestinal: Positive for abdominal distention, abdominal pain, constipation and nausea. Negative for vomiting.   Genitourinary: Positive for difficulty urinating. Negative for dysuria, flank pain and hematuria.   Musculoskeletal: Negative for arthralgias and myalgias.   Skin: Negative for rash.   Neurological: Negative for dizziness, weakness and light-headedness.   Hematological: Does not bruise/bleed easily.   Psychiatric/Behavioral: Negative for agitation and confusion.       Objective:     Temp:  [98.7 °F (37.1 °C)] 98.7 °F (37.1 °C)  Pulse:  [] 97  Resp:  [16-20] 16  SpO2:  [89 %-97 %] 89 %  BP: (150-176)/(79-89) 163/81     Body mass index is 31.46 kg/m².      Date 09/16/17 0700 - 09/17/17  0659   Shift 2526-6079 9267-2105 7896-1690 24 Hour Total   I  N  T  A  K  E   Shift Total  (mL/kg)       O  U  T  P  U  T   Urine  (mL/kg/hr) 550   550    Shift Total  (mL/kg) 550  (4.9)   550  (4.9)   Weight (kg) 111.1 111.1 111.1 111.1          Drains     Drain                 Closed/Suction Drain 09/08/17 1111 Left Abdomen Bulb 15 Fr. 8 days         Urethral Catheter 09/08/17 0757 Non-latex;Straight-tip 16 Fr. 8 days         Urethral Catheter 09/16/17 1000 Coude 18 Fr. less than 1 day                Physical Exam   Constitutional: He is oriented to person, place, and time. He appears well-developed and well-nourished. No distress.   HENT:   Head: Normocephalic and atraumatic.   Eyes: Pupils are equal, round, and reactive to light.   Neck: Normal range of motion. Neck supple.   Cardiovascular: Normal rate.    Pulmonary/Chest: Effort normal. No respiratory distress.   Abdominal: Soft. He exhibits distension (lower abdominal distension). There is tenderness (lower abdominal TTP, no peritoneal signs). There is no rebound and no guarding.   Incisions c/d/i   Genitourinary: Penis normal.   Musculoskeletal: Normal range of motion.   Neurological: He is alert and oriented to person, place, and time.   Skin: Skin is warm and dry.         Significant Labs:    BMP:    Recent Labs  Lab 09/16/17  1014   *   K 5.0   CL 94*   CO2 22*   BUN 38*   CREATININE 2.5*   CALCIUM 10.2       CBC:    Recent Labs  Lab 09/16/17  1014   WBC 14.58*   HGB 14.4   HCT 40.0          Urine Culture: No results for input(s): LABURIN in the last 168 hours.  Urine Studies: No results for input(s): COLORU, APPEARANCEUA, PHUR, SPECGRAV, PROTEINUA, GLUCUA, KETONESU, BILIRUBINUA, OCCULTUA, NITRITE, UROBILINOGEN, LEUKOCYTESUR, RBCUA, WBCUA, BACTERIA, SQUAMEPITHEL, HYALINECASTS in the last 168 hours.    Invalid input(s): WRIGHTSUR  All pertinent labs results from the past 24 hours have been reviewed.    Significant Imaging:  CT: I have reviewed  all results within the past 24 hours and my personal findings are:  Mcdaniels catheter in proper position in bladder. Cystogram demonstrates small defect at posterior vesicourethral anastomosis with extravasation consistent with urine leak. There is collection of urine intraperitoneally as well as retrovesically.

## 2017-09-16 NOTE — ED TRIAGE NOTES
Pt had prostate surgery on 9/8/17. Pt was doing well until last night. Pt began having suprapubic pressure and spasms. Pt  States he was urinating small blood clots all day yesterday.

## 2017-09-17 VITALS
HEART RATE: 96 BPM | RESPIRATION RATE: 16 BRPM | SYSTOLIC BLOOD PRESSURE: 141 MMHG | TEMPERATURE: 97 F | BODY MASS INDEX: 31.44 KG/M2 | OXYGEN SATURATION: 96 % | DIASTOLIC BLOOD PRESSURE: 67 MMHG | HEIGHT: 74 IN | WEIGHT: 245 LBS

## 2017-09-17 DIAGNOSIS — N32.89: Primary | ICD-10-CM

## 2017-09-17 LAB
ALBUMIN SERPL BCP-MCNC: 3 G/DL
ALP SERPL-CCNC: 104 U/L
ALT SERPL W/O P-5'-P-CCNC: 30 U/L
ANION GAP SERPL CALC-SCNC: 8 MMOL/L
ANION GAP SERPL CALC-SCNC: 8 MMOL/L
AST SERPL-CCNC: 22 U/L
BASOPHILS # BLD AUTO: 0.04 K/UL
BASOPHILS # BLD AUTO: 0.04 K/UL
BASOPHILS NFR BLD: 0.3 %
BASOPHILS NFR BLD: 0.3 %
BILIRUB SERPL-MCNC: 0.8 MG/DL
BUN SERPL-MCNC: 25 MG/DL
BUN SERPL-MCNC: 25 MG/DL
CALCIUM SERPL-MCNC: 9.4 MG/DL
CALCIUM SERPL-MCNC: 9.4 MG/DL
CHLORIDE SERPL-SCNC: 97 MMOL/L
CHLORIDE SERPL-SCNC: 97 MMOL/L
CO2 SERPL-SCNC: 26 MMOL/L
CO2 SERPL-SCNC: 26 MMOL/L
CREAT SERPL-MCNC: 1.2 MG/DL
CREAT SERPL-MCNC: 1.2 MG/DL
DIFFERENTIAL METHOD: ABNORMAL
DIFFERENTIAL METHOD: ABNORMAL
EOSINOPHIL # BLD AUTO: 0.3 K/UL
EOSINOPHIL # BLD AUTO: 0.3 K/UL
EOSINOPHIL NFR BLD: 2.3 %
EOSINOPHIL NFR BLD: 2.3 %
ERYTHROCYTE [DISTWIDTH] IN BLOOD BY AUTOMATED COUNT: 15.3 %
ERYTHROCYTE [DISTWIDTH] IN BLOOD BY AUTOMATED COUNT: 15.3 %
EST. GFR  (AFRICAN AMERICAN): >60 ML/MIN/1.73 M^2
EST. GFR  (AFRICAN AMERICAN): >60 ML/MIN/1.73 M^2
EST. GFR  (NON AFRICAN AMERICAN): >60 ML/MIN/1.73 M^2
EST. GFR  (NON AFRICAN AMERICAN): >60 ML/MIN/1.73 M^2
GLUCOSE SERPL-MCNC: 142 MG/DL
GLUCOSE SERPL-MCNC: 142 MG/DL
HCT VFR BLD AUTO: 35.7 %
HCT VFR BLD AUTO: 35.7 %
HGB BLD-MCNC: 12.5 G/DL
HGB BLD-MCNC: 12.5 G/DL
LYMPHOCYTES # BLD AUTO: 1.8 K/UL
LYMPHOCYTES # BLD AUTO: 1.8 K/UL
LYMPHOCYTES NFR BLD: 14.9 %
LYMPHOCYTES NFR BLD: 14.9 %
MAGNESIUM SERPL-MCNC: 2.1 MG/DL
MCH RBC QN AUTO: 28.3 PG
MCH RBC QN AUTO: 28.3 PG
MCHC RBC AUTO-ENTMCNC: 35 G/DL
MCHC RBC AUTO-ENTMCNC: 35 G/DL
MCV RBC AUTO: 81 FL
MCV RBC AUTO: 81 FL
MONOCYTES # BLD AUTO: 1.5 K/UL
MONOCYTES # BLD AUTO: 1.5 K/UL
MONOCYTES NFR BLD: 12.1 %
MONOCYTES NFR BLD: 12.1 %
NEUTROPHILS # BLD AUTO: 8.3 K/UL
NEUTROPHILS # BLD AUTO: 8.3 K/UL
NEUTROPHILS NFR BLD: 69.4 %
NEUTROPHILS NFR BLD: 69.4 %
PHOSPHATE SERPL-MCNC: 2.5 MG/DL
PLATELET # BLD AUTO: 300 K/UL
PLATELET # BLD AUTO: 300 K/UL
PMV BLD AUTO: 8.4 FL
PMV BLD AUTO: 8.4 FL
POCT GLUCOSE: 135 MG/DL (ref 70–110)
POTASSIUM SERPL-SCNC: 4.2 MMOL/L
POTASSIUM SERPL-SCNC: 4.2 MMOL/L
PROT SERPL-MCNC: 6.6 G/DL
RBC # BLD AUTO: 4.42 M/UL
RBC # BLD AUTO: 4.42 M/UL
SODIUM SERPL-SCNC: 131 MMOL/L
SODIUM SERPL-SCNC: 131 MMOL/L
WBC # BLD AUTO: 11.96 K/UL
WBC # BLD AUTO: 11.96 K/UL

## 2017-09-17 PROCEDURE — 80053 COMPREHEN METABOLIC PANEL: CPT

## 2017-09-17 PROCEDURE — 36415 COLL VENOUS BLD VENIPUNCTURE: CPT

## 2017-09-17 PROCEDURE — 84100 ASSAY OF PHOSPHORUS: CPT

## 2017-09-17 PROCEDURE — 25000003 PHARM REV CODE 250: Performed by: STUDENT IN AN ORGANIZED HEALTH CARE EDUCATION/TRAINING PROGRAM

## 2017-09-17 PROCEDURE — 85025 COMPLETE CBC W/AUTO DIFF WBC: CPT

## 2017-09-17 PROCEDURE — A4216 STERILE WATER/SALINE, 10 ML: HCPCS | Performed by: STUDENT IN AN ORGANIZED HEALTH CARE EDUCATION/TRAINING PROGRAM

## 2017-09-17 PROCEDURE — 99024 POSTOP FOLLOW-UP VISIT: CPT | Mod: ,,, | Performed by: UROLOGY

## 2017-09-17 PROCEDURE — 63600175 PHARM REV CODE 636 W HCPCS: Performed by: STUDENT IN AN ORGANIZED HEALTH CARE EDUCATION/TRAINING PROGRAM

## 2017-09-17 PROCEDURE — G0378 HOSPITAL OBSERVATION PER HR: HCPCS

## 2017-09-17 PROCEDURE — 83735 ASSAY OF MAGNESIUM: CPT

## 2017-09-17 RX ORDER — OXYCODONE AND ACETAMINOPHEN 5; 325 MG/1; MG/1
1 TABLET ORAL EVERY 4 HOURS PRN
Qty: 20 TABLET | Refills: 0 | Status: SHIPPED | OUTPATIENT
Start: 2017-09-17 | End: 2017-10-05

## 2017-09-17 RX ORDER — AMOXICILLIN AND CLAVULANATE POTASSIUM 500; 125 MG/1; MG/1
1 TABLET, FILM COATED ORAL 2 TIMES DAILY
Qty: 14 TABLET | Refills: 0 | Status: SHIPPED | OUTPATIENT
Start: 2017-09-17 | End: 2017-09-17 | Stop reason: HOSPADM

## 2017-09-17 RX ORDER — CEPHALEXIN 500 MG/1
500 CAPSULE ORAL EVERY 6 HOURS
Qty: 28 CAPSULE | Refills: 0 | Status: SHIPPED | OUTPATIENT
Start: 2017-09-17 | End: 2017-09-24

## 2017-09-17 RX ORDER — SIMETHICONE 80 MG
80 TABLET,CHEWABLE ORAL 3 TIMES DAILY PRN
Qty: 30 TABLET | Refills: 2 | Status: SHIPPED | OUTPATIENT
Start: 2017-09-17 | End: 2018-12-05

## 2017-09-17 RX ADMIN — OXYCODONE HYDROCHLORIDE 10 MG: 5 TABLET ORAL at 12:09

## 2017-09-17 RX ADMIN — BUPROPION HYDROCHLORIDE 300 MG: 150 TABLET, FILM COATED, EXTENDED RELEASE ORAL at 08:09

## 2017-09-17 RX ADMIN — OXYCODONE HYDROCHLORIDE 10 MG: 5 TABLET ORAL at 05:09

## 2017-09-17 RX ADMIN — SIMETHICONE CHEW TAB 80 MG 80 MG: 80 TABLET ORAL at 06:09

## 2017-09-17 RX ADMIN — Medication 3 ML: at 05:09

## 2017-09-17 RX ADMIN — SIMETHICONE CHEW TAB 80 MG 80 MG: 80 TABLET ORAL at 12:09

## 2017-09-17 RX ADMIN — INSULIN DETEMIR 18 UNITS: 100 INJECTION, SOLUTION SUBCUTANEOUS at 09:09

## 2017-09-17 RX ADMIN — OXYCODONE HYDROCHLORIDE 10 MG: 5 TABLET ORAL at 09:09

## 2017-09-17 RX ADMIN — ACETAMINOPHEN 1000 MG: 10 INJECTION, SOLUTION INTRAVENOUS at 05:09

## 2017-09-17 NOTE — DISCHARGE SUMMARY
[facility]  Urology  Discharge Summary      Patient Name: Rickey Rios  MRN: [id number]  Admission Date: 9/16/2017  Hospital Length of Stay: 1 days  Discharge Date and Time:  09/17/2017 10:10 AM  Attending Physician: Maggy Bill MD   Discharging Provider: Timur Short MD  Primary Care Physician: Min Kaur MD    HPI: Mr. Rios is a 55 yo male with a history of prostate cancer s/p RALP on 9/8/17. His garrett catheter was removed yesterday 9/15. Patient voided after garrett removal without difficulty, but had increasing difficulty voiding. At the same time, patient had worsening lower abdominal pain, fecal urgency, nausea. His last adequate void was yesterday - he has had some very small volume voids today, that don't relieve his symptoms. He denies fevers, chills, vomiting.    * No surgery found *     Indwelling Lines/Drains at time of discharge:   Lines/Drains/Airways     Drain                 Urethral Catheter 09/16/17 1000 Coude 18 Fr. 1 day                Hospital Course (synopsis of major diagnoses, care, treatment, and services provided during the course of the hospital stay): Patient admitted for evaluation and management of AVIS and suspected urinoma from anastomotic leak after robotic prostatectomy. CT cystogram positive for posterior leak and pelvic urinoma, garrett catheter was shown to be in good position in the urinary bladder. Patient was started on a regular diet, IVF and given oral pain medication and kept overnight. His Cr drastically improved over 24 hours and his pain was controlled with po medications. He was tolerating his diet and walking through the hallways. He will be discharged with his garrett catheter in place and will follow up with Dr. Burns.     Consults:   Consults         Status Ordering Provider     Inpatient consult to Urology  Once     Provider:  (Not yet assigned)    Completed FREDDY ARROYO          Significant Diagnostic Studies:     CT  "cystogram: positive for posterior anastomotic leak, urinoma in rectovesical space    Pending Diagnostic Studies:     None          Final Active Diagnoses:    Diagnosis Date Noted POA    PRINCIPAL PROBLEM:  Postoperative urinary retention [N99.89, R33.8] 09/16/2017 Yes    Ankylosing spondylitis [M45.9] 09/16/2017 Unknown    AVIS (acute kidney injury) [N17.9] 09/16/2017 Yes    Paravesical urinoma 09/16/2017 Yes    S/P prostatectomy [Z90.79] 09/16/2017 Not Applicable    Prostate cancer [C61] 08/24/2017 Yes    Type 2 diabetes mellitus without complication, with long-term current use of insulin [E11.9, Z79.4] 06/14/2017 Not Applicable      Problems Resolved During this Admission:    Diagnosis Date Noted Date Resolved POA       Discharged Condition: stable    Disposition: Home or Self Care    Follow Up:  Follow-up Information     Tariq Burns MD.    Specialty:  Urology  Contact information:  [address] [place] [address] [id number] [phone]                 Patient Instructions:     Diet general     Activity as tolerated     Call MD for:  persistent nausea and vomiting or diarrhea     Call MD for:  severe uncontrolled pain     Call MD for:   Order Comments: Temperature > 101F       Medications:  Reconciled Home Medications:   Current Discharge Medication List      START taking these medications    Details   cephALEXin (KEFLEX) 500 MG capsule Take 1 capsule (500 mg total) by mouth every 6 (six) hours.  Qty: 28 capsule, Refills: 0      simethicone (MYLICON) 80 MG chewable tablet Take 1 tablet (80 mg total) by mouth 3 (three) times daily as needed for Flatulence.  Qty: 30 tablet, Refills: 2         CONTINUE these medications which have CHANGED    Details   oxycodone-acetaminophen (PERCOCET) 5-325 mg per tablet Take 1 tablet by mouth every 4 (four) hours as needed.  Qty: 20 tablet, Refills: 0         CONTINUE these medications which have NOT CHANGED    Details   BD ULTRA-FINE BERNARDO PEN NEEDLES 32 gauge x 5/32" " Ndle       buPROPion (WELLBUTRIN XL) 300 MG 24 hr tablet       FREESTYLE LANCETS 28 gauge lancets       FREESTYLE LITE STRIPS Strp       glimepiride (AMARYL) 4 MG tablet       indomethacin (INDOCIN SR) 75 mg CpSR CR capsule       INVOKANA 300 mg Tab tablet       LEVEMIR FLEXTOUCH 100 unit/mL (3 mL) InPn pen 24 Units 2 (two) times daily.       lorazepam (ATIVAN) 0.5 MG tablet Take 0.5 mg by mouth every 6 (six) hours as needed for Anxiety.      polyethylene glycol (GLYCOLAX) 17 gram PwPk Take 17 g by mouth once daily.  Qty: 30 packet, Refills: 0      sildenafil (REVATIO) 20 mg Tab Take 1 tablet (20 mg total) by mouth continuous prn (Take 3 tablets, 1 hour prior to intercource, can take up to 5 tablets at once, but do not exceed 5 tablets.).  Qty: 30 tablet, Refills: 11    Associated Diagnoses: Benign prostatic hyperplasia, presence of lower urinary tract symptoms unspecified, unspecified morphology; Low testosterone      STRATTERA 80 mg capsule       TRULICITY 0.75 mg/0.5 mL PnIj       valsartan (DIOVAN) 160 MG tablet          STOP taking these medications       sulfamethoxazole-trimethoprim 800-160mg (BACTRIM DS) 800-160 mg Tab Comments:   Reason for Stopping:               Time spent on the discharge of patient: 15 minutes    Timur Short MD  Urology  [facility]    Patient was seen at the bedside.  At the time, he had abdominal spasm unclear if bladder or bowel.  However, it passed.  We discussed the creatinine decreased significantly, and was likely from the urinoma rather than it being AVIS.  CT cystogram revealed the leak posteriorly with catheter in proper placement.  Follow up with Dr. Burns.

## 2017-09-18 ENCOUNTER — TELEPHONE (OUTPATIENT)
Dept: UROLOGY | Facility: CLINIC | Age: 57
End: 2017-09-18

## 2017-09-18 LAB — BACTERIA UR CULT: NO GROWTH

## 2017-09-18 NOTE — TELEPHONE ENCOUNTER
----- Message from Timur Short MD sent at 9/17/2017  2:53 PM CDT -----  Nurse Charles,    Mr. Rios was re-admitted over the weekend after his garrett catheter was removed after prostatectomy and was found to have an anastomotic leak. We replaced the garrett catheter and sent him home the next day. He will need a 2 week follow up with Dr. Burns with a CT pelvis without contrast with cystogram. I have ordered the study for him.     Thank you,  Timur

## 2017-09-21 ENCOUNTER — TELEPHONE (OUTPATIENT)
Dept: UROLOGY | Facility: CLINIC | Age: 57
End: 2017-09-21

## 2017-09-21 NOTE — TELEPHONE ENCOUNTER
Patient went to the ER for pelvic pain/urine retention following RALP and had to be cystoscoped and garrett reinserted and was to come back in 2 weeks for ct/follow up for garrett removal. Patient stated he wants to come back on 9-28-17 or before because he wants it out before his birthday. I advised patient that I will send  a message to see if its ok or if he has to wait until 10-3-17 or 10-05-17.

## 2017-09-21 NOTE — TELEPHONE ENCOUNTER
----- Message from Carli Ortega sent at 9/21/2017 10:43 AM CDT -----  Contact: Pt:[phone]  Pt called and states he would like to speak with Nurse Soto. Pt did not want to disclose reason with me.

## 2017-09-25 DIAGNOSIS — C61 PROSTATE CANCER: Primary | ICD-10-CM

## 2017-09-26 ENCOUNTER — HOSPITAL ENCOUNTER (OUTPATIENT)
Dept: RADIOLOGY | Facility: HOSPITAL | Age: 57
Discharge: HOME | End: 2017-09-26
Attending: UROLOGY
Payer: COMMERCIAL

## 2017-09-26 ENCOUNTER — OFFICE VISIT (OUTPATIENT)
Dept: UROLOGY | Facility: CLINIC | Age: 57
End: 2017-09-26
Payer: COMMERCIAL

## 2017-09-26 VITALS
HEIGHT: 74 IN | HEART RATE: 92 BPM | RESPIRATION RATE: 16 BRPM | DIASTOLIC BLOOD PRESSURE: 62 MMHG | BODY MASS INDEX: 30.8 KG/M2 | WEIGHT: 240 LBS | SYSTOLIC BLOOD PRESSURE: 120 MMHG

## 2017-09-26 DIAGNOSIS — C61 PROSTATE CANCER: ICD-10-CM

## 2017-09-26 DIAGNOSIS — C61 PROSTATE CANCER: Primary | ICD-10-CM

## 2017-09-26 PROCEDURE — 99499 UNLISTED E&M SERVICE: CPT | Mod: S$GLB,,, | Performed by: UROLOGY

## 2017-09-26 PROCEDURE — 99999 PR PBB SHADOW E&M-EST. PATIENT-LVL III: CPT | Mod: PBBFAC,,, | Performed by: UROLOGY

## 2017-09-26 PROCEDURE — 74430 CONTRAST X-RAY BLADDER: CPT | Mod: TC

## 2017-09-26 PROCEDURE — 25500020 PHARM REV CODE 255: Performed by: UROLOGY

## 2017-09-26 PROCEDURE — 74430 CONTRAST X-RAY BLADDER: CPT | Mod: 26,,, | Performed by: RADIOLOGY

## 2017-09-26 RX ORDER — POLYETHYLENE GLYCOL 3350 17 G/17G
POWDER, FOR SOLUTION ORAL
COMMUNITY
Start: 2017-09-09 | End: 2019-12-23

## 2017-09-26 RX ORDER — INSULIN DEGLUDEC 200 U/ML
INJECTION, SOLUTION SUBCUTANEOUS
COMMUNITY
Start: 2017-09-25 | End: 2017-10-16

## 2017-09-26 RX ADMIN — IOTHALAMATE MEGLUMINE 250 ML: 172 INJECTION URETERAL at 01:09

## 2017-09-26 NOTE — PROGRESS NOTES
"Clinic Note  9/26/2017      Subjective:         Chief Complaint:   HPI  Rickey Rios is a 56 y.o. male s/p robotic assisted laparoscopic prostatectomy on 9/8/2017. Had retention and leak after garrett removal.  Cystogram today does not reveal a leak.      Lab Results   Component Value Date    PSADIAG 5.7 (H) 07/12/2017    PSADIAG 4.6 (H) 05/18/2017      Past Medical History:   Diagnosis Date    ADD (attention deficit disorder)     Anxiety     Arthritis     Diabetes mellitus, type 2     Dyslexia     Hypertension     preventative     BLAIRE on CPAP     Prostate cancer 8/24/2017     Family History   Problem Relation Age of Onset    Pulmonary fibrosis Mother     Diabetes Father     Atrial fibrillation Brother      Social History     Social History    Marital status:      Spouse name: N/A    Number of children: 4    Years of education: N/A     Occupational History    federal employee      Social History Main Topics    Smoking status: Never Smoker    Smokeless tobacco: Never Used    Alcohol use No    Drug use: No    Sexual activity: Yes     Partners: Female     Other Topics Concern    Not on file     Social History Narrative    No narrative on file     Past Surgical History:   Procedure Laterality Date    BACK SURGERY      HEEL SPUR SURGERY      NECK SURGERY       Patient Active Problem List   Diagnosis    Essential hypertension    Cervical disc disease    Lumbar disc disease    Type 2 diabetes mellitus without complication, with long-term current use of insulin    Prostate cancer    Postoperative urinary retention    Ankylosing spondylitis    AVIS (acute kidney injury)    Paravesical urinoma    S/P prostatectomy     Review of Systems      Objective:      There were no vitals taken for this visit.  Estimated body mass index is 31.46 kg/m² as calculated from the following:    Height as of 9/16/17: 6' 2" (1.88 m).    Weight as of 9/16/17: 111.1 kg (245 lb).  Physical Exam    "   Assessment and Plan:           Problem List Items Addressed This Visit     Prostate cancer - Primary      Other Visit Diagnoses    None.         Follow up:   Cystogram looks good. Voiding trial today.  Resume Rafita.    Tariq Burns

## 2017-10-05 ENCOUNTER — LAB VISIT (OUTPATIENT)
Dept: LAB | Facility: HOSPITAL | Age: 57
End: 2017-10-05
Attending: UROLOGY
Payer: COMMERCIAL

## 2017-10-05 ENCOUNTER — OFFICE VISIT (OUTPATIENT)
Dept: UROLOGY | Facility: CLINIC | Age: 57
End: 2017-10-05
Payer: COMMERCIAL

## 2017-10-05 VITALS
HEART RATE: 97 BPM | BODY MASS INDEX: 31.06 KG/M2 | SYSTOLIC BLOOD PRESSURE: 116 MMHG | HEIGHT: 74 IN | WEIGHT: 242 LBS | DIASTOLIC BLOOD PRESSURE: 62 MMHG

## 2017-10-05 VITALS
DIASTOLIC BLOOD PRESSURE: 62 MMHG | WEIGHT: 242.5 LBS | HEIGHT: 74 IN | SYSTOLIC BLOOD PRESSURE: 116 MMHG | BODY MASS INDEX: 31.12 KG/M2 | HEART RATE: 97 BPM

## 2017-10-05 DIAGNOSIS — N52.31 ERECTILE DYSFUNCTION FOLLOWING RADICAL PROSTATECTOMY: ICD-10-CM

## 2017-10-05 DIAGNOSIS — N52.31 ERECTILE DYSFUNCTION FOLLOWING RADICAL PROSTATECTOMY: Primary | ICD-10-CM

## 2017-10-05 DIAGNOSIS — E11.9 TYPE 2 DIABETES MELLITUS WITHOUT COMPLICATION, WITH LONG-TERM CURRENT USE OF INSULIN: ICD-10-CM

## 2017-10-05 DIAGNOSIS — N48.6 PEYRONIE'S DISEASE: ICD-10-CM

## 2017-10-05 DIAGNOSIS — Z90.79 S/P PROSTATECTOMY: ICD-10-CM

## 2017-10-05 DIAGNOSIS — I10 ESSENTIAL HYPERTENSION: ICD-10-CM

## 2017-10-05 DIAGNOSIS — Z79.4 TYPE 2 DIABETES MELLITUS WITHOUT COMPLICATION, WITH LONG-TERM CURRENT USE OF INSULIN: ICD-10-CM

## 2017-10-05 DIAGNOSIS — C61 PROSTATE CANCER: ICD-10-CM

## 2017-10-05 DIAGNOSIS — C61 PROSTATE CANCER: Primary | ICD-10-CM

## 2017-10-05 LAB — TESTOST SERPL-MCNC: 282 NG/DL

## 2017-10-05 PROCEDURE — 99244 OFF/OP CNSLTJ NEW/EST MOD 40: CPT | Mod: 24,S$GLB,, | Performed by: UROLOGY

## 2017-10-05 PROCEDURE — 99999 PR PBB SHADOW E&M-EST. PATIENT-LVL IV: CPT | Mod: PBBFAC,,, | Performed by: UROLOGY

## 2017-10-05 PROCEDURE — 99999 PR PBB SHADOW E&M-EST. PATIENT-LVL III: CPT | Mod: PBBFAC,,, | Performed by: UROLOGY

## 2017-10-05 PROCEDURE — 84403 ASSAY OF TOTAL TESTOSTERONE: CPT

## 2017-10-05 PROCEDURE — 99499 UNLISTED E&M SERVICE: CPT | Mod: S$GLB,,, | Performed by: UROLOGY

## 2017-10-05 PROCEDURE — 36415 COLL VENOUS BLD VENIPUNCTURE: CPT

## 2017-10-05 RX ORDER — PENTOXIFYLLINE 400 MG/1
400 TABLET, EXTENDED RELEASE ORAL
Qty: 90 TABLET | Refills: 5 | Status: SHIPPED | OUTPATIENT
Start: 2017-10-05 | End: 2019-12-23 | Stop reason: ALTCHOICE

## 2017-10-05 RX ORDER — TADALAFIL 5 MG/1
TABLET ORAL
Qty: 30 TABLET | Refills: 11 | Status: SHIPPED | OUTPATIENT
Start: 2017-10-05 | End: 2017-10-16 | Stop reason: SDUPTHER

## 2017-10-05 NOTE — PROGRESS NOTES
Clinic Note  10/5/2017      Subjective:         Chief Complaint:   HPI  Rickey Rios is a 57 y.o. male s/p robotic assisted laparoscopic prostatectomy on 9/8/2017.  [place] 6 (3+3); (-) extension, margins, & SV; pT2, pN0, pMx  He was initially diagnosed 8/01/2017 via bx with Dr. Tobias.  PSA pending from today.  Continent and pad free. Doing Kegels.      Lab Results   Component Value Date    PSADIAG 5.7 (H) 07/12/2017    PSADIAG 4.6 (H) 05/18/2017      Past Medical History:   Diagnosis Date    ADD (attention deficit disorder)     Anxiety     Arthritis     Diabetes mellitus, type 2     Dyslexia     Hypertension     preventative     BLAIRE on CPAP     Prostate cancer 8/24/2017     Family History   Problem Relation Age of Onset    Pulmonary fibrosis Mother     Diabetes Father     Atrial fibrillation Brother      Social History     Social History    Marital status:      Spouse name: N/A    Number of children: 4    Years of education: N/A     Occupational History    federal employee      Social History Main Topics    Smoking status: Never Smoker    Smokeless tobacco: Never Used    Alcohol use No    Drug use: No    Sexual activity: Yes     Partners: Female     Other Topics Concern    Not on file     Social History Narrative    No narrative on file     Past Surgical History:   Procedure Laterality Date    BACK SURGERY      HEEL SPUR SURGERY      NECK SURGERY      PROSTATE SURGERY       Patient Active Problem List   Diagnosis    Essential hypertension    Cervical disc disease    Lumbar disc disease    Type 2 diabetes mellitus without complication, with long-term current use of insulin    Prostate cancer    Ankylosing spondylitis    AVIS (acute kidney injury)    Paravesical urinoma    S/P prostatectomy     Review of Systems   Constitutional: Negative for appetite change, chills, fatigue, fever and unexpected weight change.   HENT: Negative for nosebleeds.    Respiratory:  "Negative for shortness of breath and wheezing.    Cardiovascular: Negative for chest pain, palpitations and leg swelling.   Gastrointestinal: Negative for abdominal distention, abdominal pain, constipation, diarrhea, nausea and vomiting.   Genitourinary: Negative for dysuria and hematuria.   Musculoskeletal: Negative for arthralgias and back pain.   Skin: Negative for pallor.   Neurological: Negative for dizziness, seizures and syncope.   Hematological: Negative for adenopathy.   Psychiatric/Behavioral: Negative for dysphoric mood.         Objective:      There were no vitals taken for this visit.  Estimated body mass index is 30.81 kg/m² as calculated from the following:    Height as of 9/26/17: 6' 2" (1.88 m).    Weight as of 9/26/17: 108.9 kg (240 lb).  Physical Exam   Abdominal:   Incisions healing well.         Assessment and Plan:           Problem List Items Addressed This Visit     Prostate cancer - Primary    S/P prostatectomy      Other Visit Diagnoses    None.         Follow up:   4 months with PSA.    Tariq Burns"

## 2017-10-05 NOTE — PROGRESS NOTES
Mr. Rios is a 57 y.o. male presenting for a consultation at the request of Dr. Burns. Patient presents with ED.    PRESENTING ILLNESS:    Rickey Rios is a 57 y.o. male s/p RALP on 9/8/2017 by Dr. Burns.  Since then, he c/o ED.  Prior to surgery, he did have ED mild ED and was on TRT due to fatigue.    He was also told that he had peyronie's.  He noticed a curve to his penis 6 months ago.  No difficulty with penetration due to the curve.    He does not have CJ.  He is wearing 0 pads/day.  No hematuria.  No dysuria.  Good FOS.    No bone pain or weight loss.    REVIEW OF SYSTEMS:    Rickey Rios denies any history of headache, blurred vision, fever, nausea, vomiting, chills, flank discomfort, abdominal pain, bleeding per rectum, cough, SOB, recent loss of consciousness, recent mental status changes, seizures, dizziness, or upper or lower extremity weakness.    COCO  1. 1  2. 0  3. 0  4. 0  5. 0      PATIENT HISTORY:    Past Medical History:   Diagnosis Date    ADD (attention deficit disorder)     Anxiety     Arthritis     Diabetes mellitus, type 2     Dyslexia     Hypertension     preventative     BLAIRE on CPAP     Prostate cancer 8/24/2017       Family History   Problem Relation Age of Onset    Pulmonary fibrosis Mother     Diabetes Father     Atrial fibrillation Brother        Past Surgical History:   Procedure Laterality Date    BACK SURGERY      HEEL SPUR SURGERY      NECK SURGERY      PROSTATE SURGERY         Social History     Social History    Marital status:      Spouse name: N/A    Number of children: 4    Years of education: N/A     Occupational History    federal employee      Social History Main Topics    Smoking status: Never Smoker    Smokeless tobacco: Never Used    Alcohol use No    Drug use: No    Sexual activity: Yes     Partners: Female     Other Topics Concern    None     Social History Narrative    None       Review of patient's allergies  "indicates:   Allergen Reactions    Phenergan [promethazine] Anaphylaxis and Edema     Throat/mouth swelling    Zofran [ondansetron hcl (pf)] Anaphylaxis, Hives and Edema     Throat swelling & mouth    Metformin Nausea And Vomiting         Current Outpatient Prescriptions:     BD ULTRA-FINE BERNARDO PEN NEEDLES 32 gauge x 5/32" Ndle, , Disp: , Rfl:     buPROPion (WELLBUTRIN XL) 300 MG 24 hr tablet, , Disp: , Rfl:     FREESTYLE LANCETS 28 gauge lancets, , Disp: , Rfl:     FREESTYLE LITE STRIPS Strp, , Disp: , Rfl:     glimepiride (AMARYL) 4 MG tablet, , Disp: , Rfl:     indomethacin (INDOCIN SR) 75 mg CpSR CR capsule, , Disp: , Rfl:     INVOKANA 300 mg Tab tablet, , Disp: , Rfl:     LEVEMIR FLEXTOUCH 100 unit/mL (3 mL) InPn pen, 24 Units 2 (two) times daily. , Disp: , Rfl:     lorazepam (ATIVAN) 0.5 MG tablet, Take 0.5 mg by mouth every 6 (six) hours as needed for Anxiety., Disp: , Rfl:     polyethylene glycol (GLYCOLAX) 17 gram/dose powder, , Disp: , Rfl:     sildenafil (REVATIO) 20 mg Tab, Take 1 tablet (20 mg total) by mouth continuous prn (Take 3 tablets, 1 hour prior to intercource, can take up to 5 tablets at once, but do not exceed 5 tablets.)., Disp: 30 tablet, Rfl: 11    simethicone (MYLICON) 80 MG chewable tablet, Take 1 tablet (80 mg total) by mouth 3 (three) times daily as needed for Flatulence., Disp: 30 tablet, Rfl: 2    STRATTERA 80 mg capsule, , Disp: , Rfl:     TRESIBA FLEXTOUCH U-200 200 unit/mL (3 mL) InPn, , Disp: , Rfl:     TRULICITY 0.75 mg/0.5 mL PnIj, , Disp: , Rfl:     valsartan (DIOVAN) 160 MG tablet, , Disp: , Rfl:     pentoxifylline (TRENTAL) 400 mg TbSR, Take 1 tablet (400 mg total) by mouth 3 (three) times daily with meals., Disp: 90 tablet, Rfl: 5    tadalafil (CIALIS) 5 MG tablet, Take 1 PO QOD for prostate cancer ED, Disp: 30 tablet, Rfl: 11      PHYSICAL EXAMINATION:    The patient generally appears in good health, is appropriately interactive, and is in no apparent " distress.     Eyes: anicteric sclerae, moist conjunctivae; no lid-lag; PERRLA     HENT: Atraumatic; oropharynx clear with moist mucous membranes and no mucosal ulcerations;normal hard and soft palate. No evidence of lymphadenopathy.    Neck: Trachea midline.  No thyromegaly.    Musculoskeletal: No abnormal gait.    Skin: No lesions.    Mental: Cooperative with normal affect.  Is oriented to time, place, and person.    Neuro: Grossly intact.    Chest: Normal inspiratory effort.   No accessory muscles.  No audible wheezes.  Respirations symmetric on inspiration and expiration.    Heart: Regular rhythm.      Abdomen:  Soft, non-tender. No masses or organomegaly. Bladder is not palpable. No evidence of flank discomfort. No evidence of inguinal hernia.    Genitourinary: The penis is circumcised with a plaque c/w peyronie's on the shaft. The urethral meatus is normal. The testes, epididymides, and cord structures are normal in size and contour bilaterally. The scrotum is normal in size and contour.    Extremities: No clubbing, cyanosis, or edema        LABS:      Lab Results   Component Value Date    PSADIAG 0.02 10/05/2017    PSADIAG 5.7 (H) 07/12/2017    PSADIAG 4.6 (H) 05/18/2017        IMPRESSION:    Encounter Diagnoses   Name Primary?    Erectile dysfunction following radical prostatectomy Yes    Prostate cancer     Type 2 diabetes mellitus without complication, with long-term current use of insulin     Essential hypertension     Peyronie's disease      DM, controlled  HTN, controlled    PLAN:    1. Discussed penile rehab today.  The risks, benefits, and the evidence was discussed with him today.  We discussed different options.  He would like to try Cialis.  Side effects discussed.  A new Rx was given.  2. We discussed the pathophysiology of Peyronie's disease.  We discussed both the acute and chronic phase of the disease.  Because it has only been present for 6 months, I discussed that I feel he is in the  acute phase of the disease.  While he is in the acute phase I would recommend Trental.  Risks and benefits discussed.  Side effects discussed.  A new Rx for Trental was given today.  3. Will draw a T.  4. RTC 3 months.    Copy to: Grant

## 2017-10-05 NOTE — LETTER
October 5, 2017      Ruth Ashraf, JOSÉ  [address]  [facility] [address]           [facility]  [address]  [facility] [address]  Phone: [phone]          Patient: iRckey Rios   MR Number: [id number]   YOB: 1960   Date of Visit: 10/5/2017       Dear Ruth Ashraf:    Thank you for referring Rickey Rios to me for evaluation. Attached you will find relevant portions of my assessment and plan of care.    If you have questions, please do not hesitate to call me. I look forward to following Rickey Rios along with you.    Sincerely,    Denis Goodwin MD    Enclosure  CC:  No Recipients    If you would like to receive this communication electronically, please contact [email or web address] or [phone] to request more information on [employer] Link access.    For providers and/or their staff who would like to refer a patient to [facility], please contact us through our one-stop-shop provider referral line, [facility] , at [phone].    If you feel you have received this communication in error or would no longer like to receive these types of communications, please e-mail [email or web address]

## 2017-10-06 ENCOUNTER — TELEPHONE (OUTPATIENT)
Dept: UROLOGY | Facility: CLINIC | Age: 57
End: 2017-10-06

## 2017-10-06 DIAGNOSIS — E29.1 MALE HYPOGONADISM: Primary | ICD-10-CM

## 2017-10-09 ENCOUNTER — TELEPHONE (OUTPATIENT)
Dept: UROLOGY | Facility: CLINIC | Age: 57
End: 2017-10-09

## 2017-10-09 NOTE — TELEPHONE ENCOUNTER
Pt notified of results. F/u appt scheduled to retest levels. Pt aware and verbalized understanding.

## 2017-10-09 NOTE — TELEPHONE ENCOUNTER
Prior auth initiated through [facility] [phone]. Prior auth denied. Letter to be mailed to pt to appeal. Office will receive denial letter by fax within 2 days; message left on pts v/m.

## 2017-10-10 ENCOUNTER — TELEPHONE (OUTPATIENT)
Dept: UROLOGY | Facility: CLINIC | Age: 57
End: 2017-10-10

## 2017-10-10 NOTE — TELEPHONE ENCOUNTER
I spoke with patient, who stated he has been having diarrhea and  throwing up off and on since he saw us last and has lost weight, patient wanted to know who he should see, I advised patient to see his PCP, patient agreed.

## 2017-10-10 NOTE — TELEPHONE ENCOUNTER
----- Message from Annie Olmos MA sent at 10/10/2017  1:13 PM CDT -----  Contact: self  [phone]  States he is calling regarding some issues that is going on and he wants to check with you if it is something he needs to see or does he need to go elsewhere.

## 2017-10-11 ENCOUNTER — LAB VISIT (OUTPATIENT)
Dept: LAB | Facility: HOSPITAL | Age: 57
End: 2017-10-11
Attending: UROLOGY
Payer: COMMERCIAL

## 2017-10-11 DIAGNOSIS — E29.1 MALE HYPOGONADISM: ICD-10-CM

## 2017-10-11 LAB
PROLACTIN SERPL IA-MCNC: 7.6 NG/ML
TESTOST SERPL-MCNC: 276 NG/DL

## 2017-10-11 PROCEDURE — 36415 COLL VENOUS BLD VENIPUNCTURE: CPT | Mod: PO

## 2017-10-11 PROCEDURE — 84403 ASSAY OF TOTAL TESTOSTERONE: CPT

## 2017-10-11 PROCEDURE — 84146 ASSAY OF PROLACTIN: CPT

## 2017-10-12 ENCOUNTER — TELEPHONE (OUTPATIENT)
Dept: UROLOGY | Facility: CLINIC | Age: 57
End: 2017-10-12

## 2017-10-12 NOTE — TELEPHONE ENCOUNTER
----- Message from Annie Olmos MA sent at 10/12/2017 10:03 AM CDT -----  Contact: Nivia berry/[facility]  [phone]  States she is calling and asking for you to call her stat since the patient is doing pre-op and is in the office now.    States she needs to know what lab work he had and if he had an ekg.

## 2017-10-12 NOTE — TELEPHONE ENCOUNTER
Pt notified of results. F/U appt scheduled for Monday 10/16/17 at 345pm. Pt aware and verbalized understanding.

## 2017-10-16 ENCOUNTER — OFFICE VISIT (OUTPATIENT)
Dept: UROLOGY | Facility: CLINIC | Age: 57
End: 2017-10-16
Payer: COMMERCIAL

## 2017-10-16 VITALS
DIASTOLIC BLOOD PRESSURE: 77 MMHG | BODY MASS INDEX: 30.56 KG/M2 | WEIGHT: 238.13 LBS | HEIGHT: 74 IN | HEART RATE: 96 BPM | SYSTOLIC BLOOD PRESSURE: 129 MMHG

## 2017-10-16 DIAGNOSIS — C61 PROSTATE CANCER: ICD-10-CM

## 2017-10-16 DIAGNOSIS — E29.1 MALE HYPOGONADISM: Primary | ICD-10-CM

## 2017-10-16 DIAGNOSIS — N52.31 ERECTILE DYSFUNCTION FOLLOWING RADICAL PROSTATECTOMY: ICD-10-CM

## 2017-10-16 DIAGNOSIS — E11.9 TYPE 2 DIABETES MELLITUS WITHOUT COMPLICATION, WITH LONG-TERM CURRENT USE OF INSULIN: ICD-10-CM

## 2017-10-16 DIAGNOSIS — Z79.4 TYPE 2 DIABETES MELLITUS WITHOUT COMPLICATION, WITH LONG-TERM CURRENT USE OF INSULIN: ICD-10-CM

## 2017-10-16 DIAGNOSIS — I10 ESSENTIAL HYPERTENSION: ICD-10-CM

## 2017-10-16 PROCEDURE — 99214 OFFICE O/P EST MOD 30 MIN: CPT | Mod: 24,S$GLB,, | Performed by: UROLOGY

## 2017-10-16 PROCEDURE — 99999 PR PBB SHADOW E&M-EST. PATIENT-LVL III: CPT | Mod: PBBFAC,,, | Performed by: UROLOGY

## 2017-10-16 RX ORDER — TADALAFIL 5 MG/1
TABLET ORAL
Qty: 30 TABLET | Refills: 11 | Status: SHIPPED | OUTPATIENT
Start: 2017-10-16 | End: 2017-11-02 | Stop reason: SDUPTHER

## 2017-10-16 RX ORDER — TADALAFIL 5 MG/1
TABLET ORAL
Qty: 30 TABLET | Refills: 11 | Status: SHIPPED | OUTPATIENT
Start: 2017-10-16 | End: 2017-10-16 | Stop reason: SDUPTHER

## 2017-10-16 RX ORDER — INSULIN GLARGINE 300 [IU]/ML
INJECTION, SOLUTION SUBCUTANEOUS
COMMUNITY
End: 2022-05-25 | Stop reason: ALTCHOICE

## 2017-10-16 NOTE — PATIENT INSTRUCTIONS
Testosterone Implant  What is this medicine?  TESTOSTERONE (evan TOS ter one) is the main male hormone. It supports normal male traits such as muscle growth, facial hair, and deep voice. This medicine is used in males to treat low testosterone levels.  This medicine may be used for other purposes; ask your health care provider or pharmacist if you have questions.  What should I tell my health care provider before I take this medicine?  They need to know if you have any of these conditions:  · breast cancer  · diabetes  · heart disease  · kidney disease  · liver disease  · lung disease  · prostate cancer, enlargement  · an unusual or allergic reaction to testosterone, other medicines, foods, dyes, or preservatives  · this drug is not for use in females  How should I use this medicine?  This medicine will be inserted under your skin by your doctor or health care professional.  Contact your pediatrician or health care professional regarding the use of this medicine in children. While this medicine may be prescribed for children for selected conditions, precautions do apply.  Overdosage: If you think you have taken too much of this medicine contact a poison control center or emergency room at once.  NOTE: This medicine is only for you. Do not share this medicine with others.  What if I miss a dose?  Try not to miss a dose. Your doctor or health care professional will tell you when your next dose is due. Notify the office if you are unable to keep an appointment.  What may interact with this medicine?  · medicines for diabetes  · medicines that treat or prevent blood clots like warfarin  · oxyphenbutazone  · propranolol  · steroid medicines like prednisone or cortisone  This list may not describe all possible interactions. Give your health care provider a list of all the medicines, herbs, non-prescription drugs, or dietary supplements you use. Also tell them if you smoke, drink alcohol, or use illegal drugs. Some items  may interact with your medicine.  What should I watch for while using this medicine?  Visit your doctor or health care professional for regular checks on your progress. They will need to check the level of testosterone in your blood.  This medicine may affect blood sugar levels. If you have diabetes, check with your doctor or health care professional before you change your diet or the dose of your diabetic medicine.  Call your doctor or health care professional if you notice a change in the way this medicine is working. It is possible that the pellets may accidentally fall out.  This drug is banned from use in athletes by most athletic organizations.  What side effects may I notice from receiving this medicine?  Side effects that you should report to your doctor or health care professional as soon as possible:  · allergic reactions like skin rash, itching or hives, swelling of the face, lips, or tongue  · Infection  · breast enlargement  · breathing problems  · changes in mood, especially anger, depression, or rage  · dark urine  · general ill feeling or flu-like symptoms  · light-colored stools  · loss of appetite, nausea  · nausea, vomiting  · right upper belly pain  · stomach pain  · swelling of ankles  · too frequent or persistent erections  · trouble passing urine or change in the amount of urine  · unusually weak or tired  · yellowing of eyes or skin  Side effects that usually do not require medical attention (report to your doctor or health care professional if they continue or are bothersome):  · acne  · change in sex drive or performance  · hair loss  · headache  This list may not describe all possible side effects. Call your doctor for medical advice about side effects. You may report side effects to FDA at 6-400-FDA-2469.  Where should I keep my medicine?  This drug will be inserted under your skin by your doctor. It will not be stored at home.  NOTE:This sheet is a summary. It may not cover all possible  information. If you have questions about this medicine, talk to your doctor, pharmacist, or health care provider. Copyright© 2011 [employer]

## 2017-10-16 NOTE — PROGRESS NOTES
Mr. Rios is a 57 y.o. male presenting with ED.    PRESENTING ILLNESS:    Rickey Rios is a 57 y.o. male s/p RALP on 9/8/2017 by Dr. Burns.  Since then, he c/o ED.  Prior to surgery, he did have ED mild ED and was on TRT due to fatigue.  He'd like to resume TRT.    He was also told that he had peyronie's.  He noticed a curve to his penis 6 months ago.  No difficulty with penetration due to the curve.    He does not have CJ.  He is wearing 0 pads/day.  No hematuria.  No dysuria.  Good FOS.    No bone pain or weight loss.    REVIEW OF SYSTEMS:    Rickey Rios denies any history of headache, blurred vision, fever, nausea, vomiting, chills, flank discomfort, abdominal pain, bleeding per rectum, cough, SOB, recent loss of consciousness, recent mental status changes, seizures, dizziness, or upper or lower extremity weakness.    COCO  1. 1  2. 0  3. 0  4. 0  5. 0      PATIENT HISTORY:    Past Medical History:   Diagnosis Date    ADD (attention deficit disorder)     Anxiety     Arthritis     Diabetes mellitus, type 2     Dyslexia     Hypertension     preventative     BLAIRE on CPAP     Prostate cancer 8/24/2017       Family History   Problem Relation Age of Onset    Pulmonary fibrosis Mother     Diabetes Father     Atrial fibrillation Brother        Past Surgical History:   Procedure Laterality Date    BACK SURGERY      HEEL SPUR SURGERY      NECK SURGERY      PROSTATE SURGERY         Social History     Social History    Marital status:      Spouse name: N/A    Number of children: 4    Years of education: N/A     Occupational History    federal employee      Social History Main Topics    Smoking status: Never Smoker    Smokeless tobacco: Never Used    Alcohol use No    Drug use: No    Sexual activity: Yes     Partners: Female     Other Topics Concern    None     Social History Narrative    None       Review of patient's allergies indicates:   Allergen Reactions     "Phenergan [promethazine] Anaphylaxis and Edema     Throat/mouth swelling    Zofran [ondansetron hcl (pf)] Anaphylaxis, Hives and Edema     Throat swelling & mouth    Metformin Nausea And Vomiting         Current Outpatient Prescriptions:     BD ULTRA-FINE BERNARDO PEN NEEDLES 32 gauge x 5/32" Ndle, , Disp: , Rfl:     buPROPion (WELLBUTRIN XL) 300 MG 24 hr tablet, , Disp: , Rfl:     FREESTYLE LANCETS 28 gauge lancets, , Disp: , Rfl:     FREESTYLE LITE STRIPS Strp, , Disp: , Rfl:     glimepiride (AMARYL) 4 MG tablet, , Disp: , Rfl:     indomethacin (INDOCIN SR) 75 mg CpSR CR capsule, , Disp: , Rfl:     insulin glargine, TOUJEO, (TOUJEO SOLOSTAR) 300 unit/mL (1.5 mL) InPn pen, Inject into the skin., Disp: , Rfl:     INVOKANA 300 mg Tab tablet, , Disp: , Rfl:     lorazepam (ATIVAN) 0.5 MG tablet, Take 0.5 mg by mouth every 6 (six) hours as needed for Anxiety., Disp: , Rfl:     pentoxifylline (TRENTAL) 400 mg TbSR, Take 1 tablet (400 mg total) by mouth 3 (three) times daily with meals., Disp: 90 tablet, Rfl: 5    polyethylene glycol (GLYCOLAX) 17 gram/dose powder, , Disp: , Rfl:     sildenafil (REVATIO) 20 mg Tab, Take 1 tablet (20 mg total) by mouth continuous prn (Take 3 tablets, 1 hour prior to intercource, can take up to 5 tablets at once, but do not exceed 5 tablets.)., Disp: 30 tablet, Rfl: 11    STRATTERA 80 mg capsule, , Disp: , Rfl:     tadalafil (CIALIS) 5 MG tablet, Take 1 PO QOD for prostate cancer ED, Disp: 30 tablet, Rfl: 11    valsartan (DIOVAN) 160 MG tablet, , Disp: , Rfl:     simethicone (MYLICON) 80 MG chewable tablet, Take 1 tablet (80 mg total) by mouth 3 (three) times daily as needed for Flatulence., Disp: 30 tablet, Rfl: 2      PHYSICAL EXAMINATION:    The patient generally appears in good health, is appropriately interactive, and is in no apparent distress.     Eyes: anicteric sclerae, moist conjunctivae; no lid-lag; PERRLA     HENT: Atraumatic; oropharynx clear with moist mucous " membranes and no mucosal ulcerations;normal hard and soft palate. No evidence of lymphadenopathy.    Neck: Trachea midline.  No thyromegaly.    Musculoskeletal: No abnormal gait.    Skin: No lesions.    Mental: Cooperative with normal affect.  Is oriented to time, place, and person.    Neuro: Grossly intact.    Chest: Normal inspiratory effort.   No accessory muscles.  No audible wheezes.  Respirations symmetric on inspiration and expiration.    Heart: Regular rhythm.      Abdomen:  Soft, non-tender. No masses or organomegaly. Bladder is not palpable. No evidence of flank discomfort. No evidence of inguinal hernia.    Genitourinary: The penis is circumcised with a plaque c/w peyronie's on the shaft. The urethral meatus is normal. The testes, epididymides, and cord structures are normal in size and contour bilaterally. The scrotum is normal in size and contour.    Extremities: No clubbing, cyanosis, or edema        LABS:      Lab Results   Component Value Date    PSADIAG 0.02 10/05/2017    PSADIAG 5.7 (H) 07/12/2017    PSADIAG 4.6 (H) 05/18/2017        IMPRESSION:    Encounter Diagnoses   Name Primary?    Male hypogonadism Yes    Erectile dysfunction following radical prostatectomy     Prostate cancer     Essential hypertension     Type 2 diabetes mellitus without complication, with long-term current use of insulin      DM, controlled  HTN, controlled    PLAN:    1. Discussed that TRT after RALP is controversial.  He's willing to accept the risk.   2. Discussed the risks and benefits of testosterone replacement today.  This included possible cardiac risks.  He would like to try this.    3. Risks and benefits of testopel were discussed today.  We discussed infection, pain, bleeding, pellet extrusion.  He was given the chance to ask questions.  4. Continue trental for the peyronie's.  5. Continue cialis for the rehab.  6. RTC for testopel.  Copy to: Grant

## 2017-10-17 ENCOUNTER — TELEPHONE (OUTPATIENT)
Dept: UROLOGY | Facility: CLINIC | Age: 57
End: 2017-10-17

## 2017-10-17 NOTE — TELEPHONE ENCOUNTER
----- Message from Bebe Rico MA sent at 10/17/2017 11:46 AM CDT -----  Contact:  Mobile: [phone]    Mobile: [phone]   Has a few thing to discuss with Bety and also wants to send you some info by email (no other info provided)

## 2017-10-17 NOTE — TELEPHONE ENCOUNTER
Returned pts call. Pt called to state he received denial letter from his insurance company. Will bring with him to next appt on Monday 10/23/17

## 2017-10-19 ENCOUNTER — TELEPHONE (OUTPATIENT)
Dept: UROLOGY | Facility: CLINIC | Age: 57
End: 2017-10-19

## 2017-10-19 NOTE — TELEPHONE ENCOUNTER
----- Message from Bebe Rico MA sent at 10/19/2017 10:26 AM CDT -----  Contact: Mobile: [phone]   Needing to reschedule his testopel on 10/23 but  Needs to speak with Bety stern.    Mobile: [phone]

## 2017-10-19 NOTE — TELEPHONE ENCOUNTER
Spoke with pt. Pt to r/s testopel procedure 2/2 recent surgery on arm, pt unable to drive to appt. appt r/s to 11/2/17 at 345pm. appt slip mailed.

## 2017-11-02 ENCOUNTER — OFFICE VISIT (OUTPATIENT)
Dept: UROLOGY | Facility: CLINIC | Age: 57
End: 2017-11-02
Payer: COMMERCIAL

## 2017-11-02 VITALS
SYSTOLIC BLOOD PRESSURE: 127 MMHG | HEIGHT: 74 IN | DIASTOLIC BLOOD PRESSURE: 83 MMHG | WEIGHT: 245.38 LBS | BODY MASS INDEX: 31.49 KG/M2 | HEART RATE: 98 BPM

## 2017-11-02 DIAGNOSIS — I10 ESSENTIAL HYPERTENSION: ICD-10-CM

## 2017-11-02 DIAGNOSIS — C61 PROSTATE CANCER: ICD-10-CM

## 2017-11-02 DIAGNOSIS — N52.31 ERECTILE DYSFUNCTION FOLLOWING RADICAL PROSTATECTOMY: ICD-10-CM

## 2017-11-02 DIAGNOSIS — E29.1 MALE HYPOGONADISM: Primary | ICD-10-CM

## 2017-11-02 PROCEDURE — S0189 TESTOSTERONE PELLET 75 MG: HCPCS | Mod: S$GLB,,, | Performed by: UROLOGY

## 2017-11-02 PROCEDURE — 99999 PR PBB SHADOW E&M-EST. PATIENT-LVL III: CPT | Mod: PBBFAC,,, | Performed by: UROLOGY

## 2017-11-02 PROCEDURE — 11980 IMPLANT HORMONE PELLET(S): CPT | Mod: 79,S$GLB,, | Performed by: UROLOGY

## 2017-11-02 PROCEDURE — 99214 OFFICE O/P EST MOD 30 MIN: CPT | Mod: 25,24,S$GLB, | Performed by: UROLOGY

## 2017-11-02 RX ORDER — TADALAFIL 5 MG/1
TABLET ORAL
Qty: 30 TABLET | Refills: 11 | Status: SHIPPED | OUTPATIENT
Start: 2017-11-02 | End: 2019-12-23 | Stop reason: ALTCHOICE

## 2017-11-02 NOTE — PROGRESS NOTES
Mr. Rios is a 57 y.o. male presenting with ED.    PRESENTING ILLNESS:    Rickey Rios is a 57 y.o. male s/p RALP on 9/8/2017 by Dr. Burns.  Since then, he c/o ED.  Prior to surgery, he did have ED mild ED and was on TRT due to fatigue.  He'd like to resume TRT.    He was also told that he had peyronie's.  He noticed a curve to his penis 6 months ago.  No difficulty with penetration due to the curve.    He does not have CJ.  He is wearing 0 pads/day.  No hematuria.  No dysuria.  Good FOS.    No bone pain or weight loss.    REVIEW OF SYSTEMS:    Rickey Rios denies any history of headache, blurred vision, fever, nausea, vomiting, chills, flank discomfort, abdominal pain, bleeding per rectum, cough, SOB, recent loss of consciousness, recent mental status changes, seizures, dizziness, or upper or lower extremity weakness.    COCO  1. 1  2. 0  3. 0  4. 0  5. 0      PATIENT HISTORY:    Past Medical History:   Diagnosis Date    ADD (attention deficit disorder)     Anxiety     Arthritis     Diabetes mellitus, type 2     Dyslexia     Hypertension     preventative     BLAIRE on CPAP     Prostate cancer 8/24/2017       Family History   Problem Relation Age of Onset    Pulmonary fibrosis Mother     Diabetes Father     Atrial fibrillation Brother        Past Surgical History:   Procedure Laterality Date    BACK SURGERY      HEEL SPUR SURGERY      NECK SURGERY      PROSTATE SURGERY         Social History     Social History    Marital status:      Spouse name: N/A    Number of children: 4    Years of education: N/A     Occupational History    federal employee      Social History Main Topics    Smoking status: Never Smoker    Smokeless tobacco: Never Used    Alcohol use No    Drug use: No    Sexual activity: Yes     Partners: Female     Other Topics Concern    Not on file     Social History Narrative    No narrative on file       Review of patient's allergies indicates:   Allergen  "Reactions    Phenergan [promethazine] Anaphylaxis and Edema     Throat/mouth swelling    Zofran [ondansetron hcl (pf)] Anaphylaxis, Hives and Edema     Throat swelling & mouth    Metformin Nausea And Vomiting         Current Outpatient Prescriptions:     BD ULTRA-FINE BERNARDO PEN NEEDLES 32 gauge x 5/32" Ndle, , Disp: , Rfl:     buPROPion (WELLBUTRIN XL) 300 MG 24 hr tablet, , Disp: , Rfl:     FREESTYLE LANCETS 28 gauge lancets, , Disp: , Rfl:     FREESTYLE LITE STRIPS Strp, , Disp: , Rfl:     glimepiride (AMARYL) 4 MG tablet, , Disp: , Rfl:     indomethacin (INDOCIN SR) 75 mg CpSR CR capsule, , Disp: , Rfl:     insulin glargine, TOUJEO, (TOUJEO SOLOSTAR) 300 unit/mL (1.5 mL) InPn pen, Inject into the skin., Disp: , Rfl:     INVOKANA 300 mg Tab tablet, , Disp: , Rfl:     lorazepam (ATIVAN) 0.5 MG tablet, Take 0.5 mg by mouth every 6 (six) hours as needed for Anxiety., Disp: , Rfl:     pentoxifylline (TRENTAL) 400 mg TbSR, Take 1 tablet (400 mg total) by mouth 3 (three) times daily with meals., Disp: 90 tablet, Rfl: 5    polyethylene glycol (GLYCOLAX) 17 gram/dose powder, , Disp: , Rfl:     sildenafil (REVATIO) 20 mg Tab, Take 1 tablet (20 mg total) by mouth continuous prn (Take 3 tablets, 1 hour prior to intercource, can take up to 5 tablets at once, but do not exceed 5 tablets.)., Disp: 30 tablet, Rfl: 11    simethicone (MYLICON) 80 MG chewable tablet, Take 1 tablet (80 mg total) by mouth 3 (three) times daily as needed for Flatulence., Disp: 30 tablet, Rfl: 2    STRATTERA 80 mg capsule, , Disp: , Rfl:     tadalafil (CIALIS) 5 MG tablet, Take 1 PO QOD for prostate cancer ED, Disp: 30 tablet, Rfl: 11    valsartan (DIOVAN) 160 MG tablet, , Disp: , Rfl:     Current Facility-Administered Medications:     [START ON 3/25/2018] testosterone Pllt 12 Pellet, 12 Pellet, Implant, 1 time in Clinic/HOD, Denis Goodwin MD      PHYSICAL EXAMINATION:    The patient generally appears in good health, is " appropriately interactive, and is in no apparent distress.     Eyes: anicteric sclerae, moist conjunctivae; no lid-lag; PERRLA     HENT: Atraumatic; oropharynx clear with moist mucous membranes and no mucosal ulcerations;normal hard and soft palate. No evidence of lymphadenopathy.    Neck: Trachea midline.  No thyromegaly.    Musculoskeletal: No abnormal gait.    Skin: No lesions.    Mental: Cooperative with normal affect.  Is oriented to time, place, and person.    Neuro: Grossly intact.    Chest: Normal inspiratory effort.   No accessory muscles.  No audible wheezes.  Respirations symmetric on inspiration and expiration.    Heart: Regular rhythm.      Abdomen:  Soft, non-tender. No masses or organomegaly. Bladder is not palpable. No evidence of flank discomfort. No evidence of inguinal hernia.    Genitourinary: The penis is circumcised with a plaque c/w peyronie's on the shaft. The urethral meatus is normal. The testes, epididymides, and cord structures are normal in size and contour bilaterally. The scrotum is normal in size and contour.    Extremities: No clubbing, cyanosis, or edema        LABS:      Lab Results   Component Value Date    PSADIAG 0.02 10/05/2017    PSADIAG 5.7 (H) 07/12/2017    PSADIAG 4.6 (H) 05/18/2017        IMPRESSION:    Encounter Diagnoses   Name Primary?    Male hypogonadism Yes    Erectile dysfunction following radical prostatectomy     Prostate cancer     Essential hypertension      DM, controlled  HTN, controlled    PLAN:    1. Discussed that TRT after RALP is controversial.  He's willing to accept the risk.   2. Continue trental for the peyronie's.  3. Continue cialis for the rehab.  4. Procedure Report:Testopel Insertion    A Time Out was performed with the patient and nurse in the room.   The site was marked with a yes. Verbal consent was obtained. The risks and benefits of testosterone replacement were explained. The alternatives of observation and treatment with injections and  gel therapy were discussed.     The risks of testopel explained to the patient include but are not limited to worsening of BPH, edema with or without congestive heart failure, gynecomastia, cellulitis and abscess, venous thromboembolic events, testicular atrophy, possible cardiac sequelae and with high dose testosterone the risk of liver function elevation (peliosis hepatitis) and hepatocellular carcinoma. Peliosis hepatitis can be a fatal complication.     L gluteal region prepped and draped in sterile fashion.  20 cc of 2% lidocaine used for local analgesia.  Small incision made with a 15 blade.  Standard trocars used for subcutaneous insertion of 12 pellets total in a V shaped tract.  No active bleeding noted.  Area cleaned and dried. Steri strips applied. Dressing applied.      He can RTC 6 months with T, psa, cbc, hepatic panel lipid panel.     Will also check a T in 1 month    Copy to:

## 2017-12-04 ENCOUNTER — LAB VISIT (OUTPATIENT)
Dept: LAB | Facility: HOSPITAL | Age: 57
End: 2017-12-04
Attending: UROLOGY
Payer: COMMERCIAL

## 2017-12-04 DIAGNOSIS — E29.1 MALE HYPOGONADISM: ICD-10-CM

## 2017-12-04 LAB — TESTOST SERPL-MCNC: 821 NG/DL

## 2017-12-04 PROCEDURE — 84403 ASSAY OF TOTAL TESTOSTERONE: CPT

## 2017-12-04 PROCEDURE — 36415 COLL VENOUS BLD VENIPUNCTURE: CPT | Mod: PO

## 2018-02-05 ENCOUNTER — LAB VISIT (OUTPATIENT)
Dept: LAB | Facility: HOSPITAL | Age: 58
End: 2018-02-05
Attending: UROLOGY
Payer: COMMERCIAL

## 2018-02-05 DIAGNOSIS — C61 PROSTATE CANCER: ICD-10-CM

## 2018-02-05 DIAGNOSIS — Z90.79 S/P PROSTATECTOMY: ICD-10-CM

## 2018-02-05 LAB — COMPLEXED PSA SERPL-MCNC: <0.01 NG/ML

## 2018-02-05 PROCEDURE — 84153 ASSAY OF PSA TOTAL: CPT

## 2018-02-05 PROCEDURE — 36415 COLL VENOUS BLD VENIPUNCTURE: CPT | Mod: PO

## 2018-02-06 ENCOUNTER — OFFICE VISIT (OUTPATIENT)
Dept: UROLOGY | Facility: CLINIC | Age: 58
End: 2018-02-06
Payer: COMMERCIAL

## 2018-02-06 VITALS
BODY MASS INDEX: 33.37 KG/M2 | HEART RATE: 78 BPM | DIASTOLIC BLOOD PRESSURE: 92 MMHG | RESPIRATION RATE: 15 BRPM | WEIGHT: 260 LBS | HEIGHT: 74 IN | SYSTOLIC BLOOD PRESSURE: 144 MMHG

## 2018-02-06 DIAGNOSIS — C61 PROSTATE CANCER: Primary | ICD-10-CM

## 2018-02-06 PROCEDURE — 3008F BODY MASS INDEX DOCD: CPT | Mod: S$GLB,,, | Performed by: UROLOGY

## 2018-02-06 PROCEDURE — 99999 PR PBB SHADOW E&M-EST. PATIENT-LVL III: CPT | Mod: PBBFAC,,, | Performed by: UROLOGY

## 2018-02-06 PROCEDURE — 99214 OFFICE O/P EST MOD 30 MIN: CPT | Mod: S$GLB,,, | Performed by: UROLOGY

## 2018-02-06 NOTE — PROGRESS NOTES
Clinic Note  2/6/2018      Subjective:         Chief Complaint:   HPI  Rickey Rios is a 57 y.o. male s/p robotic assisted laparoscopic prostatectomy on 9/8/2017.  [place] 6 (3+3); (-) extension, margins, & SV; pT2, pN0, pMx  He was initially diagnosed 8/01/2017 via bx with Dr. Tobias.  PSA <0.01.  Continent and pad free. Doing Kegels.  Exercising daily.      Lab Results   Component Value Date    PSADIAG <0.01 02/05/2018    PSADIAG 0.02 10/05/2017    PSADIAG 5.7 (H) 07/12/2017    PSADIAG 4.6 (H) 05/18/2017      Past Medical History:   Diagnosis Date    ADD (attention deficit disorder)     Anxiety     Arthritis     Diabetes mellitus, type 2     Dyslexia     H/O elbow surgery 10/2017    Hypertension     preventative     BLAIRE on CPAP     Prostate cancer 8/24/2017     Family History   Problem Relation Age of Onset    Pulmonary fibrosis Mother     Diabetes Father     Atrial fibrillation Brother      Social History     Social History    Marital status:      Spouse name: N/A    Number of children: 4    Years of education: N/A     Occupational History    federal employee      Social History Main Topics    Smoking status: Never Smoker    Smokeless tobacco: Never Used    Alcohol use No    Drug use: No    Sexual activity: Yes     Partners: Female     Other Topics Concern    Not on file     Social History Narrative    No narrative on file     Past Surgical History:   Procedure Laterality Date    BACK SURGERY      HEEL SPUR SURGERY      NECK SURGERY      PROSTATE SURGERY       Patient Active Problem List   Diagnosis    Essential hypertension    Cervical disc disease    Lumbar disc disease    Type 2 diabetes mellitus without complication, with long-term current use of insulin    Prostate cancer    Ankylosing spondylitis    AVIS (acute kidney injury)    Erectile dysfunction following radical prostatectomy    Male hypogonadism     Review of Systems   Constitutional: Negative for  "appetite change, chills, fatigue, fever and unexpected weight change.   HENT: Negative for nosebleeds.    Respiratory: Negative for shortness of breath and wheezing.    Cardiovascular: Negative for chest pain, palpitations and leg swelling.   Gastrointestinal: Negative for abdominal distention, abdominal pain, constipation, diarrhea, nausea and vomiting.   Musculoskeletal: Negative for arthralgias and back pain.   Skin: Negative for pallor.   Neurological: Negative for dizziness, seizures and syncope.   Hematological: Negative for adenopathy.   Psychiatric/Behavioral: Negative for dysphoric mood.         Objective:      There were no vitals taken for this visit.  Estimated body mass index is 31.5 kg/m² as calculated from the following:    Height as of 11/2/17: 6' 2" (1.88 m).    Weight as of 11/2/17: 111.3 kg (245 lb 6 oz).  Physical Exam   Constitutional: He is oriented to person, place, and time. He appears well-developed and well-nourished. No distress.   HENT:   Head: Atraumatic.   Neck: No tracheal deviation present.   Cardiovascular: Normal rate.    Pulmonary/Chest: Effort normal. No respiratory distress. He has no wheezes.   Abdominal: Soft. Bowel sounds are normal. He exhibits no distension and no mass. There is no tenderness. There is no rebound and no guarding.   Neurological: He is alert and oriented to person, place, and time.   Skin: Skin is warm and dry. He is not diaphoretic.     Psychiatric: He has a normal mood and affect. His behavior is normal. Judgment and thought content normal.         Assessment and Plan:           Problem List Items Addressed This Visit     Prostate cancer - Primary          Follow up:   6 months with PSA.    Tariq Burns"

## 2018-04-30 ENCOUNTER — TELEPHONE (OUTPATIENT)
Dept: UROLOGY | Facility: CLINIC | Age: 58
End: 2018-04-30

## 2018-04-30 ENCOUNTER — LAB VISIT (OUTPATIENT)
Dept: LAB | Facility: HOSPITAL | Age: 58
End: 2018-04-30
Attending: UROLOGY
Payer: COMMERCIAL

## 2018-04-30 DIAGNOSIS — E29.1 MALE HYPOGONADISM: ICD-10-CM

## 2018-04-30 LAB
ALBUMIN SERPL BCP-MCNC: 3.8 G/DL
ALP SERPL-CCNC: 112 U/L
ALT SERPL W/O P-5'-P-CCNC: 40 U/L
AST SERPL-CCNC: 24 U/L
BASOPHILS # BLD AUTO: 0.08 K/UL
BASOPHILS NFR BLD: 1.5 %
BILIRUB DIRECT SERPL-MCNC: 0.2 MG/DL
BILIRUB SERPL-MCNC: 0.6 MG/DL
CHOLEST SERPL-MCNC: 209 MG/DL
CHOLEST/HDLC SERPL: 3.9 {RATIO}
COMPLEXED PSA SERPL-MCNC: <0.01 NG/ML
DIFFERENTIAL METHOD: ABNORMAL
EOSINOPHIL # BLD AUTO: 0.3 K/UL
EOSINOPHIL NFR BLD: 5.7 %
ERYTHROCYTE [DISTWIDTH] IN BLOOD BY AUTOMATED COUNT: 13.7 %
HCT VFR BLD AUTO: 46.7 %
HDLC SERPL-MCNC: 54 MG/DL
HDLC SERPL: 25.8 %
HGB BLD-MCNC: 15 G/DL
IMM GRANULOCYTES # BLD AUTO: 0.02 K/UL
IMM GRANULOCYTES NFR BLD AUTO: 0.4 %
LDLC SERPL CALC-MCNC: 129 MG/DL
LYMPHOCYTES # BLD AUTO: 2 K/UL
LYMPHOCYTES NFR BLD: 36.2 %
MCH RBC QN AUTO: 26.8 PG
MCHC RBC AUTO-ENTMCNC: 32.1 G/DL
MCV RBC AUTO: 84 FL
MONOCYTES # BLD AUTO: 0.6 K/UL
MONOCYTES NFR BLD: 10.2 %
NEUTROPHILS # BLD AUTO: 2.5 K/UL
NEUTROPHILS NFR BLD: 46 %
NONHDLC SERPL-MCNC: 155 MG/DL
NRBC BLD-RTO: 0 /100 WBC
PLATELET # BLD AUTO: 259 K/UL
PMV BLD AUTO: 9.7 FL
PROT SERPL-MCNC: 7.4 G/DL
RBC # BLD AUTO: 5.59 M/UL
TESTOST SERPL-MCNC: 320 NG/DL
TRIGL SERPL-MCNC: 130 MG/DL
WBC # BLD AUTO: 5.47 K/UL

## 2018-04-30 PROCEDURE — 84403 ASSAY OF TOTAL TESTOSTERONE: CPT

## 2018-04-30 PROCEDURE — 36415 COLL VENOUS BLD VENIPUNCTURE: CPT | Mod: PO

## 2018-04-30 PROCEDURE — 84153 ASSAY OF PSA TOTAL: CPT

## 2018-04-30 PROCEDURE — 85025 COMPLETE CBC W/AUTO DIFF WBC: CPT

## 2018-04-30 PROCEDURE — 80076 HEPATIC FUNCTION PANEL: CPT

## 2018-04-30 PROCEDURE — 80061 LIPID PANEL: CPT

## 2018-04-30 NOTE — TELEPHONE ENCOUNTER
Spoke to pt. Informed pt appt scheduled for testopel 5/30/18 at 11am. Pt verbalizes understanding. appt notice mailed.

## 2018-04-30 NOTE — TELEPHONE ENCOUNTER
----- Message from Jessica Ashraf sent at 4/30/2018 10:05 AM CDT -----  Contact: pt [phone]  Pt states he would like to schedule a testopel appt. Pt had lbs done today and f/u appt with Aditi 05/04/18.

## 2018-05-02 ENCOUNTER — TELEPHONE (OUTPATIENT)
Dept: UROLOGY | Facility: CLINIC | Age: 58
End: 2018-05-02

## 2018-05-04 ENCOUNTER — OFFICE VISIT (OUTPATIENT)
Dept: UROLOGY | Facility: CLINIC | Age: 58
End: 2018-05-04
Payer: COMMERCIAL

## 2018-05-04 VITALS
HEART RATE: 106 BPM | SYSTOLIC BLOOD PRESSURE: 116 MMHG | BODY MASS INDEX: 33.25 KG/M2 | HEIGHT: 74 IN | DIASTOLIC BLOOD PRESSURE: 79 MMHG | WEIGHT: 259.06 LBS

## 2018-05-04 DIAGNOSIS — N52.31 ERECTILE DYSFUNCTION FOLLOWING RADICAL PROSTATECTOMY: ICD-10-CM

## 2018-05-04 DIAGNOSIS — E29.1 MALE HYPOGONADISM: Primary | ICD-10-CM

## 2018-05-04 DIAGNOSIS — C61 PROSTATE CANCER: ICD-10-CM

## 2018-05-04 DIAGNOSIS — R53.83 FATIGUE, UNSPECIFIED TYPE: ICD-10-CM

## 2018-05-04 PROCEDURE — 3078F DIAST BP <80 MM HG: CPT | Mod: CPTII,S$GLB,, | Performed by: NURSE PRACTITIONER

## 2018-05-04 PROCEDURE — 99214 OFFICE O/P EST MOD 30 MIN: CPT | Mod: S$GLB,,, | Performed by: NURSE PRACTITIONER

## 2018-05-04 PROCEDURE — 99999 PR PBB SHADOW E&M-EST. PATIENT-LVL III: CPT | Mod: PBBFAC,,, | Performed by: NURSE PRACTITIONER

## 2018-05-04 PROCEDURE — 3008F BODY MASS INDEX DOCD: CPT | Mod: CPTII,S$GLB,, | Performed by: NURSE PRACTITIONER

## 2018-05-04 PROCEDURE — 3074F SYST BP LT 130 MM HG: CPT | Mod: CPTII,S$GLB,, | Performed by: NURSE PRACTITIONER

## 2018-05-04 NOTE — PROGRESS NOTES
"CHIEF COMPLAINT:    Mr. Rios is a 57 y.o. male presenting for testosterone results.  PRESENTING ILLNESS:    Rickey Rios is a 57 y.o. male who presents for testosterone lab results. He is an established patient with [facility] but a new patient to me. His last clinic visit was with Dr. Burns 2/6/18.    9/8/17Procedures:  Procedure(s) (LRB):  ROBOTIC ASSISTED LAPAROSCOPIC PROSTATECTOMY (N/A)    Today he presents to clinic for testosterone lab result. He is scheduled with Dr. Goodwin on 5/30/18 for testopel. He reports he has urinary frequency, urgency, and nocturia x 1-2 per night. He denies CJ, urge incontinence, hematuria, dysuria or flank pain. FOS is good. He denies f/c/n/v. He c/o ED and takes sildenafil and Cialis, which does improve his ED. He was previously on TRT for fatigue and would like to resume TRT. He reports he does have fatigue and episodes of sweating and weakness, as if he "was going through menopause." Denies bone pain or weight loss.    Labs 4/30/18: T 320, PSA <0.01, H&H 15/46.7, Hepatic function panel WNL, Lipids- elevated cholesterol (will f/u with PCP)      REVIEW OF SYSTEMS:    Review of Systems    Constitutional: Negative for fever and chills.   HENT: Negative for hearing loss.   Eyes: Negative for visual disturbance.   Respiratory: Negative for shortness of breath.   Cardiovascular: Negative for chest pain.   Gastrointestinal: Negative for nausea, vomiting, and constipation.   Genitourinary:  + urgency, frequency, nocturia, ED. Negative for difficulty urinating, incontinence, dysuria, hematuria, intermittency, hesitancy, incomplete bladder emptying, and decreased urine volume.   Neurological: Negative for dizziness.   Hematological: Does not bruise/bleed easily.   Psychiatric/Behavioral: Negative for confusion.       PATIENT HISTORY:    Past Medical History:   Diagnosis Date    ADD (attention deficit disorder)     Anxiety     Arthritis     Diabetes mellitus, type 2     " "Dyslexia     H/O elbow surgery 10/2017    Hypertension     preventative     BLAIRE on CPAP     Prostate cancer 8/24/2017       Past Surgical History:   Procedure Laterality Date    BACK SURGERY      HEEL SPUR SURGERY      NECK SURGERY      PROSTATE SURGERY         Family History   Problem Relation Age of Onset    Pulmonary fibrosis Mother     Diabetes Father     Atrial fibrillation Brother        Social History     Social History    Marital status:      Spouse name: N/A    Number of children: 4    Years of education: N/A     Occupational History    federal employee      Social History Main Topics    Smoking status: Never Smoker    Smokeless tobacco: Never Used    Alcohol use No    Drug use: No    Sexual activity: Yes     Partners: Female     Other Topics Concern    Not on file     Social History Narrative    No narrative on file       Allergies:  Phenergan [promethazine]; Zofran [ondansetron hcl (pf)]; and Metformin    Medications:    Current Outpatient Prescriptions:     BD ULTRA-FINE BERNARDO PEN NEEDLES 32 gauge x 5/32" Ndle, , Disp: , Rfl:     buPROPion (WELLBUTRIN XL) 300 MG 24 hr tablet, , Disp: , Rfl:     FREESTYLE LANCETS 28 gauge lancets, , Disp: , Rfl:     FREESTYLE LITE STRIPS Strp, , Disp: , Rfl:     glimepiride (AMARYL) 4 MG tablet, , Disp: , Rfl:     indomethacin (INDOCIN SR) 75 mg CpSR CR capsule, , Disp: , Rfl:     insulin glargine, TOUJEO, (TOUJEO SOLOSTAR) 300 unit/mL (1.5 mL) InPn pen, Inject into the skin., Disp: , Rfl:     INVOKANA 300 mg Tab tablet, , Disp: , Rfl:     lorazepam (ATIVAN) 0.5 MG tablet, Take 0.5 mg by mouth every 6 (six) hours as needed for Anxiety., Disp: , Rfl:     pentoxifylline (TRENTAL) 400 mg TbSR, Take 1 tablet (400 mg total) by mouth 3 (three) times daily with meals., Disp: 90 tablet, Rfl: 5    polyethylene glycol (GLYCOLAX) 17 gram/dose powder, , Disp: , Rfl:     sildenafil (REVATIO) 20 mg Tab, Take 1 tablet (20 mg total) by mouth " continuous prn (Take 3 tablets, 1 hour prior to intercource, can take up to 5 tablets at once, but do not exceed 5 tablets.)., Disp: 30 tablet, Rfl: 11    simethicone (MYLICON) 80 MG chewable tablet, Take 1 tablet (80 mg total) by mouth 3 (three) times daily as needed for Flatulence., Disp: 30 tablet, Rfl: 2    STRATTERA 80 mg capsule, , Disp: , Rfl:     tadalafil (CIALIS) 5 MG tablet, Take 1 PO QOD for prostate cancer ED, Disp: 30 tablet, Rfl: 11    valsartan (DIOVAN) 160 MG tablet, , Disp: , Rfl:     Current Facility-Administered Medications:     testosterone Pllt 12 Pellet, 12 Pellet, Implant, 1 time in Clinic/HOD, Denis Goodwin MD    PHYSICAL EXAMINATION:  Constitutional: He is oriented to person, place, and time. He appears well-developed and well-nourished.  He is in no apparent distress.    Neck: No tracheal deviation present.     Cardiovascular: Tachycardia. Normal rhythm      Pulmonary/Chest: Effort normal. No respiratory distress.     Abdominal:  He exhibits no distension.  There is no CVA tenderness.     Lymphadenopathy:        Right: No supraclavicular adenopathy present.        Left: No supraclavicular adenopathy present.     Neurological: He is alert and oriented to person, place, and time.     Skin: Skin is warm and dry.     Extremities: No pitting edema noted in lower extremities bilaterally    Psych: Cooperative with normal affect.    Genitourinary: Prostate is surgically absent    Physical Exam      LABS:    Lab Results   Component Value Date    PSADIAG <0.01 04/30/2018    PSADIAG <0.01 02/05/2018    PSADIAG 0.02 10/05/2017         IMPRESSION:    Encounter Diagnoses   Name Primary?    Male hypogonadism Yes    Erectile dysfunction following radical prostatectomy     Prostate cancer     Fatigue, unspecified type        PLAN:    -Discussed plan of care with patient  -Appt scheduled for testopel with Dr. Goodwin  -Lab results personally reviewed and discussed with patient  -Discussed  treatment options for ED. He is not interested is PEP injections at this time and will continue taking oral medications.  -For LUTS: Avoid Bladder Irritants: Tea, coffee, caffeine, alcohol, artificial sweeteners, citrus, spicy foods, acidic foods,chocolate, tomato-based foods, smoking.   -RTC 5/30/18 as scheduled

## 2018-05-21 ENCOUNTER — OFFICE VISIT (OUTPATIENT)
Dept: UROLOGY | Facility: CLINIC | Age: 58
End: 2018-05-21
Payer: COMMERCIAL

## 2018-05-21 VITALS
HEART RATE: 95 BPM | SYSTOLIC BLOOD PRESSURE: 147 MMHG | HEIGHT: 74 IN | BODY MASS INDEX: 34.03 KG/M2 | DIASTOLIC BLOOD PRESSURE: 86 MMHG | WEIGHT: 265.19 LBS

## 2018-05-21 DIAGNOSIS — C61 PROSTATE CANCER: ICD-10-CM

## 2018-05-21 DIAGNOSIS — N52.31 ERECTILE DYSFUNCTION FOLLOWING RADICAL PROSTATECTOMY: ICD-10-CM

## 2018-05-21 DIAGNOSIS — Z79.4 TYPE 2 DIABETES MELLITUS WITHOUT COMPLICATION, WITH LONG-TERM CURRENT USE OF INSULIN: ICD-10-CM

## 2018-05-21 DIAGNOSIS — E29.1 MALE HYPOGONADISM: Primary | ICD-10-CM

## 2018-05-21 DIAGNOSIS — E11.9 TYPE 2 DIABETES MELLITUS WITHOUT COMPLICATION, WITH LONG-TERM CURRENT USE OF INSULIN: ICD-10-CM

## 2018-05-21 PROCEDURE — 11980 IMPLANT HORMONE PELLET(S): CPT | Mod: S$GLB,,, | Performed by: UROLOGY

## 2018-05-21 PROCEDURE — 3077F SYST BP >= 140 MM HG: CPT | Mod: CPTII,S$GLB,, | Performed by: UROLOGY

## 2018-05-21 PROCEDURE — 3008F BODY MASS INDEX DOCD: CPT | Mod: CPTII,S$GLB,, | Performed by: UROLOGY

## 2018-05-21 PROCEDURE — 3079F DIAST BP 80-89 MM HG: CPT | Mod: CPTII,S$GLB,, | Performed by: UROLOGY

## 2018-05-21 PROCEDURE — 99214 OFFICE O/P EST MOD 30 MIN: CPT | Mod: 25,S$GLB,, | Performed by: UROLOGY

## 2018-05-21 PROCEDURE — 99999 PR PBB SHADOW E&M-EST. PATIENT-LVL III: CPT | Mod: PBBFAC,,, | Performed by: UROLOGY

## 2018-05-21 PROCEDURE — S0189 TESTOSTERONE PELLET 75 MG: HCPCS | Mod: S$GLB,,, | Performed by: UROLOGY

## 2018-05-21 PROCEDURE — 3045F PR MOST RECENT HEMOGLOBIN A1C LEVEL 7.0-9.0%: CPT | Mod: CPTII,S$GLB,, | Performed by: UROLOGY

## 2018-05-21 RX ORDER — PAPAVERINE HYDROCHLORIDE 30 MG/ML
INJECTION INTRAMUSCULAR; INTRAVENOUS
Qty: 5 ML | Refills: 0 | Status: SHIPPED | OUTPATIENT
Start: 2018-05-21 | End: 2018-06-15 | Stop reason: ALTCHOICE

## 2018-05-21 NOTE — PROGRESS NOTES
Mr. Rios is a 57 y.o. male presenting with ED.    PRESENTING ILLNESS:    Rickey Rios is a 57 y.o. male s/p RALP on 9/8/2017 by Dr. Burns.  Since then, he c/o ED.  Prior to surgery, he did have ED mild ED and was on TRT due to fatigue.  He'd like to resume TRT.    He's been using Cialis for penile rehab and still hasn't gotten a good erection.    He was also told that he had peyronie's.  He noticed a curve to his penis > 1 year ago.  No difficulty with penetration due to the curve. He doesn't know the degree of curve as he hasn't had a good erection.    He does not have CJ.  He is wearing 0 pads/day.  No hematuria.  No dysuria.  Good FOS.    No bone pain or weight loss.    REVIEW OF SYSTEMS:    Rickey Rios denies any history of headache, blurred vision, fever, nausea, vomiting, chills, flank discomfort, abdominal pain, bleeding per rectum, cough, SOB, recent loss of consciousness, recent mental status changes, seizures, dizziness, or upper or lower extremity weakness.    COCO  1. 1  2. 1  3. 1  4. 0  5. 1      PATIENT HISTORY:    Past Medical History:   Diagnosis Date    ADD (attention deficit disorder)     Anxiety     Arthritis     Diabetes mellitus, type 2     Dyslexia     H/O elbow surgery 10/2017    Hypertension     preventative     BLAIRE on CPAP     Prostate cancer 8/24/2017       Family History   Problem Relation Age of Onset    Pulmonary fibrosis Mother     Diabetes Father     Atrial fibrillation Brother        Past Surgical History:   Procedure Laterality Date    BACK SURGERY      HEEL SPUR SURGERY      NECK SURGERY      PROSTATE SURGERY         Social History     Social History    Marital status:      Spouse name: N/A    Number of children: 4    Years of education: N/A     Occupational History    federal employee      Social History Main Topics    Smoking status: Never Smoker    Smokeless tobacco: Never Used    Alcohol use No    Drug use: No    Sexual  "activity: Yes     Partners: Female     Other Topics Concern    None     Social History Narrative    None       Review of patient's allergies indicates:   Allergen Reactions    Phenergan [promethazine] Anaphylaxis and Edema     Throat/mouth swelling    Zofran [ondansetron hcl (pf)] Anaphylaxis, Hives and Edema     Throat swelling & mouth    Metformin Nausea And Vomiting         Current Outpatient Prescriptions:     BD ULTRA-FINE BERNARDO PEN NEEDLES 32 gauge x 5/32" Ndle, , Disp: , Rfl:     buPROPion (WELLBUTRIN XL) 300 MG 24 hr tablet, , Disp: , Rfl:     FREESTYLE LANCETS 28 gauge lancets, , Disp: , Rfl:     FREESTYLE LITE STRIPS Strp, , Disp: , Rfl:     glimepiride (AMARYL) 4 MG tablet, , Disp: , Rfl:     indomethacin (INDOCIN SR) 75 mg CpSR CR capsule, , Disp: , Rfl:     insulin glargine, TOUJEO, (TOUJEO SOLOSTAR) 300 unit/mL (1.5 mL) InPn pen, Inject into the skin., Disp: , Rfl:     INVOKANA 300 mg Tab tablet, , Disp: , Rfl:     lorazepam (ATIVAN) 0.5 MG tablet, Take 0.5 mg by mouth every 6 (six) hours as needed for Anxiety., Disp: , Rfl:     papaverine 30 mg/mL injection, Add Phentolamine 1 mg/cc Add PGE1 10 mcg/cc  SIG: Bring to office for test dose in physician office, Disp: 5 mL, Rfl: 0    pentoxifylline (TRENTAL) 400 mg TbSR, Take 1 tablet (400 mg total) by mouth 3 (three) times daily with meals., Disp: 90 tablet, Rfl: 5    polyethylene glycol (GLYCOLAX) 17 gram/dose powder, , Disp: , Rfl:     sildenafil (REVATIO) 20 mg Tab, Take 1 tablet (20 mg total) by mouth continuous prn (Take 3 tablets, 1 hour prior to intercource, can take up to 5 tablets at once, but do not exceed 5 tablets.)., Disp: 30 tablet, Rfl: 11    simethicone (MYLICON) 80 MG chewable tablet, Take 1 tablet (80 mg total) by mouth 3 (three) times daily as needed for Flatulence., Disp: 30 tablet, Rfl: 2    STRATTERA 80 mg capsule, , Disp: , Rfl:     tadalafil (CIALIS) 5 MG tablet, Take 1 PO QOD for prostate cancer ED, Disp: 30 " tablet, Rfl: 11    valsartan (DIOVAN) 160 MG tablet, , Disp: , Rfl:     Current Facility-Administered Medications:     [START ON 10/28/2018] testosterone Pllt 12 Pellet, 12 Pellet, Implant, 1 time in Clinic/HOD, Denis Goodwin MD      PHYSICAL EXAMINATION:    The patient generally appears in good health, is appropriately interactive, and is in no apparent distress.     Eyes: anicteric sclerae, moist conjunctivae; no lid-lag; PERRLA     HENT: Atraumatic; oropharynx clear with moist mucous membranes and no mucosal ulcerations;normal hard and soft palate. No evidence of lymphadenopathy.    Neck: Trachea midline.  No thyromegaly.    Musculoskeletal: No abnormal gait.    Skin: No lesions.    Mental: Cooperative with normal affect.  Is oriented to time, place, and person.    Neuro: Grossly intact.    Chest: Normal inspiratory effort.   No accessory muscles.  No audible wheezes.  Respirations symmetric on inspiration and expiration.    Heart: Regular rhythm.      Abdomen:  Soft, non-tender. No masses or organomegaly. Bladder is not palpable. No evidence of flank discomfort. No evidence of inguinal hernia.    Genitourinary: The penis is circumcised with a plaque c/w peyronie's on the shaft. The urethral meatus is normal. The testes, epididymides, and cord structures are normal in size and contour bilaterally. The scrotum is normal in size and contour.    Extremities: No clubbing, cyanosis, or edema        LABS:      Lab Results   Component Value Date    PSADIAG <0.01 04/30/2018    PSADIAG <0.01 02/05/2018    PSADIAG 0.02 10/05/2017        IMPRESSION:    Encounter Diagnoses   Name Primary?    Male hypogonadism Yes    Erectile dysfunction following radical prostatectomy     Prostate cancer     Type 2 diabetes mellitus without complication, with long-term current use of insulin      DM, controlled  HTN, controlled    PLAN:    1. Discussed that TRT after RALP is controversial.  He's willing to accept the risk.   2.  Discussed options for his ED.  He'd like ICI. Side effects discussed.  A new Rx was given.  Discussed that this could worsen peyronie's in theory.  3. Procedure Report:Testopel Insertion    A Time Out was performed with the patient and nurse in the room.   The site was marked with a yes. Verbal consent was obtained. The risks and benefits of testosterone replacement were explained. The alternatives of observation and treatment with injections and gel therapy were discussed.     The risks of testopel explained to the patient include but are not limited to worsening of BPH, edema with or without congestive heart failure, gynecomastia, cellulitis and abscess, venous thromboembolic events, testicular atrophy, possible cardiac sequelae and with high dose testosterone the risk of liver function elevation (peliosis hepatitis) and hepatocellular carcinoma. Peliosis hepatitis can be a fatal complication.     R gluteal region prepped and draped in sterile fashion.  20 cc of 2% lidocaine used for local analgesia.  Small incision made with a 15 blade.  Standard trocars used for subcutaneous insertion of 12 pellets total in a V shaped tract.  No active bleeding noted.  Area cleaned and dried. Steri strips applied. Dressing applied.      He can RTC 6 months with T, psa, cbc, hepatic panel lipid panel.         Copy to:

## 2018-05-21 NOTE — PATIENT INSTRUCTIONS
Penile Self-Injection Procedure  Self-injection is a good option for many men with erectile dysfunction (ED). A tiny needle is used to inject medication into the penis. This helps your penis get hard and stay that way long enough for sex. And sex and orgasm will feel as good as always. You may be nervous about doing self-injection at first. But with practice, it will get easier. Your healthcare provider will show you how to do self-injection the first time. The simple steps are outlined on this sheet.  Preparing for Injection  · Wash your hands well with soap and water.  · Prepare the medication (if needed).  · Sit or  a comfortable position in a warm, well-lit room. If you need to, sit or  front of a mirror.  · Find an injection site on one side of your penis, in a place with no visible veins. (Dont inject into the top, bottom, or head of the penis.)  · Clean the injection site with an alcohol swab. Grasp the head of your penis firmly with your thumb and forefinger (dont just pinch the skin). Stretch the penis so the skin on the shaft is taut.  Injecting the Medication  · Rest your penis against your inner thigh and pull it gently toward your knee. Dont twist or rotate it. This way youll be sure to inject the medication into the spot you chose and cleaned before.  · Hold the syringe between your thumb and fingers, like youre holding a pen. Rest your forearm on your thigh for support.  · Insert the needle at a 90-degree angle (perpendicular) to the shaft. Do this quickly to reduce discomfort. (The needle should go in easily. If it doesnt, stop right away.)  · Move your thumb to the plunger. Press down to inject the medication, counting to 5.  · Remove the needle and dispose of it safely.     The injection site can be in any part of the shaded area.     Insert the needle straight into the penis.    Gaining an Erection  · Apply pressure to the injection site for a few minutes. This prevents  swelling and bruising and helps spread the medication.   · Stand up. This may help your erection develop. Foreplay often helps, too.  · Your penis should become firm within 10-20 minutes. The erection will last long enough for sex, and maybe longer.  Call Your Doctor If You Have:   · An erection that lasts longer than 3-4  hours  · Bleeding or bruising  · Severe pain  · Scarring or curvature of the penis       © 2493-1996 [facility], [address] [place] [address], [place] [address]. All rights reserved. This information is not intended as a substitute for professional medical care. Always follow your healthcare professional's instructions.

## 2018-06-13 DIAGNOSIS — R79.89 LOW TESTOSTERONE: ICD-10-CM

## 2018-06-13 DIAGNOSIS — N40.0 BENIGN PROSTATIC HYPERPLASIA: ICD-10-CM

## 2018-06-13 RX ORDER — SILDENAFIL CITRATE 20 MG/1
TABLET ORAL
Qty: 30 TABLET | Refills: 11 | Status: SHIPPED | OUTPATIENT
Start: 2018-06-13 | End: 2019-07-02 | Stop reason: SDUPTHER

## 2018-06-15 ENCOUNTER — OFFICE VISIT (OUTPATIENT)
Dept: UROLOGY | Facility: CLINIC | Age: 58
End: 2018-06-15
Payer: COMMERCIAL

## 2018-06-15 VITALS
BODY MASS INDEX: 36.64 KG/M2 | HEART RATE: 104 BPM | DIASTOLIC BLOOD PRESSURE: 89 MMHG | HEIGHT: 72 IN | WEIGHT: 270.5 LBS | SYSTOLIC BLOOD PRESSURE: 159 MMHG

## 2018-06-15 DIAGNOSIS — Z90.79 HISTORY OF RADICAL PROSTATECTOMY: ICD-10-CM

## 2018-06-15 DIAGNOSIS — C61 PROSTATE CANCER: Primary | ICD-10-CM

## 2018-06-15 DIAGNOSIS — N52.31 ERECTILE DYSFUNCTION AFTER RADICAL PROSTATECTOMY: ICD-10-CM

## 2018-06-15 DIAGNOSIS — N52.1 TYPE 2 DIABETES MELLITUS WITH CIRCULATORY DISORDER CAUSING ERECTILE DYSFUNCTION: ICD-10-CM

## 2018-06-15 DIAGNOSIS — E11.59 TYPE 2 DIABETES MELLITUS WITH CIRCULATORY DISORDER CAUSING ERECTILE DYSFUNCTION: ICD-10-CM

## 2018-06-15 PROCEDURE — 54235 NJX CORPORA CAVERNOSA RX AGT: CPT | Mod: S$GLB,,, | Performed by: NURSE PRACTITIONER

## 2018-06-15 PROCEDURE — 3008F BODY MASS INDEX DOCD: CPT | Mod: CPTII,S$GLB,, | Performed by: NURSE PRACTITIONER

## 2018-06-15 PROCEDURE — 3079F DIAST BP 80-89 MM HG: CPT | Mod: CPTII,S$GLB,, | Performed by: NURSE PRACTITIONER

## 2018-06-15 PROCEDURE — 99214 OFFICE O/P EST MOD 30 MIN: CPT | Mod: 25,S$GLB,, | Performed by: NURSE PRACTITIONER

## 2018-06-15 PROCEDURE — 99999 PR PBB SHADOW E&M-EST. PATIENT-LVL IV: CPT | Mod: PBBFAC,,, | Performed by: NURSE PRACTITIONER

## 2018-06-15 PROCEDURE — 3077F SYST BP >= 140 MM HG: CPT | Mod: CPTII,S$GLB,, | Performed by: NURSE PRACTITIONER

## 2018-06-15 PROCEDURE — 3045F PR MOST RECENT HEMOGLOBIN A1C LEVEL 7.0-9.0%: CPT | Mod: CPTII,S$GLB,, | Performed by: NURSE PRACTITIONER

## 2018-06-15 RX ORDER — PAPAVERINE HYDROCHLORIDE 30 MG/ML
INJECTION INTRAMUSCULAR; INTRAVENOUS
Qty: 10 ML | Refills: 11 | Status: SHIPPED | OUTPATIENT
Start: 2018-06-15 | End: 2019-12-23 | Stop reason: ALTCHOICE

## 2018-06-15 RX ORDER — SYRINGE,SAFETY WITH NEEDLE,1ML 25GX1"
SYRINGE (EA) MISCELLANEOUS
Qty: 10 EACH | Refills: 12 | Status: SHIPPED | OUTPATIENT
Start: 2018-06-15

## 2018-06-15 NOTE — PROGRESS NOTES
Subjective:       Patient ID: Rickey Rios is a 57 y.o. male.    Chief Complaint: Erectile Dysfunction (ICI Therapy) and Prostate Cancer (s/p RALP)    Rickey Rios is a 57 y.o. male s/p RALP on 09/08/2017 by Dr. Burns.    [place] 6 (3+3); (-) extension, margins, & SV; pT2, pN0, pMx  No bone pain or weight loss.    Last clinic visit was with Dr. Goodwin on 5/21/2018.  Since RALP, he c/o ED.    Prior to surgery, he did have ED mild ED and was on TRT due to fatigue.    He was also told that he had peyronie's.  He noticed a curve to his penis > one year ago.  No difficulty with penetration due to the curve.  He doesn't know the degree of curve as he hasn't had a good erection.    He is here today for ICI education and 1st injection.  He aware of risks in theory of potential worsening peyronie;s    He brought in his own medications.                          PSA                  <0.01               04/30/2018                  Past Medical History:  No date: ADD (attention deficit disorder)  No date: Anxiety  No date: Arthritis  No date: Diabetes mellitus, type 2  No date: Dyslexia  10/2017: H/O elbow surgery  No date: Hypertension      Comment: preventative   No date: BLAIRE on CPAP  8/24/2017: Prostate cancer    Past Surgical History:  No date: BACK SURGERY  No date: HEEL SPUR SURGERY  No date: NECK SURGERY  No date: PROSTATE SURGERY    Review of patient's family history indicates:  Problem: Pulmonary fibrosis      Relation: Mother       Age of Onset: (Not Specified)   Problem: Diabetes      Relation: Father       Age of Onset: (Not Specified)   Problem: Atrial fibrillation      Relation: Brother       Age of Onset: (Not Specified)       Social History    Marital status:              Spouse name:                       Years of education:                 Number of children: 4             Occupational History  Occupation          Employer            Comment               federal employee                 "            Social History Main Topics    Smoking status: Never Smoker                                                                Smokeless tobacco: Never Used                        Alcohol use: No              Drug use: No              Sexual activity: Yes               Partners with: Female    Other Topics            Concern    None on file    Social History Narrative    None on file        Allergies:  Phenergan (promethazine); Zofran (ondansetron hcl (pf)); and Metformin    Medications:  Current Outpatient Prescriptions:   BD ULTRA-FINE BERNARDO PEN NEEDLES 32 gauge x 5/32" Ndle, , Disp: , Rfl:   buPROPion (WELLBUTRIN XL) 300 MG 24 hr tablet, , Disp: , Rfl:   FREESTYLE LANCETS 28 gauge lancets, , Disp: , Rfl:   FREESTYLE LITE STRIPS Strp, , Disp: , Rfl:   glimepiride (AMARYL) 4 MG tablet, , Disp: , Rfl:   indomethacin (INDOCIN SR) 75 mg CpSR CR capsule, , Disp: , Rfl:   insulin glargine, TOUJEO, (TOUJEO SOLOSTAR) 300 unit/mL (1.5 mL) InPn pen, Inject into the skin., Disp: , Rfl:   INVOKANA 300 mg Tab tablet, , Disp: , Rfl:   lorazepam (ATIVAN) 0.5 MG tablet, Take 0.5 mg by mouth every 6 (six) hours as needed for Anxiety., Disp: , Rfl:   papaverine 30 mg/mL injection, Add Phentolamine 1 mg/cc Add PGE1 10 mcg/cc  SIG: Bring to office for test dose in physician office, Disp: 5 mL, Rfl: 0  pentoxifylline (TRENTAL) 400 mg TbSR, Take 1 tablet (400 mg total) by mouth 3 (three) times daily with meals., Disp: 90 tablet, Rfl: 5  polyethylene glycol (GLYCOLAX) 17 gram/dose powder, , Disp: , Rfl:   sildenafil (REVATIO) 20 mg Tab, TAKE 1-3 TABLETS 30 MINUTES PRIOR TO INTERCOURSE. MAX OF 5., Disp: 30 tablet, Rfl: 11  simethicone (MYLICON) 80 MG chewable tablet, Take 1 tablet (80 mg total) by mouth 3 (three) times daily as needed for Flatulence., Disp: 30 tablet, Rfl: 2  STRATTERA 80 mg capsule, , Disp: , Rfl:   tadalafil (CIALIS) 5 MG tablet, Take 1 PO QOD for prostate cancer ED, Disp: 30 tablet, Rfl: 11  " valsartan (DIOVAN) 160 MG tablet, , Disp: , Rfl:   Current Facility-Administered Medications:   (START ON 10/28/2018) testosterone Pllt 12 Pellet, 12 Pellet, Implant, 1 time in Clinic/HOD, Denis Goodwin MD        Review of Systems   Constitutional: Negative for activity change, appetite change, chills and fever.   HENT: Negative for facial swelling and trouble swallowing.    Eyes: Negative for visual disturbance.   Respiratory: Negative for chest tightness and shortness of breath.    Cardiovascular: Negative for chest pain and palpitations.   Gastrointestinal: Negative.  Negative for abdominal pain, constipation, diarrhea, nausea and vomiting.   Genitourinary: Negative for difficulty urinating, dysuria, flank pain, hematuria, penile pain, penile swelling, scrotal swelling and testicular pain.        He does not have CJ.  He is wearing 0 pads/day.  No hematuria.  No dysuria.  Good FOS.   Musculoskeletal: Negative for back pain, gait problem, myalgias and neck stiffness.   Skin: Negative for rash.   Neurological: Negative for dizziness and speech difficulty.   Hematological: Does not bruise/bleed easily.   Psychiatric/Behavioral: Negative for behavioral problems.       Objective:      Physical Exam   Nursing note and vitals reviewed.  Constitutional: He is oriented to person, place, and time. Vital signs are normal. He appears well-developed and well-nourished. He is active and cooperative.  Non-toxic appearance. He does not have a sickly appearance.   HENT:   Head: Normocephalic and atraumatic.   Right Ear: External ear normal.   Left Ear: External ear normal.   Nose: Nose normal.   Mouth/Throat: Mucous membranes are normal.   Eyes: Conjunctivae and lids are normal. No scleral icterus.   Neck: Trachea normal, normal range of motion and full passive range of motion without pain. Neck supple. No JVD present. No tracheal deviation present.   Cardiovascular: Normal rate, S1 normal and S2 normal.    Pulmonary/Chest:  Effort normal. No respiratory distress. He exhibits no tenderness.   Abdominal: Soft. Normal appearance and bowel sounds are normal. There is no hepatosplenomegaly. There is no tenderness. There is no CVA tenderness.   Genitourinary: Testes normal and penis normal. Circumcised. No penile tenderness.       Musculoskeletal: Normal range of motion.   Neurological: He is alert and oriented to person, place, and time. He has normal strength.   Skin: Skin is warm, dry and intact.     Psychiatric: He has a normal mood and affect. His behavior is normal. Judgment and thought content normal.       Assessment:       1. Prostate cancer    2. History of radical prostatectomy    3. Erectile dysfunction after radical prostatectomy    4. Type 2 diabetes mellitus with circulatory disorder causing erectile dysfunction        Plan:         I spent 40 minutes with the patient of which more than half was spent in direct consultation with the patient in regards to our treatment and plan.    Education and recommendations of today's plan of care including home remedies.  We discussed ED and the contributing factors. We reviewed his personal factors that contribute to ED. Patient was educated on ED treatments. We discussed PDE-5 inhibitors, ARJUN, Muse, and IPP.    He is here today for the Intracorporal injection/ICI education and first injection.  Educational materials were supplied.    ICI is an injectable medication that consists of three different medications to produce an erection. It is known as a Trimix Compound or PEP. The medication is a compound medication and is only mixed up at certain pharmacies known as a compound pharmacy. The medication has to be stored in the refrigerator. Improper storage decreases the effectiveness of the medication.     He was educated that it is an injection. With any injection there is risk for possible pain at the injection site as well as risk for an infection. Clean technique must be followed to help  prevent infection. Proper needle disposable is necessary. He voices understanding.    The injection is best given when standing up and the penis is positioned perpendicular to the body. The urethral opening should be facing away from the body. Injection is always given on the lateral or side of the penis. Never give the injection to the top of the penis to avoid possible trauma to arteries and veins. Never give the injection to the bottom of the penis to avoid trauma to the urethra. He should alternate the injection sites to avoid the build up of scar tissue due to multiple injections.    This medication can increase the risk of erections lasting longer than 4 hours. This is known as a priapism and is a medical emergency. This requires immediate medical attention. This is main reason we give the first dose in the office today. We start at low dose and see how well the patients reacts. We can increase the medication if needed depending on the reaction the patient receives today. We discussed the fine line between enough medication for penetration and too much leading to a priapism. He voices understanding.    He witnessed me draw up 5 units and I injected him in the left lateral aspect of the penis. Within < 5 minutes, he achieved an erection firm enough for intercourse. There was no pain/discomfort; Very minimal curvature to right.   He was pleased with the reaction.   He was instructed to keep the dosage at 5 units. If noticing a decrease in reaction he can increase the dosage by 5 units or 0.05ml. He may also refill the Rx.     If have any questions, please give me a call. Educational materials were given to patient and all questions were answered. He voiced understanding and left the office without a priapism.

## 2018-06-15 NOTE — PATIENT INSTRUCTIONS
Understanding Erectile Dysfunction    Erectile dysfunction (ED) is a problem getting an erection firm enough or keeping it long enough for intercourse. The problem can happen to any man at any age. But health problems that can lead to ED become more common as a man ages. Up to half of men over age 40 experience ED at some point.  Causes of ED  ED can have many causes. Most are physical. Some are emotional issues. Often, a combination of causes is involved. Causes of ED may include:  · Medical conditions such as diabetes or depression  · Smoking tobacco or marijuana  · Drinking too much alcohol  · Side effects of medications  · Injury to nerves or blood vessels  · Emotional issues such as stress or relationship problems  ED can be treated  Prescription medications for ED are available. They help many men who try them. Depending upon the cause of the ED, though, medications may not be enough. In these cases, other treatment options are available. These include erectile aids and surgery. Your health care provider can tell you more about the treatment that is right for you. And new treatments for ED are being studied. No matter what the treatment you decide on, stay in touch with your doctor. If your symptoms persist, he or she may be able to adjust your current treatment or try something new.  Date Last Reviewed: 1/1/2017  © 3130-9084 [employer]. [address] [place] [address], [place] [address]. All rights reserved. This information is not intended as a substitute for professional medical care. Always follow your healthcare professional's instructions.        Penile Self-Injection: Notes and Precautions     Man and healthcare provider sitting across from one another, talking.   Penile self-injection is a simple technique that may improve your sex life. Some men even find that self-injection leads to an increase in natural erections. If you have questions or concerns about self-injection or erectile  dysfunction (ED), talk with your healthcare provider. The information on this sheet will help you get the best results.  Notes about penile self-injection  · You may feel a mild burning during injection. This is OK. But if you feel pressure or severe pain, stop the injection. There may be a problem with the injection site.  · Only inject the medicine on the side of your penis. It may not work if injected elsewhere.  · To prevent scarring, inject in a different spot each time.  · Dont use this treatment if you have a bleeding disorder or any risk of infection.  · Get medical help right away if your erection lasts longer than 3 to 4 hours.  Work with your healthcare provider  Ask how often you can safely repeat injections, as well as any other questions you have. You and your healthcare provider will talk about follow-up exams and how to get supplies. If the medicine doesnt work or stops working over time, tell your healthcare provider.     When to call your healthcare provider  Call your healthcare provider right away if any of these occur:  · An erection that lasts longer than 3 to 4  hours  · Bleeding or bruising  · Severe pain  · Scarring or curvature of the penis   Date Last Reviewed: 1/1/2017  © 9727-9806 [employer]. [address] [place] [address]. All rights reserved. This information is not intended as a substitute for professional medical care. Always follow your healthcare professional's instructions.        Penile Self-Injection Procedure  Self-injection is a good option if you have erectile dysfunction (ED). You insert a tiny needle into your penis and inject a medicine. This helps your penis get hard and stay that way long enough for sex. And sex and orgasm will feel as good as always. You may be nervous about doing self-injection at first. But with practice, it will get easier. Your healthcare provider will show you how to do self-injection the first time.  Talk to your doctor  about any medicines you take and any medical problems you have.      Preparing for injection  · Wash your hands well with soap and water.  · Prepare the medicine (if needed).  · Sit or  a comfortable position in a warm, well-lit room. If you need to, sit or  front of a mirror.  · Find an injection site on one side of your penis, in a place with no visible veins. (Dont inject into the top, bottom, or head of the penis.)  · Clean the injection site with an alcohol swab. Grasp the head of your penis firmly with your thumb and forefinger (dont just pinch the skin). Stretch the penis so the skin on the shaft is taut.  Injecting the medicine  · Rest your penis against your inner thigh and pull it gently toward your knee. Dont twist or rotate it. This way youll be sure to inject the medicine into the spot you chose and cleaned before.  · Hold the syringe between your thumb and fingers, like youre holding a pen. Rest your forearm on your thigh for support.  · Insert the needle at a 90° angle (perpendicular) to the shaft. Do this quickly to reduce discomfort. (The needle should go in easily. If it doesnt, stop right away.)  · Move your thumb to the plunger. Press down to inject the medicine, counting to 5.  · Remove the needle and dispose of it safely.  Gaining an erection  · Apply pressure to the injection site for a few minutes. This prevents swelling and bruising and helps spread the medicine.   · Stand up. This may help your erection develop. Foreplay often helps, too.  · Your penis should become firm within 10 to 20 minutes. The erection will last long enough for sex, and maybe longer.  When to seek medical care  An erection that lasts longer than 3 to 4  hours  · Bleeding or bruising  · Severe pain  · Scarring or curvature of the penis   Date Last Reviewed: 1/7/2017  © 3768-5771 [email or web address]. [address] [place] [address], [place] [address]. All rights reserved. This information is not  intended as a substitute for professional medical care. Always follow your healthcare professional's instructions.

## 2018-11-08 ENCOUNTER — TELEPHONE (OUTPATIENT)
Dept: UROLOGY | Facility: CLINIC | Age: 58
End: 2018-11-08

## 2018-12-05 ENCOUNTER — OFFICE VISIT (OUTPATIENT)
Dept: UROLOGY | Facility: CLINIC | Age: 58
End: 2018-12-05
Payer: COMMERCIAL

## 2018-12-05 VITALS
SYSTOLIC BLOOD PRESSURE: 146 MMHG | DIASTOLIC BLOOD PRESSURE: 92 MMHG | HEART RATE: 88 BPM | BODY MASS INDEX: 34.52 KG/M2 | HEIGHT: 74 IN | WEIGHT: 268.94 LBS

## 2018-12-05 DIAGNOSIS — N52.31 ERECTILE DYSFUNCTION FOLLOWING RADICAL PROSTATECTOMY: ICD-10-CM

## 2018-12-05 DIAGNOSIS — I10 ESSENTIAL HYPERTENSION: ICD-10-CM

## 2018-12-05 DIAGNOSIS — N48.6 PEYRONIE'S DISEASE: ICD-10-CM

## 2018-12-05 DIAGNOSIS — E11.9 TYPE 2 DIABETES MELLITUS WITHOUT COMPLICATION, WITH LONG-TERM CURRENT USE OF INSULIN: ICD-10-CM

## 2018-12-05 DIAGNOSIS — E29.1 MALE HYPOGONADISM: Primary | ICD-10-CM

## 2018-12-05 DIAGNOSIS — C61 PROSTATE CANCER: ICD-10-CM

## 2018-12-05 DIAGNOSIS — Z79.4 TYPE 2 DIABETES MELLITUS WITHOUT COMPLICATION, WITH LONG-TERM CURRENT USE OF INSULIN: ICD-10-CM

## 2018-12-05 PROCEDURE — 3077F SYST BP >= 140 MM HG: CPT | Mod: CPTII,S$GLB,, | Performed by: UROLOGY

## 2018-12-05 PROCEDURE — 3080F DIAST BP >= 90 MM HG: CPT | Mod: CPTII,S$GLB,, | Performed by: UROLOGY

## 2018-12-05 PROCEDURE — 99214 OFFICE O/P EST MOD 30 MIN: CPT | Mod: 25,S$GLB,, | Performed by: UROLOGY

## 2018-12-05 PROCEDURE — 11980 IMPLANT HORMONE PELLET(S): CPT | Mod: S$GLB,,, | Performed by: UROLOGY

## 2018-12-05 PROCEDURE — 3008F BODY MASS INDEX DOCD: CPT | Mod: CPTII,S$GLB,, | Performed by: UROLOGY

## 2018-12-05 PROCEDURE — S0189 TESTOSTERONE PELLET 75 MG: HCPCS | Mod: S$GLB,,, | Performed by: UROLOGY

## 2018-12-05 PROCEDURE — 99999 PR PBB SHADOW E&M-EST. PATIENT-LVL III: CPT | Mod: PBBFAC,,, | Performed by: UROLOGY

## 2018-12-05 NOTE — PROGRESS NOTES
Mr. Rios is a 58 y.o. male presenting with ED.    PRESENTING ILLNESS:    Rickey Rios is a 58 y.o. male s/p RALP on 9/8/2017 by Dr. Burns.  Since then, he c/o ED.  Prior to surgery, he did have ED mild ED and was on TRT due to fatigue.  He's on testopel currently.  He understands that this is controversial.    He's tried and failed oral meds and ICI for  His ED.    He was also told that he had peyronie's.  He noticed a curve to his penis > 1 year ago.  No difficulty with penetration due to the curve. He doesn't know the degree of curve as he hasn't had a good erection.    He does not have CJ.  He is wearing 0 pads/day.  No hematuria.  No dysuria.  Good FOS.    No bone pain or weight loss.    REVIEW OF SYSTEMS:    Rickey Rios denies any history of headache, blurred vision, fever, nausea, vomiting, chills, flank discomfort, abdominal pain, bleeding per rectum, cough, SOB, recent loss of consciousness, recent mental status changes, seizures, dizziness, or upper or lower extremity weakness.    COCO  1. 1  2. 0  3. 0  4. 1  5. 1      PATIENT HISTORY:    Past Medical History:   Diagnosis Date    ADD (attention deficit disorder)     Anxiety     Arthritis     Diabetes mellitus, type 2     Dyslexia     H/O elbow surgery 10/2017    Hypertension     preventative     BLAIRE on CPAP     Prostate cancer 8/24/2017       Family History   Problem Relation Age of Onset    Pulmonary fibrosis Mother     Diabetes Father     Atrial fibrillation Brother        Past Surgical History:   Procedure Laterality Date    BACK SURGERY      HEEL SPUR SURGERY      NECK SURGERY      PROSTATE SURGERY      ROBOTIC ASSISTED LAPAROSCOPIC PROSTATECTOMY N/A 9/8/2017    Performed by Tariq Burns MD at [facility] OR 2ND FLR    TRANSRECTAL ULTRASOUND GUIDED PROSTATE BIOPSY N/A 8/1/2017    Performed by TORITO Tobias [facility] at [facility] [place]       Social History     Socioeconomic History    Marital status:      Spouse  "name: None    Number of children: 4    Years of education: None    Highest education level: None   Social Needs    Financial resource strain: None    Food insecurity - worry: None    Food insecurity - inability: None    Transportation needs - medical: None    Transportation needs - non-medical: None   Occupational History    Occupation: [employer] employee   Tobacco Use    Smoking status: Never Smoker    Smokeless tobacco: Never Used   Substance and Sexual Activity    Alcohol use: No    Drug use: No    Sexual activity: Yes     Partners: Female   Other Topics Concern    None   Social History Narrative    None       Review of patient's allergies indicates:   Allergen Reactions    Phenergan [promethazine] Anaphylaxis and Edema     Throat/mouth swelling    Zofran [ondansetron hcl (pf)] Anaphylaxis, Hives and Edema     Throat swelling & mouth    Metformin Nausea And Vomiting         Current Outpatient Medications:     BD ULTRA-FINE BERNARDO PEN NEEDLES 32 gauge x 5/32" Ndle, , Disp: , Rfl:     buPROPion (WELLBUTRIN XL) 300 MG 24 hr tablet, , Disp: , Rfl:     FREESTYLE LANCETS 28 gauge lancets, , Disp: , Rfl:     FREESTYLE LITE STRIPS Strp, , Disp: , Rfl:     glimepiride (AMARYL) 4 MG tablet, , Disp: , Rfl:     indomethacin (INDOCIN SR) 75 mg CpSR CR capsule, , Disp: , Rfl:     insulin glargine, TOUJEO, (TOUJEO SOLOSTAR) 300 unit/mL (1.5 mL) InPn pen, Inject into the skin., Disp: , Rfl:     insulin syringe-needle U-100 1 mL 31 gauge x 5/16 Syrg, Use as directed with ICI Therapy, Disp: 10 each, Rfl: 12    INVOKANA 300 mg Tab tablet, , Disp: , Rfl:     lorazepam (ATIVAN) 0.5 MG tablet, Take 0.5 mg by mouth every 6 (six) hours as needed for Anxiety., Disp: , Rfl:     papaverine 30 mg/mL injection, Add: Phentolamine 10 mg Add: PGE1 100 mcg  Sig:  Inject 10 units (0.10 mls) as directed, Disp: 10 mL, Rfl: 11    sildenafil (REVATIO) 20 mg Tab, TAKE 1-3 TABLETS 30 MINUTES PRIOR TO INTERCOURSE. MAX OF 5., " Disp: 30 tablet, Rfl: 11    STRATTERA 80 mg capsule, , Disp: , Rfl:     valsartan (DIOVAN) 160 MG tablet, , Disp: , Rfl:     pentoxifylline (TRENTAL) 400 mg TbSR, Take 1 tablet (400 mg total) by mouth 3 (three) times daily with meals., Disp: 90 tablet, Rfl: 5    polyethylene glycol (GLYCOLAX) 17 gram/dose powder, , Disp: , Rfl:     tadalafil (CIALIS) 5 MG tablet, Take 1 PO QOD for prostate cancer ED, Disp: 30 tablet, Rfl: 11    Current Facility-Administered Medications:     [START ON 5/14/2019] testosterone Pllt 12 Pellet, 12 Pellet, Implant, 1 time in Clinic/HOD, Denis Goodwin MD      PHYSICAL EXAMINATION:    The patient generally appears in good health, is appropriately interactive, and is in no apparent distress.     Eyes: anicteric sclerae, moist conjunctivae; no lid-lag; PERRLA     HENT: Atraumatic; oropharynx clear with moist mucous membranes and no mucosal ulcerations;normal hard and soft palate. No evidence of lymphadenopathy.    Neck: Trachea midline.  No thyromegaly.    Musculoskeletal: No abnormal gait.    Skin: No lesions.    Mental: Cooperative with normal affect.  Is oriented to time, place, and person.    Neuro: Grossly intact.    Chest: Normal inspiratory effort.   No accessory muscles.  No audible wheezes.  Respirations symmetric on inspiration and expiration.    Heart: Regular rhythm.      Abdomen:  Soft, non-tender. No masses or organomegaly. Bladder is not palpable. No evidence of flank discomfort. No evidence of inguinal hernia.    Genitourinary: The penis is circumcised with a plaque c/w peyronie's on the shaft. The urethral meatus is normal. The testes, epididymides, and cord structures are normal in size and contour bilaterally. The scrotum is normal in size and contour.    Extremities: No clubbing, cyanosis, or edema        LABS:      Lab Results   Component Value Date    PSADIAG <0.01 11/07/2018    PSADIAG <0.01 04/30/2018    PSADIAG <0.01 02/05/2018        IMPRESSION:    Encounter  Diagnoses   Name Primary?    Male hypogonadism Yes    Erectile dysfunction following radical prostatectomy     Prostate cancer     Essential hypertension     Peyronie's disease     Type 2 diabetes mellitus without complication, with long-term current use of insulin      DM, controlled  HTN, controlled    PLAN:    1. Discussed that TRT after RALP is controversial.  He's willing to accept the risk.   2. Discussed options for his ED.  He'd like an IPP.  His A1c is too high currently.  Will recheck in 3 months.  3. Patient read the IPP handout and understands the risks associated with this procedure. He knows the risk of infection as well as surgery requirements if it were to become infected. He understands the impact on spontaneous and nocturnal erections, as well as need for replacement and repair, which was clearly outlined in verbal and written form. He was given the chance to ask questions and alternatives were discussed.  4. Procedure Report:Testopel Insertion    A Time Out was performed with the patient and nurse in the room.   The site was marked with a yes. Verbal consent was obtained. The risks and benefits of testosterone replacement were explained. The alternatives of observation and treatment with injections and gel therapy were discussed.     The risks of testopel explained to the patient include but are not limited to worsening of BPH, edema with or without congestive heart failure, gynecomastia, cellulitis and abscess, venous thromboembolic events, testicular atrophy, possible cardiac sequelae and with high dose testosterone the risk of liver function elevation (peliosis hepatitis) and hepatocellular carcinoma. Peliosis hepatitis can be a fatal complication.     L gluteal region prepped and draped in sterile fashion.  20 cc of 2% lidocaine used for local analgesia.  Small incision made with a 15 blade.  Standard trocars used for subcutaneous insertion of 12 pellets total in a V shaped tract.  No  active bleeding noted.  Area cleaned and dried. Steri strips applied. Dressing applied.      He can RTC 6 months with T, psa, cbc, hepatic panel lipid panel.     Risks for IPP discussed You have elected to undergo placement of a penile prosthesis. Several devices are currently available in the marketplace, including those which are non-inflatable, versus two- or three-piece inflatable prostheses. All of these devices have the capacity to give you the opportunity to have a rigid penis on demand and to be used as frequently and as long as you would like. There are, therefore, many advantages to the use of the prosthesis which you have already discussed with your physician. The goal of this informed consent form is to identify the potential problems that have been recognized to occur with penile prosthesis placement and that you understand the risks of undergoing prosthetic placement. It is important to recognize that as a result of improvements in design as well as better surgical technique, that all of the risks listed below are less than they were even 10 years ago. It is also important to recognize that most of the available studies report on historical data for devices which are now either not manufactured or have undergone structural improvements to reduce those side effects. The complications are simply noted in random order and do not reflect their frequency.    1. Prosthesis mechanical failure occurs as a result of leakage of fluid from the inflatable types of devices. This typically occurs as a result of a fracture in the tubing, most commonly as it emerges out of the scrotal pump, but it can also be due to a leak from the erectile cylinders in the penis. It is felt that this occurs as a result of repetitive mechanical trauma, which weakens the tubing and causes it to crack. When the fluid leaks, it is not something you would be aware of. It does not cause pain. The fluid contained within the device is  "typically a sterile saline solution that will simply be reabsorbed by the body. What you will recognize is that when you squeeze the pump, there will be no transfer of fluid and no rigidity or inadequate rigidity. The most recent long-term data looking at 5-10 year success reports mechanical failure in the 5-10% range over that period of time. Looked at another way, 90-95% of men will have a functional prosthesis in 10 years. These devices are designed to be used for life. Each company has its own warrantee which you should discuss with your physician.    2. Infection is a disastrous complication which usually requires the removal of the prosthesis, as it is rare to be able to clear infection so long as the prosthesis is within the body. Occasionally, the infected prosthesis can be removed, the location of the device can be washed out vigorously with a special antibiotic salvage procedure and then a new device may be able to be immediately placed. When this is not possible, the infected device is removed and then a period of healing will follow where the device can be replaced after a period of healing (6 weeks to 6 months). Delayed replacement of a prosthesis after initial removal is a more complicated operation associated with the potential for not being able to replace the device because of scarring but other problems such as shortening of the penis or change in sensation and shape may also occur.    As a result of improved surgical technique and device design, infection rates are now markedly reduced, typically being reported in the <1-2% range for new prosthesis placements and up to 3% when the prosthesis is uninfected and has mechanically failed.    3. Bleeding and hematoma are rarely a problem. There is likely to be localized swelling as well as "black and blue" of the penis, groin area, and scrotal sack. These will resolve without treatment over 1-3 weeks. Rarely a scrotal hematoma (collection of blood) will " develop which can be treated with rest; it will reabsorb with time or, if it is growing in size or painful, it may need to be evacuated surgically (opened up and washed out). The risk of needing a blood transfusion after penile prosthesis placement is extremely rare to nonexistent.    4. Post-operative pain is variable and can be minimal, but in most men it is quite significant. Your physician will provide you with adequate pain medication to help control the pain, but not necessarily eliminate it entirely. As the prosthesis heals, there will be complete resolution of the pain, such that you will typically not even be aware that the prosthesis is within your body unless you were to touch it directly. Complete pain resolution will most commonly occur within 4-12 weeks postoperatively. Post-operative pain is typically managed with oral narcotic agents. You should be aware that these drugs can cause constipation as well as sleepiness; therefore, you should not be in any position where you might need to make important decision or driving until the pain is under better control without the need for narcotic pain killers. It is recommended that during the first several days after the operation, that you spend as little time as possible on your feet, as this will encourage healing, reduce swelling, and result in more rapid resolution of pain.    5. Loss of length -  Penile shortening is probably the reason that most men are disappointed with the outcome from their prosthesis surgery. Studies have shown that when the flaccid penile stretched length is measured preoperatively, that the actual loss of length following placement of the prosthesis is on average no more than one centimeter (1/3 inch). To reduce the likelihood of loss of length, the surgeon will do his best to place the proper size device that fits your penis. You can expect that the length of your penis will be much like what you see when you grab the head of the  penis and pull it straight out away from your body. Many men who believe they have lost length in their penis following prosthesis surgery in fact did not really lose length because of the surgery, but rather because they have not had had a full erection for some time during which there may have been some loss of tissue elasticity and thereby shortening of the penis. In addition, they may have gained some weight in the pubic area which will cover the penis and make it look shorter. Lastly, they may be expecting that the postoperative result will be like the erections they had when they were a much younger man. Although the goal is to make the penis as long as possible, one can expect that the rigidity of the penis will be much like the erections obtained as a younger man.    6. Decreased girth - Girth is typically not substantially changed as most cylinders can expand and fill the penis satisfactorily, but if there has been scarring within the tissues of the penis, this can prevent expansion and result in a narrower appearing penis. The surgeon will do his best to put the largest cylinder in the penis, but there are limitations to which the tissues can expand. Decreased girth is not a common complaint.    7. Sensory loss - By and large the surgical techniques being used to avoid injury to the sensory nerves of the penis. Therefore, there is rarely any significant change in the sexual sensation of the penis. Some men find that it takes longer for them to experience orgasm. This may occur because they can simply inflate the penis without any sexuall arousal, and then it will seem to take longer for them to become aroused, resulting in the prolonged time to orgasm. Therefore, proper sexual stimulation is important to enjoy the entire sexual experience with a prosthesis.    8. Change in shape - The overall shape or configuration of the penis is rarely altered as a result of placement of any type of prosthesis, but if  there is some unidentified scarring involving the penis such as that which occurs with Peyronie's disease, some curvature or indentations including hourglass narrowing can be identified along the shaft. Typically, in the postoperative period, the prosthesis will cause an internal tissue expansion which will correct these deformities. This may take 6-9 months occur.    9. Erosion or cylinder extrusion - If the tissues in the tips of the penis are weakened either by previous internal penile disease or as a result of the surgery, the prosthetic cylinder may migrate distally into the head of the penis and may appear to be ready to poke through the skin or the urethra. By all means, if such an irregularity is seen, one should address it with your doctor before the prosthesis is exposed so that it can be corrected without developing infection. This problem occurs in <1% of cases.    10. Pump problems - These include difficulty in activating or deactivating the pump as well as change of pump location due to migration or fixation of the pump to the scrotal skin. These problems are also quite unusual but can happen as a result of an altered healing process or a malposition of the pump by the implanting surgeon. If the pump were to migrate or become fixed or difficult to manipulate because of its position, a simple outpatient scrotal procedure can be performed to reposition the pump which is almost universally successful. This procedure is performed in no more than 1% of cases.    Prosthesis care - In the postoperative period, it is best to take the antibiotics prescribed by your physician, reduce your physical activity to reduce swelling and enhance healing, take pain medicine for your comfort, and avoid submergingthe incision during bathing for a minimum of 1-4 weeks. Specific bathing instructions will be issued by your physician. It is not uncommon to have an inflatable prosthesis partially fill during the postoperative  "period involuntarily. This is called "auto-inflation." To prevent this problem, it is important that once the prosthesis is activated, which is typically 4-6 weeks after surgery, that you perform what is known as "cycling" of the device. Cycling means complete inflation and then complete deflation of the penile cylinders twice per day for one month. In doing this, the tissues around the prosthesis are softened and stretched allowing complete deflation of the device into the reservoir. It also will allow you to become more familiar with operating the device and make it easier for you to activate it quickly and inconspicuously when you want to engage in sexual relations. If you have an inflatable device with a scrotal pump, it is best to inflate it without twisting it. The repetitive twisting of the pump can weaken the connections between the tubing and the pump and is the most common cause for mechanical failure of the prosthesis.    It is hoped that this review will inform you of the potential problems associated with your prosthesis and as a result, will make for more realistic expectations regarding the outcome.          Copy to:     "

## 2018-12-12 ENCOUNTER — TELEPHONE (OUTPATIENT)
Dept: UROLOGY | Facility: CLINIC | Age: 58
End: 2018-12-12

## 2018-12-12 DIAGNOSIS — N48.6 PEYRONIE'S DISEASE: Primary | ICD-10-CM

## 2019-02-21 DIAGNOSIS — N48.6 PEYRONIE'S DISEASE: ICD-10-CM

## 2019-02-21 RX ORDER — PENTOXIFYLLINE 400 MG/1
TABLET, EXTENDED RELEASE ORAL
Qty: 90 TABLET | Refills: 5 | OUTPATIENT
Start: 2019-02-21

## 2019-03-06 ENCOUNTER — TELEPHONE (OUTPATIENT)
Dept: UROLOGY | Facility: CLINIC | Age: 59
End: 2019-03-06

## 2019-03-19 NOTE — PLAN OF CARE
Add 04228 Cpt? (Important Note: In 2017 The Use Of 79653 Is Being Tracked By Cms To Determine Future Global Period Reimbursement For Global Periods): yes
Problem: Patient Care Overview  Goal: Plan of Care Review  Outcome: Ongoing (interventions implemented as appropriate)  Patient resting in bed comfortably. IV intact and infusing with no signs of irritation. Fall precautions maintained with no falls noted. Call light in reach bed locked and in lowest position. Non-skid socks on while out of bed. Patient instructed to call for assistance. Skin integrity maintained as patient is independent with frequent positioning. C/o pain managed with prn meds. No other complaints or concerns. Progressing towards goals. Will continue to monitor and follow plan of care.         
Problem: Patient Care Overview  Goal: Plan of Care Review  Outcome: Ongoing (interventions implemented as appropriate)  Pt continues to progress with plan of care. Wife at bedside. Pain controlled with oral pain meds. Q 2 hour monitoring for pain and safety. Whiteboard updated.      
Body Location Override (Optional - Billing Will Still Be Based On Selected Body Map Location If Applicable): midline lower chest
Detail Level: Detailed

## 2019-04-30 ENCOUNTER — TELEPHONE (OUTPATIENT)
Dept: UROLOGY | Facility: CLINIC | Age: 59
End: 2019-04-30

## 2019-05-06 ENCOUNTER — TELEPHONE (OUTPATIENT)
Dept: UROLOGY | Facility: CLINIC | Age: 59
End: 2019-05-06

## 2019-05-06 NOTE — TELEPHONE ENCOUNTER
Spoke to pt. Informed needed cardio clearance for testopel approval. Pt reports he sees  an outside pcp [id number] and Will contact him for last office summary to fax to dept.

## 2019-05-21 ENCOUNTER — TELEPHONE (OUTPATIENT)
Dept: UROLOGY | Facility: CLINIC | Age: 59
End: 2019-05-21

## 2019-05-21 NOTE — TELEPHONE ENCOUNTER
----- Message from Bety Echevarria LPN sent at 5/6/2019  1:52 PM CDT -----  Check on authorization for testopel.

## 2019-05-21 NOTE — TELEPHONE ENCOUNTER
Spoke to pt. Informed pt authorization for testopel not approved yet but our manager is working on getting authorization. Will call him with any updates. Reminded pt he is scheduled for labs in am. He verbalized understanding.

## 2019-05-22 ENCOUNTER — LAB VISIT (OUTPATIENT)
Dept: LAB | Facility: HOSPITAL | Age: 59
End: 2019-05-22
Attending: UROLOGY
Payer: COMMERCIAL

## 2019-05-22 DIAGNOSIS — E29.1 MALE HYPOGONADISM: ICD-10-CM

## 2019-05-22 LAB
ALBUMIN SERPL BCP-MCNC: 3.8 G/DL (ref 3.5–5.2)
ALP SERPL-CCNC: 95 U/L (ref 55–135)
ALT SERPL W/O P-5'-P-CCNC: 33 U/L (ref 10–44)
AST SERPL-CCNC: 23 U/L (ref 10–40)
BASOPHILS # BLD AUTO: 0.07 K/UL (ref 0–0.2)
BASOPHILS NFR BLD: 1.2 % (ref 0–1.9)
BILIRUB DIRECT SERPL-MCNC: 0.3 MG/DL (ref 0.1–0.3)
BILIRUB SERPL-MCNC: 0.6 MG/DL (ref 0.1–1)
CHOLEST SERPL-MCNC: 200 MG/DL (ref 120–199)
CHOLEST/HDLC SERPL: 2.8 {RATIO} (ref 2–5)
COMPLEXED PSA SERPL-MCNC: <0.01 NG/ML (ref 0–4)
DIFFERENTIAL METHOD: NORMAL
EOSINOPHIL # BLD AUTO: 0.2 K/UL (ref 0–0.5)
EOSINOPHIL NFR BLD: 3.6 % (ref 0–8)
ERYTHROCYTE [DISTWIDTH] IN BLOOD BY AUTOMATED COUNT: 13.2 % (ref 11.5–14.5)
HCT VFR BLD AUTO: 44.8 % (ref 40–54)
HDLC SERPL-MCNC: 72 MG/DL (ref 40–75)
HDLC SERPL: 36 % (ref 20–50)
HGB BLD-MCNC: 14.8 G/DL (ref 14–18)
IMM GRANULOCYTES # BLD AUTO: 0.03 K/UL (ref 0–0.04)
IMM GRANULOCYTES NFR BLD AUTO: 0.5 % (ref 0–0.5)
LDLC SERPL CALC-MCNC: 113 MG/DL (ref 63–159)
LYMPHOCYTES # BLD AUTO: 2.1 K/UL (ref 1–4.8)
LYMPHOCYTES NFR BLD: 34.2 % (ref 18–48)
MCH RBC QN AUTO: 28.1 PG (ref 27–31)
MCHC RBC AUTO-ENTMCNC: 33 G/DL (ref 32–36)
MCV RBC AUTO: 85 FL (ref 82–98)
MONOCYTES # BLD AUTO: 0.7 K/UL (ref 0.3–1)
MONOCYTES NFR BLD: 10.7 % (ref 4–15)
NEUTROPHILS # BLD AUTO: 3 K/UL (ref 1.8–7.7)
NEUTROPHILS NFR BLD: 49.8 % (ref 38–73)
NONHDLC SERPL-MCNC: 128 MG/DL
NRBC BLD-RTO: 0 /100 WBC
PLATELET # BLD AUTO: 220 K/UL (ref 150–350)
PMV BLD AUTO: 9.5 FL (ref 9.2–12.9)
PROT SERPL-MCNC: 7.2 G/DL (ref 6–8.4)
RBC # BLD AUTO: 5.27 M/UL (ref 4.6–6.2)
TESTOST SERPL-MCNC: 297 NG/DL (ref 304–1227)
TRIGL SERPL-MCNC: 75 MG/DL (ref 30–150)
WBC # BLD AUTO: 6.06 K/UL (ref 3.9–12.7)

## 2019-05-22 PROCEDURE — 80076 HEPATIC FUNCTION PANEL: CPT

## 2019-05-22 PROCEDURE — 84403 ASSAY OF TOTAL TESTOSTERONE: CPT

## 2019-05-22 PROCEDURE — 85025 COMPLETE CBC W/AUTO DIFF WBC: CPT

## 2019-05-22 PROCEDURE — 80061 LIPID PANEL: CPT

## 2019-05-22 PROCEDURE — 84153 ASSAY OF PSA TOTAL: CPT

## 2019-05-22 PROCEDURE — 36415 COLL VENOUS BLD VENIPUNCTURE: CPT | Mod: PO

## 2019-05-27 ENCOUNTER — TELEPHONE (OUTPATIENT)
Dept: UROLOGY | Facility: CLINIC | Age: 59
End: 2019-05-27

## 2019-05-30 DIAGNOSIS — E29.1 MALE HYPOGONADISM: Primary | ICD-10-CM

## 2019-07-02 DIAGNOSIS — R79.89 LOW TESTOSTERONE: ICD-10-CM

## 2019-07-02 DIAGNOSIS — N40.0 BENIGN PROSTATIC HYPERPLASIA: ICD-10-CM

## 2019-07-02 RX ORDER — SILDENAFIL CITRATE 20 MG/1
TABLET ORAL
Qty: 30 TABLET | Refills: 11 | Status: SHIPPED | OUTPATIENT
Start: 2019-07-02 | End: 2019-07-02 | Stop reason: SDUPTHER

## 2019-07-02 RX ORDER — SILDENAFIL CITRATE 20 MG/1
TABLET ORAL
Qty: 30 TABLET | Refills: 11 | Status: SHIPPED | OUTPATIENT
Start: 2019-07-02 | End: 2020-07-08

## 2019-09-24 ENCOUNTER — TELEPHONE (OUTPATIENT)
Dept: UROLOGY | Facility: CLINIC | Age: 59
End: 2019-09-24

## 2019-09-24 NOTE — TELEPHONE ENCOUNTER
----- Message from Bety Echevarria LPN sent at 9/18/2019  5:47 PM CDT -----    Sent: 9/18/2019   2:24 PM   To: Buddy QUEEN Staff     Patient Returning Call     Who Left Message for Patient: Bety     Norman Call Back Number: [phone]

## 2019-09-30 ENCOUNTER — TELEPHONE (OUTPATIENT)
Dept: UROLOGY | Facility: CLINIC | Age: 59
End: 2019-09-30

## 2019-09-30 NOTE — TELEPHONE ENCOUNTER
----- Message from Shilpi Edouard LPN sent at 9/27/2019  2:26 PM CDT -----  Contact:   Pt   [phone]  Bety Mr. Rios said he saw his cardiologist hi name is Dr. Zacarias you can get his clerance from him then schedule his testopel appointment   ----- Message -----  From: Maryann Olivarez  Sent: 9/27/2019   1:29 PM CDT  To: Buddy QUEEN Staff    Pt  Checking the status of an appt being schedule with his heart .  Give the pt a call back to schedule

## 2019-10-02 ENCOUNTER — TELEPHONE (OUTPATIENT)
Dept: UROLOGY | Facility: CLINIC | Age: 59
End: 2019-10-02

## 2019-10-02 DIAGNOSIS — E29.1 MALE HYPOGONADISM: Primary | ICD-10-CM

## 2019-10-02 NOTE — TELEPHONE ENCOUNTER
Done.  ----- Message from Bety Echevarria LPN sent at 10/2/2019  1:05 PM CDT -----  ,  Pt needs new referral for testopel for 12 pellets. Last one was denied because he had not seen his cardiologist over a year.   Bety

## 2019-10-09 ENCOUNTER — OFFICE VISIT (OUTPATIENT)
Dept: UROLOGY | Facility: CLINIC | Age: 59
End: 2019-10-09
Payer: COMMERCIAL

## 2019-10-09 VITALS
HEART RATE: 93 BPM | BODY MASS INDEX: 33.95 KG/M2 | DIASTOLIC BLOOD PRESSURE: 81 MMHG | WEIGHT: 264.56 LBS | HEIGHT: 74 IN | SYSTOLIC BLOOD PRESSURE: 136 MMHG

## 2019-10-09 DIAGNOSIS — N52.31 ERECTILE DYSFUNCTION FOLLOWING RADICAL PROSTATECTOMY: ICD-10-CM

## 2019-10-09 DIAGNOSIS — C61 PROSTATE CANCER: ICD-10-CM

## 2019-10-09 DIAGNOSIS — E29.1 MALE HYPOGONADISM: Primary | ICD-10-CM

## 2019-10-09 DIAGNOSIS — E11.9 TYPE 2 DIABETES MELLITUS WITHOUT COMPLICATION, WITH LONG-TERM CURRENT USE OF INSULIN: ICD-10-CM

## 2019-10-09 DIAGNOSIS — Z79.4 TYPE 2 DIABETES MELLITUS WITHOUT COMPLICATION, WITH LONG-TERM CURRENT USE OF INSULIN: ICD-10-CM

## 2019-10-09 DIAGNOSIS — N48.6 PEYRONIE'S DISEASE: ICD-10-CM

## 2019-10-09 DIAGNOSIS — I10 ESSENTIAL HYPERTENSION: ICD-10-CM

## 2019-10-09 PROCEDURE — S0189 PR TESTOSTERONE PELLET 75 MG: ICD-10-PCS | Mod: S$GLB,,, | Performed by: UROLOGY

## 2019-10-09 PROCEDURE — 99999 PR PBB SHADOW E&M-EST. PATIENT-LVL III: ICD-10-PCS | Mod: PBBFAC,,, | Performed by: UROLOGY

## 2019-10-09 PROCEDURE — 99214 PR OFFICE/OUTPT VISIT, EST, LEVL IV, 30-39 MIN: ICD-10-PCS | Mod: 25,S$GLB,, | Performed by: UROLOGY

## 2019-10-09 PROCEDURE — 3075F SYST BP GE 130 - 139MM HG: CPT | Mod: CPTII,S$GLB,, | Performed by: UROLOGY

## 2019-10-09 PROCEDURE — 3079F DIAST BP 80-89 MM HG: CPT | Mod: CPTII,S$GLB,, | Performed by: UROLOGY

## 2019-10-09 PROCEDURE — 11980 PR IMPLANT,HORMONE,SUBCUTANEOUS: ICD-10-PCS | Mod: S$GLB,,, | Performed by: UROLOGY

## 2019-10-09 PROCEDURE — 99214 OFFICE O/P EST MOD 30 MIN: CPT | Mod: 25,S$GLB,, | Performed by: UROLOGY

## 2019-10-09 PROCEDURE — S0189 TESTOSTERONE PELLET 75 MG: HCPCS | Mod: S$GLB,,, | Performed by: UROLOGY

## 2019-10-09 PROCEDURE — 3008F BODY MASS INDEX DOCD: CPT | Mod: CPTII,S$GLB,, | Performed by: UROLOGY

## 2019-10-09 PROCEDURE — 3008F PR BODY MASS INDEX (BMI) DOCUMENTED: ICD-10-PCS | Mod: CPTII,S$GLB,, | Performed by: UROLOGY

## 2019-10-09 PROCEDURE — 11980 IMPLANT HORMONE PELLET(S): CPT | Mod: S$GLB,,, | Performed by: UROLOGY

## 2019-10-09 PROCEDURE — 3079F PR MOST RECENT DIASTOLIC BLOOD PRESSURE 80-89 MM HG: ICD-10-PCS | Mod: CPTII,S$GLB,, | Performed by: UROLOGY

## 2019-10-09 PROCEDURE — 99999 PR PBB SHADOW E&M-EST. PATIENT-LVL III: CPT | Mod: PBBFAC,,, | Performed by: UROLOGY

## 2019-10-09 PROCEDURE — 3075F PR MOST RECENT SYSTOLIC BLOOD PRESS GE 130-139MM HG: ICD-10-PCS | Mod: CPTII,S$GLB,, | Performed by: UROLOGY

## 2019-10-09 RX ORDER — ATORVASTATIN CALCIUM 10 MG/1
TABLET, FILM COATED ORAL EVERY MORNING
COMMUNITY
Start: 2019-09-16 | End: 2022-05-25 | Stop reason: ALTCHOICE

## 2019-10-09 RX ORDER — INSULIN ASPART 100 [IU]/ML
12 INJECTION, SOLUTION INTRAVENOUS; SUBCUTANEOUS
COMMUNITY
Start: 2019-10-01

## 2019-10-09 NOTE — PROGRESS NOTES
Mr. Rios is a 59 y.o. male presenting with ED.    PRESENTING ILLNESS:    Rickey Rios is a 59 y.o. male s/p RALP on 9/8/2017 by Dr. Burns.  Since then, he c/o ED.  Prior to surgery, he did have ED mild ED and was on TRT due to fatigue.  He's on testopel currently.  He understands that this is controversial.    He's tried and failed oral meds and ICI for  His ED.  He'd like an IPP, but his A1c has been too high.  Additionally, he's having second thoughts.    He was also told that he had peyronie's.  He noticed a curve to his penis > 1 year ago.  No difficulty with penetration due to the curve. He doesn't know the degree of curve as he hasn't had a good erection.    He does not have CJ.  He is wearing 0 pads/day.  No hematuria.  No dysuria.  Good FOS.    No bone pain or weight loss.    REVIEW OF SYSTEMS:    Rickey Rios denies any history of headache, blurred vision, fever, nausea, vomiting, chills, flank discomfort, abdominal pain, bleeding per rectum, cough, SOB, recent loss of consciousness, recent mental status changes, seizures, dizziness, or upper or lower extremity weakness.    COCO  1. 1  2. 1  3. 1  4. 1  5. 2      PATIENT HISTORY:    Past Medical History:   Diagnosis Date    ADD (attention deficit disorder)     Anxiety     Arthritis     Diabetes mellitus, type 2     Dyslexia     H/O elbow surgery 10/2017    Hypertension     preventative     BLAIRE on CPAP     Prostate cancer 8/24/2017       Family History   Problem Relation Age of Onset    Pulmonary fibrosis Mother     Diabetes Father     Atrial fibrillation Brother        Past Surgical History:   Procedure Laterality Date    BACK SURGERY      HEEL SPUR SURGERY      NECK SURGERY      PROSTATE SURGERY         Social History     Socioeconomic History    Marital status:      Spouse name: Not on file    Number of children: 4    Years of education: Not on file    Highest education level: Not on file   Occupational  "History    Occupation: [employer] employee   Social Needs    Financial resource strain: Not on file    Food insecurity:     Worry: Not on file     Inability: Not on file    Transportation needs:     Medical: Not on file     Non-medical: Not on file   Tobacco Use    Smoking status: Never Smoker    Smokeless tobacco: Never Used   Substance and Sexual Activity    Alcohol use: No    Drug use: No    Sexual activity: Yes     Partners: Female   Lifestyle    Physical activity:     Days per week: Not on file     Minutes per session: Not on file    Stress: Not on file   Relationships    Social connections:     Talks on phone: Not on file     Gets together: Not on file     Attends Christian service: Not on file     Active member of club or organization: Not on file     Attends meetings of clubs or organizations: Not on file     Relationship status: Not on file   Other Topics Concern    Not on file   Social History Narrative    Not on file       Review of patient's allergies indicates:   Allergen Reactions    Phenergan [promethazine] Anaphylaxis and Edema     Throat/mouth swelling    Zofran [ondansetron hcl (pf)] Anaphylaxis, Hives and Edema     Throat swelling & mouth    Metformin Nausea And Vomiting         Current Outpatient Medications:     atorvastatin (LIPITOR) 10 MG tablet, , Disp: , Rfl:     BD ULTRA-FINE BERNARDO PEN NEEDLES 32 gauge x 5/32" Ndle, , Disp: , Rfl:     buPROPion (WELLBUTRIN XL) 300 MG 24 hr tablet, , Disp: , Rfl:     FREESTYLE LANCETS 28 gauge lancets, , Disp: , Rfl:     FREESTYLE LITE STRIPS Strp, , Disp: , Rfl:     glimepiride (AMARYL) 4 MG tablet, , Disp: , Rfl:     indomethacin (INDOCIN SR) 75 mg CpSR CR capsule, , Disp: , Rfl:     insulin glargine, TOUJEO, (TOUJEO SOLOSTAR) 300 unit/mL (1.5 mL) InPn pen, Inject into the skin., Disp: , Rfl:     insulin syringe-needle U-100 1 mL 31 gauge x 5/16 Syrg, Use as directed with ICI Therapy, Disp: 10 each, Rfl: 12    INVOKANA 300 mg Tab " tablet, , Disp: , Rfl:     lorazepam (ATIVAN) 0.5 MG tablet, Take 0.5 mg by mouth every 6 (six) hours as needed for Anxiety., Disp: , Rfl:     NOVOLOG FLEXPEN U-100 INSULIN 100 unit/mL (3 mL) InPn pen, , Disp: , Rfl:     papaverine 30 mg/mL injection, Add: Phentolamine 10 mg Add: PGE1 100 mcg  Sig:  Inject 10 units (0.10 mls) as directed, Disp: 10 mL, Rfl: 11    polyethylene glycol (GLYCOLAX) 17 gram/dose powder, , Disp: , Rfl:     sildenafil (REVATIO) 20 mg Tab, TAKE 1-3 TABLETS 30 MINUTES PRIOR TO INTERCOURSE. MAX OF 5., Disp: 30 tablet, Rfl: 11    STRATTERA 80 mg capsule, , Disp: , Rfl:     tadalafil (CIALIS) 5 MG tablet, Take 1 PO QOD for prostate cancer ED, Disp: 30 tablet, Rfl: 11    valsartan (DIOVAN) 160 MG tablet, , Disp: , Rfl:     pentoxifylline (TRENTAL) 400 mg TbSR, Take 1 tablet (400 mg total) by mouth 3 (three) times daily with meals., Disp: 90 tablet, Rfl: 5    Current Facility-Administered Medications:     testosterone Pllt 12 Pellet, 12 Pellet, Implant, 1 time in Clinic/HOD, Denis Goodwin MD      PHYSICAL EXAMINATION:    The patient generally appears in good health, is appropriately interactive, and is in no apparent distress.     Eyes: anicteric sclerae, moist conjunctivae; no lid-lag; PERRLA     HENT: Atraumatic; oropharynx clear with moist mucous membranes and no mucosal ulcerations;normal hard and soft palate. No evidence of lymphadenopathy.    Neck: Trachea midline.  No thyromegaly.    Musculoskeletal: No abnormal gait.    Skin: No lesions.    Mental: Cooperative with normal affect.  Is oriented to time, place, and person.    Neuro: Grossly intact.    Chest: Normal inspiratory effort.   No accessory muscles.  No audible wheezes.  Respirations symmetric on inspiration and expiration.    Heart: Regular rhythm.      Abdomen:  Soft, non-tender. No masses or organomegaly. Bladder is not palpable. No evidence of flank discomfort. No evidence of inguinal hernia.    Genitourinary: The  penis is circumcised with a plaque c/w peyronie's on the shaft. The urethral meatus is normal. The testes, epididymides, and cord structures are normal in size and contour bilaterally. The scrotum is normal in size and contour.    Extremities: No clubbing, cyanosis, or edema        LABS:      Lab Results   Component Value Date    PSADIAG <0.01 05/22/2019    PSADIAG <0.01 11/07/2018    PSADIAG <0.01 04/30/2018        IMPRESSION:    Encounter Diagnoses   Name Primary?    Male hypogonadism Yes    Prostate cancer     Erectile dysfunction following radical prostatectomy     Peyronie's disease     Essential hypertension      DM, controlled  HTN, controlled    PLAN:    1. Discussed that TRT after RALP is controversial.  He's willing to accept the risk.   2. Discussed options for his ED.  He'd like an IPP.  His A1c is too high currently.  And he has cold feet about it.  We did discuss that I feel this would be the best thing for him.  3. Procedure Report:Testopel Insertion    A Time Out was performed with the patient and nurse in the room.   The site was marked with a yes. Verbal consent was obtained. The risks and benefits of testosterone replacement were explained. The alternatives of observation and treatment with injections and gel therapy were discussed.     The risks of testopel explained to the patient include but are not limited to worsening of BPH, edema with or without congestive heart failure, gynecomastia, cellulitis and abscess, venous thromboembolic events, testicular atrophy, possible cardiac sequelae and with high dose testosterone the risk of liver function elevation (peliosis hepatitis) and hepatocellular carcinoma. Peliosis hepatitis can be a fatal complication.     R gluteal region prepped and draped in sterile fashion.  20 cc of 2% lidocaine used for local analgesia.  Small incision made with a 15 blade.  Standard trocars used for subcutaneous insertion of 12 pellets total in a V shaped tract.  No  active bleeding noted.  Area cleaned and dried. Steri strips applied. Dressing applied.      He can RTC 6 months with T, psa, cbc, hepatic panel lipid panel.             Copy to:

## 2019-10-23 ENCOUNTER — TELEPHONE (OUTPATIENT)
Dept: UROLOGY | Facility: CLINIC | Age: 59
End: 2019-10-23

## 2019-10-23 NOTE — TELEPHONE ENCOUNTER
Spoke to pt. Pt decided he wants to proceed with ipp. Informed pt he may need to come in for appt. Would discuss with  and call him back. He verbalized understanding.

## 2019-10-28 ENCOUNTER — OFFICE VISIT (OUTPATIENT)
Dept: UROLOGY | Facility: CLINIC | Age: 59
End: 2019-10-28
Payer: COMMERCIAL

## 2019-10-28 VITALS
BODY MASS INDEX: 34.47 KG/M2 | WEIGHT: 260.13 LBS | HEART RATE: 112 BPM | DIASTOLIC BLOOD PRESSURE: 99 MMHG | HEIGHT: 73 IN | SYSTOLIC BLOOD PRESSURE: 145 MMHG

## 2019-10-28 DIAGNOSIS — I10 ESSENTIAL HYPERTENSION: ICD-10-CM

## 2019-10-28 DIAGNOSIS — N52.31 ERECTILE DYSFUNCTION FOLLOWING RADICAL PROSTATECTOMY: Primary | ICD-10-CM

## 2019-10-28 DIAGNOSIS — N48.6 PEYRONIE'S DISEASE: ICD-10-CM

## 2019-10-28 DIAGNOSIS — Z79.4 TYPE 2 DIABETES MELLITUS WITHOUT COMPLICATION, WITH LONG-TERM CURRENT USE OF INSULIN: ICD-10-CM

## 2019-10-28 DIAGNOSIS — E11.9 TYPE 2 DIABETES MELLITUS WITHOUT COMPLICATION, WITH LONG-TERM CURRENT USE OF INSULIN: ICD-10-CM

## 2019-10-28 DIAGNOSIS — C61 PROSTATE CANCER: ICD-10-CM

## 2019-10-28 PROCEDURE — 3008F PR BODY MASS INDEX (BMI) DOCUMENTED: ICD-10-PCS | Mod: CPTII,S$GLB,, | Performed by: UROLOGY

## 2019-10-28 PROCEDURE — 3077F SYST BP >= 140 MM HG: CPT | Mod: CPTII,S$GLB,, | Performed by: UROLOGY

## 2019-10-28 PROCEDURE — 99214 PR OFFICE/OUTPT VISIT, EST, LEVL IV, 30-39 MIN: ICD-10-PCS | Mod: S$GLB,,, | Performed by: UROLOGY

## 2019-10-28 PROCEDURE — 3080F DIAST BP >= 90 MM HG: CPT | Mod: CPTII,S$GLB,, | Performed by: UROLOGY

## 2019-10-28 PROCEDURE — 99214 OFFICE O/P EST MOD 30 MIN: CPT | Mod: S$GLB,,, | Performed by: UROLOGY

## 2019-10-28 PROCEDURE — 3077F PR MOST RECENT SYSTOLIC BLOOD PRESSURE >= 140 MM HG: ICD-10-PCS | Mod: CPTII,S$GLB,, | Performed by: UROLOGY

## 2019-10-28 PROCEDURE — 99999 PR PBB SHADOW E&M-EST. PATIENT-LVL III: ICD-10-PCS | Mod: PBBFAC,,, | Performed by: UROLOGY

## 2019-10-28 PROCEDURE — 3008F BODY MASS INDEX DOCD: CPT | Mod: CPTII,S$GLB,, | Performed by: UROLOGY

## 2019-10-28 PROCEDURE — 3080F PR MOST RECENT DIASTOLIC BLOOD PRESSURE >= 90 MM HG: ICD-10-PCS | Mod: CPTII,S$GLB,, | Performed by: UROLOGY

## 2019-10-28 PROCEDURE — 99999 PR PBB SHADOW E&M-EST. PATIENT-LVL III: CPT | Mod: PBBFAC,,, | Performed by: UROLOGY

## 2019-10-28 NOTE — H&P (VIEW-ONLY)
Mr. Rios is a 59 y.o. male presenting with ED.    PRESENTING ILLNESS:    Rickey Rios is a 59 y.o. male s/p RALP on 9/8/2017 by Dr. Burns.  Since then, he c/o ED.  Prior to surgery, he did have ED mild ED and was on TRT due to fatigue.  He's on testopel currently.  He understands that this is controversial.    He's tried and failed oral meds and ICI for  His ED.  He'd like an IPP, but his A1c has been too high.  Additionally, he's having second thoughts.    He was also told that he had peyronie's.  He noticed a curve to his penis > 1 year ago.  No difficulty with penetration due to the curve. He doesn't know the degree of curve as he hasn't had a good erection.    He does not have CJ.  He is wearing 0 pads/day.  No hematuria.  No dysuria.  Good FOS.    No bone pain or weight loss.    REVIEW OF SYSTEMS:    Rickey Rios denies any history of headache, blurred vision, fever, nausea, vomiting, chills, flank discomfort, abdominal pain, bleeding per rectum, cough, SOB, recent loss of consciousness, recent mental status changes, seizures, dizziness, or upper or lower extremity weakness.    COCO  1. 1  2. 1  3. 1  4. 1  5. 2     PATIENT HISTORY:    Past Medical History:   Diagnosis Date    ADD (attention deficit disorder)     Anxiety     Arthritis     Diabetes mellitus, type 2     Dyslexia     H/O elbow surgery 10/2017    Hypertension     preventative     BLAIRE on CPAP     Prostate cancer 8/24/2017       Family History   Problem Relation Age of Onset    Pulmonary fibrosis Mother     Diabetes Father     Atrial fibrillation Brother        Past Surgical History:   Procedure Laterality Date    BACK SURGERY      HEEL SPUR SURGERY      NECK SURGERY      PROSTATE SURGERY         Social History     Socioeconomic History    Marital status:      Spouse name: Not on file    Number of children: 4    Years of education: Not on file    Highest education level: Not on file   Occupational  "History    Occupation: [employer] employee   Social Needs    Financial resource strain: Not on file    Food insecurity:     Worry: Not on file     Inability: Not on file    Transportation needs:     Medical: Not on file     Non-medical: Not on file   Tobacco Use    Smoking status: Never Smoker    Smokeless tobacco: Never Used   Substance and Sexual Activity    Alcohol use: No    Drug use: No    Sexual activity: Yes     Partners: Female   Lifestyle    Physical activity:     Days per week: Not on file     Minutes per session: Not on file    Stress: Not on file   Relationships    Social connections:     Talks on phone: Not on file     Gets together: Not on file     Attends Mu-ism service: Not on file     Active member of club or organization: Not on file     Attends meetings of clubs or organizations: Not on file     Relationship status: Not on file   Other Topics Concern    Not on file   Social History Narrative    Not on file       Review of patient's allergies indicates:   Allergen Reactions    Phenergan [promethazine] Anaphylaxis and Edema     Throat/mouth swelling    Zofran [ondansetron hcl (pf)] Anaphylaxis, Hives and Edema     Throat swelling & mouth    Metformin Nausea And Vomiting         Current Outpatient Medications:     atorvastatin (LIPITOR) 10 MG tablet, , Disp: , Rfl:     BD ULTRA-FINE BERNARDO PEN NEEDLES 32 gauge x 5/32" Ndle, , Disp: , Rfl:     buPROPion (WELLBUTRIN XL) 300 MG 24 hr tablet, , Disp: , Rfl:     FREESTYLE LANCETS 28 gauge lancets, , Disp: , Rfl:     FREESTYLE LITE STRIPS Strp, , Disp: , Rfl:     glimepiride (AMARYL) 4 MG tablet, , Disp: , Rfl:     indomethacin (INDOCIN SR) 75 mg CpSR CR capsule, , Disp: , Rfl:     insulin glargine, TOUJEO, (TOUJEO SOLOSTAR) 300 unit/mL (1.5 mL) InPn pen, Inject into the skin., Disp: , Rfl:     insulin syringe-needle U-100 1 mL 31 gauge x 5/16 Syrg, Use as directed with ICI Therapy, Disp: 10 each, Rfl: 12    INVOKANA 300 mg Tab " tablet, , Disp: , Rfl:     lorazepam (ATIVAN) 0.5 MG tablet, Take 0.5 mg by mouth every 6 (six) hours as needed for Anxiety., Disp: , Rfl:     NOVOLOG FLEXPEN U-100 INSULIN 100 unit/mL (3 mL) InPn pen, , Disp: , Rfl:     papaverine 30 mg/mL injection, Add: Phentolamine 10 mg Add: PGE1 100 mcg  Sig:  Inject 10 units (0.10 mls) as directed, Disp: 10 mL, Rfl: 11    polyethylene glycol (GLYCOLAX) 17 gram/dose powder, , Disp: , Rfl:     sildenafil (REVATIO) 20 mg Tab, TAKE 1-3 TABLETS 30 MINUTES PRIOR TO INTERCOURSE. MAX OF 5., Disp: 30 tablet, Rfl: 11    STRATTERA 80 mg capsule, , Disp: , Rfl:     tadalafil (CIALIS) 5 MG tablet, Take 1 PO QOD for prostate cancer ED, Disp: 30 tablet, Rfl: 11    valsartan (DIOVAN) 160 MG tablet, , Disp: , Rfl:     pentoxifylline (TRENTAL) 400 mg TbSR, Take 1 tablet (400 mg total) by mouth 3 (three) times daily with meals., Disp: 90 tablet, Rfl: 5    Current Facility-Administered Medications:     [START ON 3/17/2020] testosterone Pllt 12 Pellet, 12 Pellet, Implant, 1 time in Clinic/HOD, Denis Goodwin MD      PHYSICAL EXAMINATION:    The patient generally appears in good health, is appropriately interactive, and is in no apparent distress.     Eyes: anicteric sclerae, moist conjunctivae; no lid-lag; PERRLA     HENT: Atraumatic; oropharynx clear with moist mucous membranes and no mucosal ulcerations;normal hard and soft palate. No evidence of lymphadenopathy.    Neck: Trachea midline.  No thyromegaly.    Musculoskeletal: No abnormal gait.    Skin: No lesions.    Mental: Cooperative with normal affect.  Is oriented to time, place, and person.    Neuro: Grossly intact.    Chest: Normal inspiratory effort.   No accessory muscles.  No audible wheezes.  Respirations symmetric on inspiration and expiration.    Heart: Regular rhythm.      Abdomen:  Soft, non-tender. No masses or organomegaly. Bladder is not palpable. No evidence of flank discomfort. No evidence of inguinal  hernia.    Genitourinary: The penis is circumcised with a plaque c/w peyronie's on the shaft. The urethral meatus is normal. The testes, epididymides, and cord structures are normal in size and contour bilaterally. The scrotum is normal in size and contour.    Extremities: No clubbing, cyanosis, or edema        LABS:    UA dipped negative today  Lab Results   Component Value Date    PSADIAG <0.01 05/22/2019    PSADIAG <0.01 11/07/2018    PSADIAG <0.01 04/30/2018        IMPRESSION:    Encounter Diagnoses   Name Primary?    Erectile dysfunction following radical prostatectomy Yes    Peyronie's disease     Prostate cancer     Essential hypertension     Type 2 diabetes mellitus without complication, with long-term current use of insulin      DM, controlled  HTN, controlled    PLAN:    1. Discussed that TRT after RALP is controversial.  He's willing to accept the risk.   2. Discussed options for his ED.  He'd like an IPP.  Will check an A1c.  Will plan for IPP with modeling.  3. Continue testopel.  4. Patient read the IPP handout and understands the risks associated with this procedure. He knows the risk of infection as well as surgery requirements if it were to become infected. He understands the impact on spontaneous and nocturnal erections, as well as need for replacement and repair, which was clearly outlined in verbal and written form. He was given the chance to ask questions and alternatives were discussed.  5. Risks discussed were You have elected to undergo placement of a penile prosthesis. Several devices are currently available in the marketplace, including those which are non-inflatable, versus two- or three-piece inflatable prostheses. All of these devices have the capacity to give you the opportunity to have a rigid penis on demand and to be used as frequently and as long as you would like. There are, therefore, many advantages to the use of the prosthesis which you have already discussed with your  physician. The goal of this informed consent form is to identify the potential problems that have been recognized to occur with penile prosthesis placement and that you understand the risks of undergoing prosthetic placement. It is important to recognize that as a result of improvements in design as well as better surgical technique, that all of the risks listed below are less than they were even 10 years ago. It is also important to recognize that most of the available studies report on historical data for devices which are now either not manufactured or have undergone structural improvements to reduce those side effects. The complications are simply noted in random order and do not reflect their frequency.    1. Prosthesis mechanical failure occurs as a result of leakage of fluid from the inflatable types of devices. This typically occurs as a result of a fracture in the tubing, most commonly as it emerges out of the scrotal pump, but it can also be due to a leak from the erectile cylinders in the penis. It is felt that this occurs as a result of repetitive mechanical trauma, which weakens the tubing and causes it to crack. When the fluid leaks, it is not something you would be aware of. It does not cause pain. The fluid contained within the device is typically a sterile saline solution that will simply be reabsorbed by the body. What you will recognize is that when you squeeze the pump, there will be no transfer of fluid and no rigidity or inadequate rigidity. The most recent long-term data looking at 5-10 year success reports mechanical failure in the 5-10% range over that period of time. Looked at another way, 90-95% of men will have a functional prosthesis in 10 years. These devices are designed to be used for life. Each company has its own warrantee which you should discuss with your physician.    2. Infection is a disastrous complication which usually requires the removal of the prosthesis, as it is rare to  "be able to clear infection so long as the prosthesis is within the body. Occasionally, the infected prosthesis can be removed, the location of the device can be washed out vigorously with a special antibiotic salvage procedure and then a new device may be able to be immediately placed. When this is not possible, the infected device is removed and then a period of healing will follow where the device can be replaced after a period of healing (6 weeks to 6 months). Delayed replacement of a prosthesis after initial removal is a more complicated operation associated with the potential for not being able to replace the device because of scarring but other problems such as shortening of the penis or change in sensation and shape may also occur.    As a result of improved surgical technique and device design, infection rates are now markedly reduced, typically being reported in the <1-2% range for new prosthesis placements and up to 3% when the prosthesis is uninfected and has mechanically failed.    3. Bleeding and hematoma are rarely a problem. There is likely to be localized swelling as well as "black and blue" of the penis, groin area, and scrotal sack. These will resolve without treatment over 1-3 weeks. Rarely a scrotal hematoma (collection of blood) will develop which can be treated with rest; it will reabsorb with time or, if it is growing in size or painful, it may need to be evacuated surgically (opened up and washed out). The risk of needing a blood transfusion after penile prosthesis placement is extremely rare to nonexistent.    4. Post-operative pain is variable and can be minimal, but in most men it is quite significant. Your physician will provide you with adequate pain medication to help control the pain, but not necessarily eliminate it entirely. As the prosthesis heals, there will be complete resolution of the pain, such that you will typically not even be aware that the prosthesis is within your body " unless you were to touch it directly. Complete pain resolution will most commonly occur within 4-12 weeks postoperatively. Post-operative pain is typically managed with oral narcotic agents. You should be aware that these drugs can cause constipation as well as sleepiness; therefore, you should not be in any position where you might need to make important decision or driving until the pain is under better control without the need for narcotic pain killers. It is recommended that during the first several days after the operation, that you spend as little time as possible on your feet, as this will encourage healing, reduce swelling, and result in more rapid resolution of pain.    5. Loss of length -  Penile shortening is probably the reason that most men are disappointed with the outcome from their prosthesis surgery. Studies have shown that when the flaccid penile stretched length is measured preoperatively, that the actual loss of length following placement of the prosthesis is on average no more than one centimeter (1/3 inch). To reduce the likelihood of loss of length, the surgeon will do his best to place the proper size device that fits your penis. You can expect that the length of your penis will be much like what you see when you grab the head of the penis and pull it straight out away from your body. Many men who believe they have lost length in their penis following prosthesis surgery in fact did not really lose length because of the surgery, but rather because they have not had had a full erection for some time during which there may have been some loss of tissue elasticity and thereby shortening of the penis. In addition, they may have gained some weight in the pubic area which will cover the penis and make it look shorter. Lastly, they may be expecting that the postoperative result will be like the erections they had when they were a much younger man. Although the goal is to make the penis as long as  possible, one can expect that the rigidity of the penis will be much like the erections obtained as a younger man.    6. Decreased girth - Girth is typically not substantially changed as most cylinders can expand and fill the penis satisfactorily, but if there has been scarring within the tissues of the penis, this can prevent expansion and result in a narrower appearing penis. The surgeon will do his best to put the largest cylinder in the penis, but there are limitations to which the tissues can expand. Decreased girth is not a common complaint.    7. Sensory loss - By and large the surgical techniques being used to avoid injury to the sensory nerves of the penis. Therefore, there is rarely any significant change in the sexual sensation of the penis. Some men find that it takes longer for them to experience orgasm. This may occur because they can simply inflate the penis without any sexuall arousal, and then it will seem to take longer for them to become aroused, resulting in the prolonged time to orgasm. Therefore, proper sexual stimulation is important to enjoy the entire sexual experience with a prosthesis.    8. Change in shape - The overall shape or configuration of the penis is rarely altered as a result of placement of any type of prosthesis, but if there is some unidentified scarring involving the penis such as that which occurs with Peyronie's disease, some curvature or indentations including hourglass narrowing can be identified along the shaft. Typically, in the postoperative period, the prosthesis will cause an internal tissue expansion which will correct these deformities. This may take 6-9 months occur.    9. Erosion or cylinder extrusion - If the tissues in the tips of the penis are weakened either by previous internal penile disease or as a result of the surgery, the prosthetic cylinder may migrate distally into the head of the penis and may appear to be ready to poke through the skin or the  "urethra. By all means, if such an irregularity is seen, one should address it with your doctor before the prosthesis is exposed so that it can be corrected without developing infection. This problem occurs in <1% of cases.    10. Pump problems - These include difficulty in activating or deactivating the pump as well as change of pump location due to migration or fixation of the pump to the scrotal skin. These problems are also quite unusual but can happen as a result of an altered healing process or a malposition of the pump by the implanting surgeon. If the pump were to migrate or become fixed or difficult to manipulate because of its position, a simple outpatient scrotal procedure can be performed to reposition the pump which is almost universally successful. This procedure is performed in no more than 1% of cases.    Prosthesis care - In the postoperative period, it is best to take the antibiotics prescribed by your physician, reduce your physical activity to reduce swelling and enhance healing, take pain medicine for your comfort, and avoid submergingthe incision during bathing for a minimum of 1-4 weeks. Specific bathing instructions will be issued by your physician. It is not uncommon to have an inflatable prosthesis partially fill during the postoperative period involuntarily. This is called "auto-inflation." To prevent this problem, it is important that once the prosthesis is activated, which is typically 4-6 weeks after surgery, that you perform what is known as "cycling" of the device. Cycling means complete inflation and then complete deflation of the penile cylinders twice per day for one month. In doing this, the tissues around the prosthesis are softened and stretched allowing complete deflation of the device into the reservoir. It also will allow you to become more familiar with operating the device and make it easier for you to activate it quickly and inconspicuously when you want to engage in sexual " relations. If you have an inflatable device with a scrotal pump, it is best to inflate it without twisting it. The repetitive twisting of the pump can weaken the connections between the tubing and the pump and is the most common cause for mechanical failure of the prosthesis.    It is hoped that this review will inform you of the potential problems associated with your prosthesis and as a result, will make for more realistic expectations regarding the outcome.          Copy to:

## 2019-10-28 NOTE — PROGRESS NOTES
Mr. Rios is a 59 y.o. male presenting with ED.    PRESENTING ILLNESS:    Rickey Rios is a 59 y.o. male s/p RALP on 9/8/2017 by Dr. Burns.  Since then, he c/o ED.  Prior to surgery, he did have ED mild ED and was on TRT due to fatigue.  He's on testopel currently.  He understands that this is controversial.    He's tried and failed oral meds and ICI for  His ED.  He'd like an IPP, but his A1c has been too high.  Additionally, he's having second thoughts.    He was also told that he had peyronie's.  He noticed a curve to his penis > 1 year ago.  No difficulty with penetration due to the curve. He doesn't know the degree of curve as he hasn't had a good erection.    He does not have CJ.  He is wearing 0 pads/day.  No hematuria.  No dysuria.  Good FOS.    No bone pain or weight loss.    REVIEW OF SYSTEMS:    Rickey Rios denies any history of headache, blurred vision, fever, nausea, vomiting, chills, flank discomfort, abdominal pain, bleeding per rectum, cough, SOB, recent loss of consciousness, recent mental status changes, seizures, dizziness, or upper or lower extremity weakness.    COCO  1. 1  2. 1  3. 1  4. 1  5. 2     PATIENT HISTORY:    Past Medical History:   Diagnosis Date    ADD (attention deficit disorder)     Anxiety     Arthritis     Diabetes mellitus, type 2     Dyslexia     H/O elbow surgery 10/2017    Hypertension     preventative     BLAIRE on CPAP     Prostate cancer 8/24/2017       Family History   Problem Relation Age of Onset    Pulmonary fibrosis Mother     Diabetes Father     Atrial fibrillation Brother        Past Surgical History:   Procedure Laterality Date    BACK SURGERY      HEEL SPUR SURGERY      NECK SURGERY      PROSTATE SURGERY         Social History     Socioeconomic History    Marital status:      Spouse name: Not on file    Number of children: 4    Years of education: Not on file    Highest education level: Not on file   Occupational  "History    Occupation: [employer] employee   Social Needs    Financial resource strain: Not on file    Food insecurity:     Worry: Not on file     Inability: Not on file    Transportation needs:     Medical: Not on file     Non-medical: Not on file   Tobacco Use    Smoking status: Never Smoker    Smokeless tobacco: Never Used   Substance and Sexual Activity    Alcohol use: No    Drug use: No    Sexual activity: Yes     Partners: Female   Lifestyle    Physical activity:     Days per week: Not on file     Minutes per session: Not on file    Stress: Not on file   Relationships    Social connections:     Talks on phone: Not on file     Gets together: Not on file     Attends [employer] service: Not on file     Active member of club or organization: Not on file     Attends meetings of clubs or organizations: Not on file     Relationship status: Not on file   Other Topics Concern    Not on file   Social History Narrative    Not on file       Review of patient's allergies indicates:   Allergen Reactions    Phenergan [promethazine] Anaphylaxis and Edema     Throat/mouth swelling    Zofran [ondansetron hcl (pf)] Anaphylaxis, Hives and Edema     Throat swelling & mouth    Metformin Nausea And Vomiting         Current Outpatient Medications:     atorvastatin (LIPITOR) 10 MG tablet, , Disp: , Rfl:     BD ULTRA-FINE BERNARDO PEN NEEDLES 32 gauge x 5/32" Ndle, , Disp: , Rfl:     buPROPion (WELLBUTRIN XL) 300 MG 24 hr tablet, , Disp: , Rfl:     FREESTYLE LANCETS 28 gauge lancets, , Disp: , Rfl:     FREESTYLE LITE STRIPS Strp, , Disp: , Rfl:     glimepiride (AMARYL) 4 MG tablet, , Disp: , Rfl:     indomethacin (INDOCIN SR) 75 mg CpSR CR capsule, , Disp: , Rfl:     insulin glargine, TOUJEO, (TOUJEO SOLOSTAR) 300 unit/mL (1.5 mL) InPn pen, Inject into the skin., Disp: , Rfl:     insulin syringe-needle U-100 1 mL 31 gauge x 5/16 Syrg, Use as directed with ICI Therapy, Disp: 10 each, Rfl: 12    INVOKANA 300 mg Tab " tablet, , Disp: , Rfl:     lorazepam (ATIVAN) 0.5 MG tablet, Take 0.5 mg by mouth every 6 (six) hours as needed for Anxiety., Disp: , Rfl:     NOVOLOG FLEXPEN U-100 INSULIN 100 unit/mL (3 mL) InPn pen, , Disp: , Rfl:     papaverine 30 mg/mL injection, Add: Phentolamine 10 mg Add: PGE1 100 mcg  Sig:  Inject 10 units (0.10 mls) as directed, Disp: 10 mL, Rfl: 11    polyethylene glycol (GLYCOLAX) 17 gram/dose powder, , Disp: , Rfl:     sildenafil (REVATIO) 20 mg Tab, TAKE 1-3 TABLETS 30 MINUTES PRIOR TO INTERCOURSE. MAX OF 5., Disp: 30 tablet, Rfl: 11    STRATTERA 80 mg capsule, , Disp: , Rfl:     tadalafil (CIALIS) 5 MG tablet, Take 1 PO QOD for prostate cancer ED, Disp: 30 tablet, Rfl: 11    valsartan (DIOVAN) 160 MG tablet, , Disp: , Rfl:     pentoxifylline (TRENTAL) 400 mg TbSR, Take 1 tablet (400 mg total) by mouth 3 (three) times daily with meals., Disp: 90 tablet, Rfl: 5    Current Facility-Administered Medications:     [START ON 3/17/2020] testosterone Pllt 12 Pellet, 12 Pellet, Implant, 1 time in Clinic/HOD, Denis Goodwin MD      PHYSICAL EXAMINATION:    The patient generally appears in good health, is appropriately interactive, and is in no apparent distress.     Eyes: anicteric sclerae, moist conjunctivae; no lid-lag; PERRLA     HENT: Atraumatic; oropharynx clear with moist mucous membranes and no mucosal ulcerations;normal hard and soft palate. No evidence of lymphadenopathy.    Neck: Trachea midline.  No thyromegaly.    Musculoskeletal: No abnormal gait.    Skin: No lesions.    Mental: Cooperative with normal affect.  Is oriented to time, place, and person.    Neuro: Grossly intact.    Chest: Normal inspiratory effort.   No accessory muscles.  No audible wheezes.  Respirations symmetric on inspiration and expiration.    Heart: Regular rhythm.      Abdomen:  Soft, non-tender. No masses or organomegaly. Bladder is not palpable. No evidence of flank discomfort. No evidence of inguinal  hernia.    Genitourinary: The penis is circumcised with a plaque c/w peyronie's on the shaft. The urethral meatus is normal. The testes, epididymides, and cord structures are normal in size and contour bilaterally. The scrotum is normal in size and contour.    Extremities: No clubbing, cyanosis, or edema        LABS:    UA dipped negative today  Lab Results   Component Value Date    PSADIAG <0.01 05/22/2019    PSADIAG <0.01 11/07/2018    PSADIAG <0.01 04/30/2018        IMPRESSION:    Encounter Diagnoses   Name Primary?    Erectile dysfunction following radical prostatectomy Yes    Peyronie's disease     Prostate cancer     Essential hypertension     Type 2 diabetes mellitus without complication, with long-term current use of insulin      DM, controlled  HTN, controlled    PLAN:    1. Discussed that TRT after RALP is controversial.  He's willing to accept the risk.   2. Discussed options for his ED.  He'd like an IPP.  Will check an A1c.  Will plan for IPP with modeling.  3. Continue testopel.  4. Patient read the IPP handout and understands the risks associated with this procedure. He knows the risk of infection as well as surgery requirements if it were to become infected. He understands the impact on spontaneous and nocturnal erections, as well as need for replacement and repair, which was clearly outlined in verbal and written form. He was given the chance to ask questions and alternatives were discussed.  5. Risks discussed were You have elected to undergo placement of a penile prosthesis. Several devices are currently available in the marketplace, including those which are non-inflatable, versus two- or three-piece inflatable prostheses. All of these devices have the capacity to give you the opportunity to have a rigid penis on demand and to be used as frequently and as long as you would like. There are, therefore, many advantages to the use of the prosthesis which you have already discussed with your  physician. The goal of this informed consent form is to identify the potential problems that have been recognized to occur with penile prosthesis placement and that you understand the risks of undergoing prosthetic placement. It is important to recognize that as a result of improvements in design as well as better surgical technique, that all of the risks listed below are less than they were even 10 years ago. It is also important to recognize that most of the available studies report on historical data for devices which are now either not manufactured or have undergone structural improvements to reduce those side effects. The complications are simply noted in random order and do not reflect their frequency.    1. Prosthesis mechanical failure occurs as a result of leakage of fluid from the inflatable types of devices. This typically occurs as a result of a fracture in the tubing, most commonly as it emerges out of the scrotal pump, but it can also be due to a leak from the erectile cylinders in the penis. It is felt that this occurs as a result of repetitive mechanical trauma, which weakens the tubing and causes it to crack. When the fluid leaks, it is not something you would be aware of. It does not cause pain. The fluid contained within the device is typically a sterile saline solution that will simply be reabsorbed by the body. What you will recognize is that when you squeeze the pump, there will be no transfer of fluid and no rigidity or inadequate rigidity. The most recent long-term data looking at 5-10 year success reports mechanical failure in the 5-10% range over that period of time. Looked at another way, 90-95% of men will have a functional prosthesis in 10 years. These devices are designed to be used for life. Each company has its own warrantee which you should discuss with your physician.    2. Infection is a disastrous complication which usually requires the removal of the prosthesis, as it is rare to  "be able to clear infection so long as the prosthesis is within the body. Occasionally, the infected prosthesis can be removed, the location of the device can be washed out vigorously with a special antibiotic salvage procedure and then a new device may be able to be immediately placed. When this is not possible, the infected device is removed and then a period of healing will follow where the device can be replaced after a period of healing (6 weeks to 6 months). Delayed replacement of a prosthesis after initial removal is a more complicated operation associated with the potential for not being able to replace the device because of scarring but other problems such as shortening of the penis or change in sensation and shape may also occur.    As a result of improved surgical technique and device design, infection rates are now markedly reduced, typically being reported in the <1-2% range for new prosthesis placements and up to 3% when the prosthesis is uninfected and has mechanically failed.    3. Bleeding and hematoma are rarely a problem. There is likely to be localized swelling as well as "black and blue" of the penis, groin area, and scrotal sack. These will resolve without treatment over 1-3 weeks. Rarely a scrotal hematoma (collection of blood) will develop which can be treated with rest; it will reabsorb with time or, if it is growing in size or painful, it may need to be evacuated surgically (opened up and washed out). The risk of needing a blood transfusion after penile prosthesis placement is extremely rare to nonexistent.    4. Post-operative pain is variable and can be minimal, but in most men it is quite significant. Your physician will provide you with adequate pain medication to help control the pain, but not necessarily eliminate it entirely. As the prosthesis heals, there will be complete resolution of the pain, such that you will typically not even be aware that the prosthesis is within your body " unless you were to touch it directly. Complete pain resolution will most commonly occur within 4-12 weeks postoperatively. Post-operative pain is typically managed with oral narcotic agents. You should be aware that these drugs can cause constipation as well as sleepiness; therefore, you should not be in any position where you might need to make important decision or driving until the pain is under better control without the need for narcotic pain killers. It is recommended that during the first several days after the operation, that you spend as little time as possible on your feet, as this will encourage healing, reduce swelling, and result in more rapid resolution of pain.    5. Loss of length -  Penile shortening is probably the reason that most men are disappointed with the outcome from their prosthesis surgery. Studies have shown that when the flaccid penile stretched length is measured preoperatively, that the actual loss of length following placement of the prosthesis is on average no more than one centimeter (1/3 inch). To reduce the likelihood of loss of length, the surgeon will do his best to place the proper size device that fits your penis. You can expect that the length of your penis will be much like what you see when you grab the head of the penis and pull it straight out away from your body. Many men who believe they have lost length in their penis following prosthesis surgery in fact did not really lose length because of the surgery, but rather because they have not had had a full erection for some time during which there may have been some loss of tissue elasticity and thereby shortening of the penis. In addition, they may have gained some weight in the pubic area which will cover the penis and make it look shorter. Lastly, they may be expecting that the postoperative result will be like the erections they had when they were a much younger man. Although the goal is to make the penis as long as  possible, one can expect that the rigidity of the penis will be much like the erections obtained as a younger man.    6. Decreased girth - Girth is typically not substantially changed as most cylinders can expand and fill the penis satisfactorily, but if there has been scarring within the tissues of the penis, this can prevent expansion and result in a narrower appearing penis. The surgeon will do his best to put the largest cylinder in the penis, but there are limitations to which the tissues can expand. Decreased girth is not a common complaint.    7. Sensory loss - By and large the surgical techniques being used to avoid injury to the sensory nerves of the penis. Therefore, there is rarely any significant change in the sexual sensation of the penis. Some men find that it takes longer for them to experience orgasm. This may occur because they can simply inflate the penis without any sexuall arousal, and then it will seem to take longer for them to become aroused, resulting in the prolonged time to orgasm. Therefore, proper sexual stimulation is important to enjoy the entire sexual experience with a prosthesis.    8. Change in shape - The overall shape or configuration of the penis is rarely altered as a result of placement of any type of prosthesis, but if there is some unidentified scarring involving the penis such as that which occurs with Peyronie's disease, some curvature or indentations including hourglass narrowing can be identified along the shaft. Typically, in the postoperative period, the prosthesis will cause an internal tissue expansion which will correct these deformities. This may take 6-9 months occur.    9. Erosion or cylinder extrusion - If the tissues in the tips of the penis are weakened either by previous internal penile disease or as a result of the surgery, the prosthetic cylinder may migrate distally into the head of the penis and may appear to be ready to poke through the skin or the  "urethra. By all means, if such an irregularity is seen, one should address it with your doctor before the prosthesis is exposed so that it can be corrected without developing infection. This problem occurs in <1% of cases.    10. Pump problems - These include difficulty in activating or deactivating the pump as well as change of pump location due to migration or fixation of the pump to the scrotal skin. These problems are also quite unusual but can happen as a result of an altered healing process or a malposition of the pump by the implanting surgeon. If the pump were to migrate or become fixed or difficult to manipulate because of its position, a simple outpatient scrotal procedure can be performed to reposition the pump which is almost universally successful. This procedure is performed in no more than 1% of cases.    Prosthesis care - In the postoperative period, it is best to take the antibiotics prescribed by your physician, reduce your physical activity to reduce swelling and enhance healing, take pain medicine for your comfort, and avoid submergingthe incision during bathing for a minimum of 1-4 weeks. Specific bathing instructions will be issued by your physician. It is not uncommon to have an inflatable prosthesis partially fill during the postoperative period involuntarily. This is called "auto-inflation." To prevent this problem, it is important that once the prosthesis is activated, which is typically 4-6 weeks after surgery, that you perform what is known as "cycling" of the device. Cycling means complete inflation and then complete deflation of the penile cylinders twice per day for one month. In doing this, the tissues around the prosthesis are softened and stretched allowing complete deflation of the device into the reservoir. It also will allow you to become more familiar with operating the device and make it easier for you to activate it quickly and inconspicuously when you want to engage in sexual " relations. If you have an inflatable device with a scrotal pump, it is best to inflate it without twisting it. The repetitive twisting of the pump can weaken the connections between the tubing and the pump and is the most common cause for mechanical failure of the prosthesis.    It is hoped that this review will inform you of the potential problems associated with your prosthesis and as a result, will make for more realistic expectations regarding the outcome.          Copy to:

## 2019-10-30 ENCOUNTER — OFFICE VISIT (OUTPATIENT)
Dept: UROLOGY | Facility: CLINIC | Age: 59
End: 2019-10-30
Payer: COMMERCIAL

## 2019-10-30 DIAGNOSIS — N52.31 ERECTILE DYSFUNCTION AFTER RADICAL PROSTATECTOMY: ICD-10-CM

## 2019-10-30 PROCEDURE — 99499 NO LOS: ICD-10-PCS | Mod: S$GLB,,, | Performed by: UROLOGY

## 2019-10-30 PROCEDURE — 99499 UNLISTED E&M SERVICE: CPT | Mod: S$GLB,,, | Performed by: UROLOGY

## 2019-10-30 NOTE — PROGRESS NOTES
Urology ([facility]) H&P  Staff: Denis Goodwin MD    Is this patient in a research study?  Yes    CC: erectile dysfunction    HPI:  Rickey Rios is a 59 y.o. male with s/p RALP on 9/8/2017 by Dr. Burns.  Since then, he c/o ED.  Prior to surgery, he did have ED mild ED and was on TRT due to fatigue.  He's on testopel currently.  He understands that this is controversial.     He's tried and failed oral meds and ICI for  His ED.  He'd like an IPP, but his A1c has been too high (none recently; last in 2017 was 8.3%.)     He was also told that he had peyronie's.  He noticed a curve to his penis > 1 year ago.  No difficulty with penetration due to the curve. He doesn't know the degree of curve as he hasn't had a good erection.     He does not have CJ.  He is wearing 0 pads/day.  No hematuria.  No dysuria.  Good FOS.     No bone pain or weight loss.    He takes ASA for cardiovascular prophylaxis. No history of CVA or CAD.     Strength: 125  Pinch Strength: 20    ROS:  Neg except per HPI    Past Medical History:   Diagnosis Date    ADD (attention deficit disorder)     Anxiety     Arthritis     Diabetes mellitus, type 2     Dyslexia     H/O elbow surgery 10/2017    Hypertension     preventative     BLAIRE on CPAP     Prostate cancer 8/24/2017       Past Surgical History:   Procedure Laterality Date    BACK SURGERY      HEEL SPUR SURGERY      NECK SURGERY      PROSTATE SURGERY         Social History     Socioeconomic History    Marital status:      Spouse name: Not on file    Number of children: 4    Years of education: Not on file    Highest education level: Not on file   Occupational History    Occupation: [employer] employee   Social Needs    Financial resource strain: Not on file    Food insecurity:     Worry: Not on file     Inability: Not on file    Transportation needs:     Medical: Not on file     Non-medical: Not on file   Tobacco Use    Smoking status: Never Smoker    Smokeless  "tobacco: Never Used   Substance and Sexual Activity    Alcohol use: No    Drug use: No    Sexual activity: Yes     Partners: Female   Lifestyle    Physical activity:     Days per week: Not on file     Minutes per session: Not on file    Stress: Not on file   Relationships    Social connections:     Talks on phone: Not on file     Gets together: Not on file     Attends Faith service: Not on file     Active member of club or organization: Not on file     Attends meetings of clubs or organizations: Not on file     Relationship status: Not on file   Other Topics Concern    Not on file   Social History Narrative    Not on file       Family History   Problem Relation Age of Onset    Pulmonary fibrosis Mother     Diabetes Father     Atrial fibrillation Brother        Review of patient's allergies indicates:   Allergen Reactions    Phenergan [promethazine] Anaphylaxis and Edema     Throat/mouth swelling    Zofran [ondansetron hcl (pf)] Anaphylaxis, Hives and Edema     Throat swelling & mouth    Metformin Nausea And Vomiting       Current Outpatient Medications on File Prior to Visit   Medication Sig Dispense Refill    atorvastatin (LIPITOR) 10 MG tablet       BD ULTRA-FINE BERNARDO PEN NEEDLES 32 gauge x 5/32" Ndle       buPROPion (WELLBUTRIN XL) 300 MG 24 hr tablet       FREESTYLE LANCETS 28 gauge lancets       FREESTYLE LITE STRIPS Strp       glimepiride (AMARYL) 4 MG tablet       indomethacin (INDOCIN SR) 75 mg CpSR CR capsule       insulin glargine, TOUJEO, (TOUJEO SOLOSTAR) 300 unit/mL (1.5 mL) InPn pen Inject into the skin.      insulin syringe-needle U-100 1 mL 31 gauge x 5/16 Syrg Use as directed with ICI Therapy 10 each 12    INVOKANA 300 mg Tab tablet       lorazepam (ATIVAN) 0.5 MG tablet Take 0.5 mg by mouth every 6 (six) hours as needed for Anxiety.      NOVOLOG FLEXPEN U-100 INSULIN 100 unit/mL (3 mL) InPn pen       papaverine 30 mg/mL injection Add: Phentolamine 10 mg  Add: PGE1 100 " "mcg    Sig:  Inject 10 units (0.10 mls) as directed 10 mL 11    pentoxifylline (TRENTAL) 400 mg TbSR Take 1 tablet (400 mg total) by mouth 3 (three) times daily with meals. 90 tablet 5    polyethylene glycol (GLYCOLAX) 17 gram/dose powder       sildenafil (REVATIO) 20 mg Tab TAKE 1-3 TABLETS 30 MINUTES PRIOR TO INTERCOURSE. MAX OF 5. 30 tablet 11    STRATTERA 80 mg capsule       tadalafil (CIALIS) 5 MG tablet Take 1 PO QOD for prostate cancer ED 30 tablet 11    valsartan (DIOVAN) 160 MG tablet        Current Facility-Administered Medications on File Prior to Visit   Medication Dose Route Frequency Provider Last Rate Last Dose    [START ON 3/17/2020] testosterone Pllt 12 Pellet  12 Pellet Implant 1 time in Clinic/HOD Denis Goodwin MD           Anticoagulation:  Yes ASA 81 mg - will hold for 7 days prior to surgery    Physical Exam:  Estimated body mass index is 34.32 kg/m² as calculated from the following:    Height as of 10/28/19: 6' 1" (1.854 m).    Weight as of 10/28/19: 118 kg (260 lb 2.3 oz).    General: No acute distress, well developed. AAOx3  Head: Normocephalic, Atraumatic  Eyes: Extra-occular movements intact, No discharge  Neck: supple, symmetrical, trachea midline  Lungs: normal respiratory effort, no respiratory distress, no wheezes  CV: regular rate, 2+ pulses  Abdomen: soft, non-tender, non-distended, no organomegaly  : circumcised penis with orthotopic meatus, testes descended bilaterally, bilateral epididymides and spermatic cords normal  MSK: no edema, no deformities, normal ROM  Skin: skin color, texture, turgor normal; no wounds or rashes noted  Neurologic: no focal deficits, sensation intact    Labs:    Urine dipstick today - negative for blood, nitrites, and LE    Lab Results   Component Value Date    WBC 6.06 05/22/2019    HGB 14.8 05/22/2019    HCT 44.8 05/22/2019    MCV 85 05/22/2019     05/22/2019           BMP  Lab Results   Component Value Date     (L) 09/17/2017 "     (L) 09/17/2017    K 4.2 09/17/2017    K 4.2 09/17/2017    CL 97 09/17/2017    CL 97 09/17/2017    CO2 26 09/17/2017    CO2 26 09/17/2017    BUN 25 (H) 09/17/2017    BUN 25 (H) 09/17/2017    CREATININE 1.2 09/17/2017    CREATININE 1.2 09/17/2017    CALCIUM 9.4 09/17/2017    CALCIUM 9.4 09/17/2017    ANIONGAP 8 09/17/2017    ANIONGAP 8 09/17/2017    ESTGFRAFRICA >60.0 09/17/2017    ESTGFRAFRICA >60.0 09/17/2017    EGFRNONAA >60.0 09/17/2017    EGFRNONAA >60.0 09/17/2017       No results found for: PSA    Imaging:  n/a    Assessment: Rickey Rios is a 59 y.o. male with ED s/p RALP    Plan:     1. To OR on 11/12/19 for IPP  2. Consents signed   3. I have explained the risk, benefits, and alternatives of the procedure in detail. The patient voices understanding and all questions have been answered. The patient agrees to proceed as planned.   4. Will obtain HbA1c prior to surgery - if too high, will postpone surgery.        [facility] study  IRB # 2017.001  ICF note         Met with patient to discuss possible participation in the IPP research study   Patient was given a copy of the informed consent form for review   Patient read the informed consent form in full   All risks, benefits, alternative therapies, confidentiality and study requirements were discussed   Ample opportunity was provided for questions and answers   Patient verbalized that all questions were satisfactorily answered and their understanding of the protocol and its requirements   Patient was provided with research coordinator and MD contact information for future questions or concerns   Patient denied involvement in any other research study   Informed consent was signed and patient was provided with a signed consent form for their records   Consent was obtained prior to conducting any study-related procedures   Subject meets all Inclusion Criteria and no Exclusion Criteria      Once consent was signed, the following questionnaires  were completed with the subject:  1. Non-Validated questionnaire   2. IIEF        Aayush De Guzman MD

## 2019-10-31 ENCOUNTER — TELEPHONE (OUTPATIENT)
Dept: UROLOGY | Facility: CLINIC | Age: 59
End: 2019-10-31

## 2019-10-31 NOTE — TELEPHONE ENCOUNTER
Spoke to pt. Pt inquiring if approval received for ipp. Informed pt I would check with surgery scheduler. He verbalized understanding.

## 2019-11-06 ENCOUNTER — LAB VISIT (OUTPATIENT)
Dept: LAB | Facility: HOSPITAL | Age: 59
End: 2019-11-06
Attending: UROLOGY
Payer: COMMERCIAL

## 2019-11-06 DIAGNOSIS — Z79.4 TYPE 2 DIABETES MELLITUS WITHOUT COMPLICATION, WITH LONG-TERM CURRENT USE OF INSULIN: ICD-10-CM

## 2019-11-06 DIAGNOSIS — E11.9 TYPE 2 DIABETES MELLITUS WITHOUT COMPLICATION, WITH LONG-TERM CURRENT USE OF INSULIN: ICD-10-CM

## 2019-11-06 LAB
ESTIMATED AVG GLUCOSE: 174 MG/DL (ref 68–131)
HBA1C MFR BLD HPLC: 7.7 % (ref 4–5.6)

## 2019-11-06 PROCEDURE — 36415 COLL VENOUS BLD VENIPUNCTURE: CPT | Mod: PO

## 2019-11-06 PROCEDURE — 83036 HEMOGLOBIN GLYCOSYLATED A1C: CPT

## 2019-11-08 ENCOUNTER — TELEPHONE (OUTPATIENT)
Dept: UROLOGY | Facility: CLINIC | Age: 59
End: 2019-11-08

## 2019-11-08 DIAGNOSIS — N52.31 ERECTILE DYSFUNCTION AFTER RADICAL PROSTATECTOMY: Primary | ICD-10-CM

## 2019-11-11 ENCOUNTER — TELEPHONE (OUTPATIENT)
Dept: UROLOGY | Facility: CLINIC | Age: 59
End: 2019-11-11

## 2019-11-11 RX ORDER — ASPIRIN 81 MG/1
81 TABLET ORAL EVERY MORNING
COMMUNITY

## 2019-11-11 NOTE — PRE-PROCEDURE INSTRUCTIONS
PREOP INSTRUCTIONS:No solid food ,milk or milk products for 8 hours prior to procedure.Clear liquids are allowed up to 2 hours before procedure.Clear liquids are:water,apple juice,pedialyte,gatorade,& jello.Shower instructions as well as directions to the 2nd floor [facility] Center were given.Patient encouraged to wear loose fitting,comfortable clothing .Medication instructions for pm prior to and am of procedure reviewed to the best of his ability.Patient stated that his wife distributes his medications to him and he isn't positive which ones he takes and which he doesn't.He is sure about his diabetic medications as well as his aspirin.Patient also stated that he planned to take no medications the morning of his surgery.Patient reported that he no longer takes Invokana and has not been taking Novolog or Toujeo insulin.The only diabetic medication he currently takes is Glimeperide  Patient stated that he has adjusted his diet to help control his blood sugar and his blood sugar has been around 119      Patient denies any side effects or issues with anesthesia or sedation

## 2019-11-11 NOTE — PRE-PROCEDURE INSTRUCTIONS
PREOP INSTRUCTIONS:No solid food ,milk or milk products for 8 hours prior to procedure.Clear liquids are allowed up to 2 hours beforeprocedure.Clear liquids are:water,apple juice,pedialyte,gatorade,& jello.Shower instructions as well as directions to the 2nd floor Surgery Center were given.Patient encouraged to wear loose fitting,comfortable clothing .Medication instructions for pm prior to and am of procedure reviewed to the best of his ability.Patient stated that his wife distributes his medications to him and he isn't positive which ones he takes and which he doesn't.He is sure about his diabetic medications as well as his aspirin.Patient also stated that he planned to take no medications the morning of his surgery.Patient reported that he no longer takes Invokana and has not been taking Novolog or Toujeo insulin.His only diabetic medication he currently takes is Glimeperide and he has controlled his blood sugar by diet.    Patient denies any side effects or issues with anesthesia or sedation.

## 2019-11-11 NOTE — TELEPHONE ENCOUNTER
----- Message from Tiffanie Cueva MA sent at 11/8/2019  2:29 PM CST -----  Regarding: Lab results  Pt states that he has a surgery on 11/12 and he wants to know if results from lab is ok for surgery

## 2019-11-12 ENCOUNTER — HOSPITAL ENCOUNTER (OUTPATIENT)
Facility: HOSPITAL | Age: 59
Discharge: HOME | End: 2019-11-13
Attending: UROLOGY | Admitting: UROLOGY
Payer: COMMERCIAL

## 2019-11-12 ENCOUNTER — ANESTHESIA (OUTPATIENT)
Dept: SURGERY | Facility: HOSPITAL | Age: 59
End: 2019-11-12
Payer: COMMERCIAL

## 2019-11-12 ENCOUNTER — ANESTHESIA EVENT (OUTPATIENT)
Dept: SURGERY | Facility: HOSPITAL | Age: 59
End: 2019-11-12
Payer: COMMERCIAL

## 2019-11-12 DIAGNOSIS — E11.9 TYPE 2 DIABETES MELLITUS WITHOUT COMPLICATION, WITH LONG-TERM CURRENT USE OF INSULIN: ICD-10-CM

## 2019-11-12 DIAGNOSIS — N48.6 PEYRONIE'S DISEASE: ICD-10-CM

## 2019-11-12 DIAGNOSIS — C61 PROSTATE CANCER: ICD-10-CM

## 2019-11-12 DIAGNOSIS — N52.31 ERECTILE DYSFUNCTION AFTER RADICAL PROSTATECTOMY: Primary | ICD-10-CM

## 2019-11-12 DIAGNOSIS — Z79.4 TYPE 2 DIABETES MELLITUS WITHOUT COMPLICATION, WITH LONG-TERM CURRENT USE OF INSULIN: ICD-10-CM

## 2019-11-12 DIAGNOSIS — I10 ESSENTIAL HYPERTENSION: ICD-10-CM

## 2019-11-12 DIAGNOSIS — M51.9 LUMBAR DISC DISEASE: ICD-10-CM

## 2019-11-12 LAB
POCT GLUCOSE: 135 MG/DL (ref 70–110)
POCT GLUCOSE: 148 MG/DL (ref 70–110)
POCT GLUCOSE: 151 MG/DL (ref 70–110)
POCT GLUCOSE: 161 MG/DL (ref 70–110)
POCT GLUCOSE: 284 MG/DL (ref 70–110)

## 2019-11-12 PROCEDURE — 36000707: Performed by: UROLOGY

## 2019-11-12 PROCEDURE — 25000003 PHARM REV CODE 250: Performed by: NURSE ANESTHETIST, CERTIFIED REGISTERED

## 2019-11-12 PROCEDURE — 94761 N-INVAS EAR/PLS OXIMETRY MLT: CPT

## 2019-11-12 PROCEDURE — 27201423 OPTIME MED/SURG SUP & DEVICES STERILE SUPPLY: Performed by: UROLOGY

## 2019-11-12 PROCEDURE — D9220A PRA ANESTHESIA: Mod: CRNA,,, | Performed by: NURSE ANESTHETIST, CERTIFIED REGISTERED

## 2019-11-12 PROCEDURE — D9220A PRA ANESTHESIA: ICD-10-PCS | Mod: CRNA,,, | Performed by: NURSE ANESTHETIST, CERTIFIED REGISTERED

## 2019-11-12 PROCEDURE — 36000706: Performed by: UROLOGY

## 2019-11-12 PROCEDURE — 63600175 PHARM REV CODE 636 W HCPCS: Performed by: UROLOGY

## 2019-11-12 PROCEDURE — C1813 PROSTHESIS, PENILE, INFLATAB: HCPCS | Performed by: UROLOGY

## 2019-11-12 PROCEDURE — 71000039 HC RECOVERY, EACH ADD'L HOUR: Performed by: UROLOGY

## 2019-11-12 PROCEDURE — 25000003 PHARM REV CODE 250: Performed by: STUDENT IN AN ORGANIZED HEALTH CARE EDUCATION/TRAINING PROGRAM

## 2019-11-12 PROCEDURE — 63600175 PHARM REV CODE 636 W HCPCS: Performed by: NURSE ANESTHETIST, CERTIFIED REGISTERED

## 2019-11-12 PROCEDURE — 63600175 PHARM REV CODE 636 W HCPCS: Performed by: STUDENT IN AN ORGANIZED HEALTH CARE EDUCATION/TRAINING PROGRAM

## 2019-11-12 PROCEDURE — D9220A PRA ANESTHESIA: ICD-10-PCS | Mod: ANES,,, | Performed by: ANESTHESIOLOGY

## 2019-11-12 PROCEDURE — G0378 HOSPITAL OBSERVATION PER HR: HCPCS

## 2019-11-12 PROCEDURE — 82962 GLUCOSE BLOOD TEST: CPT | Performed by: UROLOGY

## 2019-11-12 PROCEDURE — D9220A PRA ANESTHESIA: Mod: ANES,,, | Performed by: ANESTHESIOLOGY

## 2019-11-12 PROCEDURE — 54405 INSERT MULTI-COMP PENIS PROS: CPT | Mod: ,,, | Performed by: UROLOGY

## 2019-11-12 PROCEDURE — 63600175 PHARM REV CODE 636 W HCPCS: Performed by: ANESTHESIOLOGY

## 2019-11-12 PROCEDURE — 37000009 HC ANESTHESIA EA ADD 15 MINS: Performed by: UROLOGY

## 2019-11-12 PROCEDURE — 71000033 HC RECOVERY, INTIAL HOUR: Performed by: UROLOGY

## 2019-11-12 PROCEDURE — 54405 PR INSERT,INFLATABLE PENILE PROSTHESIS: ICD-10-PCS | Mod: ,,, | Performed by: UROLOGY

## 2019-11-12 PROCEDURE — 37000008 HC ANESTHESIA 1ST 15 MINUTES: Performed by: UROLOGY

## 2019-11-12 DEVICE — KIT ACCESSORY PENILE: Type: IMPLANTABLE DEVICE | Site: PENIS | Status: FUNCTIONAL

## 2019-11-12 DEVICE — RESERVOIR FLAT I2 100ML: Type: IMPLANTABLE DEVICE | Site: PENIS | Status: FUNCTIONAL

## 2019-11-12 RX ORDER — GENTAMICIN SULFATE 40 MG/ML
INJECTION, SOLUTION INTRAMUSCULAR; INTRAVENOUS
Status: DISCONTINUED | OUTPATIENT
Start: 2019-11-12 | End: 2019-11-12 | Stop reason: HOSPADM

## 2019-11-12 RX ORDER — PROPOFOL 10 MG/ML
VIAL (ML) INTRAVENOUS
Status: DISCONTINUED | OUTPATIENT
Start: 2019-11-12 | End: 2019-11-12

## 2019-11-12 RX ORDER — DIPHENHYDRAMINE HYDROCHLORIDE 50 MG/ML
25 INJECTION INTRAMUSCULAR; INTRAVENOUS EVERY 6 HOURS PRN
Status: DISCONTINUED | OUTPATIENT
Start: 2019-11-12 | End: 2019-11-12 | Stop reason: HOSPADM

## 2019-11-12 RX ORDER — OXYCODONE HYDROCHLORIDE 5 MG/1
5 TABLET ORAL EVERY 4 HOURS PRN
Status: DISCONTINUED | OUTPATIENT
Start: 2019-11-12 | End: 2019-11-13 | Stop reason: HOSPADM

## 2019-11-12 RX ORDER — VANCOMYCIN HCL IN 5 % DEXTROSE 1G/250ML
1000 PLASTIC BAG, INJECTION (ML) INTRAVENOUS
Status: COMPLETED | OUTPATIENT
Start: 2019-11-12 | End: 2019-11-12

## 2019-11-12 RX ORDER — FENTANYL CITRATE 50 UG/ML
INJECTION, SOLUTION INTRAMUSCULAR; INTRAVENOUS
Status: DISCONTINUED | OUTPATIENT
Start: 2019-11-12 | End: 2019-11-12

## 2019-11-12 RX ORDER — IBUPROFEN 200 MG
24 TABLET ORAL
Status: DISCONTINUED | OUTPATIENT
Start: 2019-11-12 | End: 2019-11-13 | Stop reason: HOSPADM

## 2019-11-12 RX ORDER — ATORVASTATIN CALCIUM 10 MG/1
10 TABLET, FILM COATED ORAL DAILY
Status: DISCONTINUED | OUTPATIENT
Start: 2019-11-12 | End: 2019-11-13 | Stop reason: HOSPADM

## 2019-11-12 RX ORDER — KETOROLAC TROMETHAMINE 15 MG/ML
15 INJECTION, SOLUTION INTRAMUSCULAR; INTRAVENOUS ONCE
Status: COMPLETED | OUTPATIENT
Start: 2019-11-12 | End: 2019-11-12

## 2019-11-12 RX ORDER — FENTANYL CITRATE 50 UG/ML
25 INJECTION, SOLUTION INTRAMUSCULAR; INTRAVENOUS EVERY 5 MIN PRN
Status: COMPLETED | OUTPATIENT
Start: 2019-11-12 | End: 2019-11-12

## 2019-11-12 RX ORDER — ACETAMINOPHEN 325 MG/1
650 TABLET ORAL EVERY 6 HOURS
Status: DISCONTINUED | OUTPATIENT
Start: 2019-11-12 | End: 2019-11-13 | Stop reason: HOSPADM

## 2019-11-12 RX ORDER — MIDAZOLAM HYDROCHLORIDE 1 MG/ML
INJECTION, SOLUTION INTRAMUSCULAR; INTRAVENOUS
Status: DISCONTINUED | OUTPATIENT
Start: 2019-11-12 | End: 2019-11-12

## 2019-11-12 RX ORDER — ROCURONIUM BROMIDE 10 MG/ML
INJECTION, SOLUTION INTRAVENOUS
Status: DISCONTINUED | OUTPATIENT
Start: 2019-11-12 | End: 2019-11-12

## 2019-11-12 RX ORDER — GENTAMICIN SULFATE 100 MG/100ML
INJECTION, SOLUTION INTRAVENOUS
Status: DISCONTINUED | OUTPATIENT
Start: 2019-11-12 | End: 2019-11-12

## 2019-11-12 RX ORDER — LIDOCAINE HYDROCHLORIDE 20 MG/ML
JELLY TOPICAL
Status: DISCONTINUED | OUTPATIENT
Start: 2019-11-12 | End: 2019-11-13 | Stop reason: HOSPADM

## 2019-11-12 RX ORDER — HYDROMORPHONE HYDROCHLORIDE 1 MG/ML
0.2 INJECTION, SOLUTION INTRAMUSCULAR; INTRAVENOUS; SUBCUTANEOUS EVERY 5 MIN PRN
Status: DISCONTINUED | OUTPATIENT
Start: 2019-11-12 | End: 2019-11-12 | Stop reason: HOSPADM

## 2019-11-12 RX ORDER — PANTOPRAZOLE SODIUM 40 MG/1
40 TABLET, DELAYED RELEASE ORAL DAILY
Status: DISCONTINUED | OUTPATIENT
Start: 2019-11-12 | End: 2019-11-13 | Stop reason: HOSPADM

## 2019-11-12 RX ORDER — OXYCODONE HYDROCHLORIDE 10 MG/1
10 TABLET ORAL EVERY 4 HOURS PRN
Status: DISCONTINUED | OUTPATIENT
Start: 2019-11-12 | End: 2019-11-13 | Stop reason: HOSPADM

## 2019-11-12 RX ORDER — VANCOMYCIN HYDROCHLORIDE 1 G/20ML
INJECTION, POWDER, LYOPHILIZED, FOR SOLUTION INTRAVENOUS
Status: DISCONTINUED | OUTPATIENT
Start: 2019-11-12 | End: 2019-11-12 | Stop reason: HOSPADM

## 2019-11-12 RX ORDER — IBUPROFEN 200 MG
16 TABLET ORAL
Status: DISCONTINUED | OUTPATIENT
Start: 2019-11-12 | End: 2019-11-13 | Stop reason: HOSPADM

## 2019-11-12 RX ORDER — VASOPRESSIN 20 [USP'U]/ML
INJECTION, SOLUTION INTRAMUSCULAR; SUBCUTANEOUS
Status: DISCONTINUED | OUTPATIENT
Start: 2019-11-12 | End: 2019-11-12

## 2019-11-12 RX ORDER — SODIUM CHLORIDE 9 MG/ML
INJECTION, SOLUTION INTRAVENOUS CONTINUOUS PRN
Status: DISCONTINUED | OUTPATIENT
Start: 2019-11-12 | End: 2019-11-12

## 2019-11-12 RX ORDER — GLUCAGON 1 MG
1 KIT INJECTION
Status: DISCONTINUED | OUTPATIENT
Start: 2019-11-12 | End: 2019-11-13 | Stop reason: HOSPADM

## 2019-11-12 RX ORDER — EPHEDRINE SULFATE 50 MG/ML
INJECTION, SOLUTION INTRAVENOUS
Status: DISCONTINUED | OUTPATIENT
Start: 2019-11-12 | End: 2019-11-12

## 2019-11-12 RX ORDER — LIDOCAINE HCL/PF 100 MG/5ML
SYRINGE (ML) INTRAVENOUS
Status: DISCONTINUED | OUTPATIENT
Start: 2019-11-12 | End: 2019-11-12

## 2019-11-12 RX ORDER — SODIUM CHLORIDE 9 MG/ML
INJECTION, SOLUTION INTRAVENOUS CONTINUOUS
Status: ACTIVE | OUTPATIENT
Start: 2019-11-12 | End: 2019-11-13

## 2019-11-12 RX ORDER — INSULIN ASPART 100 [IU]/ML
1-10 INJECTION, SOLUTION INTRAVENOUS; SUBCUTANEOUS
Status: DISCONTINUED | OUTPATIENT
Start: 2019-11-12 | End: 2019-11-13 | Stop reason: HOSPADM

## 2019-11-12 RX ADMIN — VANCOMYCIN HYDROCHLORIDE 1000 MG: 1 INJECTION, POWDER, FOR SOLUTION INTRAVENOUS at 09:11

## 2019-11-12 RX ADMIN — FENTANYL CITRATE 25 MCG: 50 INJECTION, SOLUTION INTRAMUSCULAR; INTRAVENOUS at 02:11

## 2019-11-12 RX ADMIN — EPHEDRINE SULFATE 25 MG: 50 INJECTION, SOLUTION INTRAMUSCULAR; INTRAVENOUS; SUBCUTANEOUS at 12:11

## 2019-11-12 RX ADMIN — SODIUM CHLORIDE: 0.9 INJECTION, SOLUTION INTRAVENOUS at 11:11

## 2019-11-12 RX ADMIN — FENTANYL CITRATE 100 MCG: 50 INJECTION, SOLUTION INTRAMUSCULAR; INTRAVENOUS at 10:11

## 2019-11-12 RX ADMIN — GENTAMICIN SULFATE 240 MG: 40 INJECTION, SOLUTION INTRAMUSCULAR; INTRAVENOUS at 09:11

## 2019-11-12 RX ADMIN — LIDOCAINE HYDROCHLORIDE 50 MG: 20 INJECTION, SOLUTION INTRAVENOUS at 10:11

## 2019-11-12 RX ADMIN — FENTANYL CITRATE 25 MCG: 50 INJECTION, SOLUTION INTRAMUSCULAR; INTRAVENOUS at 01:11

## 2019-11-12 RX ADMIN — ROCURONIUM BROMIDE 20 MG: 10 INJECTION, SOLUTION INTRAVENOUS at 11:11

## 2019-11-12 RX ADMIN — HYDROMORPHONE HYDROCHLORIDE 0.2 MG: 1 INJECTION, SOLUTION INTRAMUSCULAR; INTRAVENOUS; SUBCUTANEOUS at 02:11

## 2019-11-12 RX ADMIN — ATORVASTATIN CALCIUM 10 MG: 10 TABLET, FILM COATED ORAL at 01:11

## 2019-11-12 RX ADMIN — SUGAMMADEX 400 MG: 100 INJECTION, SOLUTION INTRAVENOUS at 01:11

## 2019-11-12 RX ADMIN — INSULIN ASPART 2 UNITS: 100 INJECTION, SOLUTION INTRAVENOUS; SUBCUTANEOUS at 06:11

## 2019-11-12 RX ADMIN — OXYCODONE HYDROCHLORIDE 10 MG: 10 TABLET ORAL at 01:11

## 2019-11-12 RX ADMIN — PANTOPRAZOLE SODIUM 40 MG: 40 TABLET, DELAYED RELEASE ORAL at 01:11

## 2019-11-12 RX ADMIN — VANCOMYCIN HYDROCHLORIDE 1500 MG: 1.5 INJECTION, POWDER, LYOPHILIZED, FOR SOLUTION INTRAVENOUS at 08:11

## 2019-11-12 RX ADMIN — OXYCODONE HYDROCHLORIDE 10 MG: 10 TABLET ORAL at 08:11

## 2019-11-12 RX ADMIN — SODIUM CHLORIDE: 0.9 INJECTION, SOLUTION INTRAVENOUS at 10:11

## 2019-11-12 RX ADMIN — SODIUM CHLORIDE: 0.9 INJECTION, SOLUTION INTRAVENOUS at 01:11

## 2019-11-12 RX ADMIN — MIDAZOLAM HYDROCHLORIDE 2 MG: 1 INJECTION, SOLUTION INTRAMUSCULAR; INTRAVENOUS at 10:11

## 2019-11-12 RX ADMIN — ROCURONIUM BROMIDE 50 MG: 10 INJECTION, SOLUTION INTRAVENOUS at 10:11

## 2019-11-12 RX ADMIN — VANCOMYCIN HYDROCHLORIDE 1 G: 1 INJECTION, POWDER, LYOPHILIZED, FOR SOLUTION INTRAVENOUS at 10:11

## 2019-11-12 RX ADMIN — VASOPRESSIN 2 UNITS: 20 INJECTION INTRAVENOUS at 12:11

## 2019-11-12 RX ADMIN — ACETAMINOPHEN 650 MG: 325 TABLET ORAL at 06:11

## 2019-11-12 RX ADMIN — KETOROLAC TROMETHAMINE 15 MG: 15 INJECTION, SOLUTION INTRAMUSCULAR; INTRAVENOUS at 10:11

## 2019-11-12 RX ADMIN — PROPOFOL 200 MG: 10 INJECTION, EMULSION INTRAVENOUS at 10:11

## 2019-11-12 RX ADMIN — SODIUM CHLORIDE: 0.9 INJECTION, SOLUTION INTRAVENOUS at 12:11

## 2019-11-12 RX ADMIN — GENTAMICIN SULFATE 240 MG: 1 INJECTION, SOLUTION INTRAVENOUS at 10:11

## 2019-11-12 NOTE — NURSING TRANSFER
Nursing Transfer Note      11/12/2019     Transfer To: 541a    Transfer via stretcher    Transfer with ivf, ice packs    Transported by pct    Medicines sent: ivf    Chart send with patient: Yes    Notified: spouse    Patient reassessed at: 11/12/19

## 2019-11-12 NOTE — PLAN OF CARE
Pt arrived to the floor via stretcher. Oriented to unit and room. Resting in bed comfortably. PIV line intact and infusing, free of infection and irritation. Fall precautions maintained, no falls noted. Call light within reach, bed locked and in lowest position. Non-skid socks on while out of bed. Patient instructed to call for assistance. Skin integrity maintained as patient is independent with frequent repositioning. C/o pain, managed with PRN meds, no other complaints or concerns. Progressing towards goals. Will continue to monitor and follow plan of care.

## 2019-11-12 NOTE — TRANSFER OF CARE
"Anesthesia Transfer of Care Note    Patient: Rickey Rios    Procedure(s) Performed: Procedure(s) (LRB):  INSERTION, PENILE PROSTHESIS, INFLATABLE (N/A)    Patient location: PACU    Anesthesia Type: general    Transport from OR: Transported from OR on 6-10 L/min O2 by face mask with adequate spontaneous ventilation    Post pain: adequate analgesia    Post assessment: no apparent anesthetic complications and tolerated procedure well    Post vital signs: stable    Level of consciousness: awake, confused and agitated    Nausea/Vomiting: no nausea/vomiting    Complications: none    Transfer of care protocol was followed      Last vitals:   Visit Vitals  BP (!) 154/83 (BP Location: Right arm, Patient Position: Lying)   Pulse 85   Temp 36.4 °C (97.6 °F) (Oral)   Resp 17   Ht 6' 2" (1.88 m)   Wt 117.9 kg (260 lb)   SpO2 98%   BMI 33.38 kg/m²     "

## 2019-11-12 NOTE — OP NOTE
[facility]  Operative Note    Date: 11/12/2019    Pre-Op Diagnosis:   1.  Erectile Dysfunction after prostatectomy     2. Peyronie's disease    Patient Active Problem List   Diagnosis    Essential hypertension    Cervical disc disease    Lumbar disc disease    Type 2 diabetes mellitus without complication, with long-term current use of insulin    Prostate cancer    Ankylosing spondylitis    AVIS (acute kidney injury)    Erectile dysfunction following radical prostatectomy    Male hypogonadism    Peyronie's disease    Erectile dysfunction after radical prostatectomy       Post-Op Diagnosis: Erectile Dysfunction    Procedure(s) Performed:   1.  Inflatable Penile Prosthesis ( CX 3-piece inflatable prosthesis)    Specimen(s): None     Staff Surgeon: Denis Goodwin MD, MD    Assistant Surgeon: Timur Short MD    Anesthesia: General endotracheal anesthesia    Indications:  Rickey Rios is a 59 y.o. male with a history of erectile dysfunction refractory to medical management.  After discussion with the patient, he has elected to undergo IPP.    Findings:    1.  17.5 cm cylinder was placed with 2.5 cm rear tip extender bilaterally  2.  95 mL reservoir  3.  Walsenburg placed ectopically on left    Estimated Blood Loss: Minimal    Drains: 16 Icelandic garrett catheter    Procedure in detail:  After risks and benefits of the procedure were discussed with the patient, informed consent was obtained.  The patient  was taken back to the operating room and placed in the supine position.  TEDs and SCDs were applied and functioning. Vancomycin and gentamicin were given preoperatively.  General anesthesia was induced.  The patient was prepped and scrubbed with betadine for a total of 10 minutes.  A chloraprep was then used as further prep.We subsequently scrubbed our hands for 10 minutes.  He was draped in the usual sterile fashion, and an ioban was placed over the field.  A 16 Fr garrett catheter  was placed using standard sterile technique and the bladder was drained.      An approximately 4 cm transverse incision was marked at the penoscrotal junction.  This was incised sharply with a 15 blade.  Bovie electrocautery was used to dissect down to Dartos fascia. Dartos was then incised using Metzenbaum scissors down to the level of the corpora.  A [place] ring retractor was utilized for retraction. The left corporal body was cleared of its adventitial tissues, exposing the tunica albuginea. The tunica albuginea was then marked for a corporotomy. 2 Stay sutures of 2-0 vicryl were then placed.  A corporotomy was then made between the stay sutures. The patient's corpora were dilated serially from 10 mm up to 13 mm using Tafoya dilators distally and Hegar dilators proximally. Care was taken to ensure not to crossover during this portion of the procedure.  Once dilated, the patient's corporotomy was irrigated with antibiotic irrigation. There was no evidence of urethral injury during this portion of the procedure.  The patient's corpora was then measured using the Mark dilator. It measured 17.5 cm in total length.    Attention was then turned to the contralateral corporal body, and the exact same technique was performed.  Again, the adventitial tissues around it were cleared, exposing the tunica albuginea and it was marked for a corporotomy with the bovie.  A corporotomy was made after  2-0 Vicryl stay sutures were applied to either side of the corporotomy. Again, it was serially dilated from 10 mm up to 13 mm sequentially using Tafoya and Hegar dilators.  Again, care was taken not to crossover.  Again the corporal length was measured using the Mark.  It measured 17.5 cm in total length.    A 15 cm IPP with 2.5 rear tip extender bilaterally was assembled.  The Mark was then advanced into the corpora and the needle fired through the glans, first on the left and then on the right.  The left IPP was then placed  within the corpora and the corpora was closed by tying the previously placed stay sutures.  The IPP was then placed on the right, and the corpora was closed by tying the previously placed stay sutures. Additional interrupted 2-0 Vicryl sutures were placed on the right corporotomy to assist with closure.     At this time, the device was inflated.  There was good glans support with no SST deformity and the penis had a very slight dorsal curve of ~20 degrees.  It was elected not to model this.    Attention was then turned to the patient's left inguinal region.  Due to the patients history of previous prostatectomy, the reservoir was placed ectopically in a sub-muscular position anterior to the transversalis fascia.  Blunt finger dissection was used to clear space for the reservoir.  A 100 mL reservoir was then advanced and filled to 95 mL.  There was no back pressure identified on filling.       Immediately following this, a subdartos pocket was made in the dependent portion of the scrotum. The pump device was then advanced into the dartos pouch and secured with a pursestring 2-0 Vicryl suture.  The pump, cylinders and tubing were then connected to the reservoir in the standard fashion.    The device was then inflated again. There was good glans support with no SST deformity and the penis was satisfactorily straight.    It should be noted that copious amounts of antibiotic irrigation was used throughout the case.    Dartos was reapproximated in three separate layers using 2-0 Vicryl in a running fashion.  Skin was then reapproximated using 3-0 chromic in a running horizontal mattress fashion.  Derma lemon was applied and a mummy wrap was created with scrotal fluffs and support.      The patient tolerated the procedure well and was transferred to the recovery room in stable condition.    Disposition:  The patient will be admitted to the urology service for overnight observation.  We plan for a voiding trial tomorrow  morning as well as morning IV antibiotics.  He will be discharged with PO antibiotics and instructions to not use his pump until instructed to do so by Dr. Goodwin.      Timur Short MD

## 2019-11-12 NOTE — INTERVAL H&P NOTE
The patient has been examined and the H&P has been reviewed:    I concur with the findings and no changes have occurred since H&P was written.    Anesthesia/Surgery risks, benefits and alternative options discussed and understood by patient/family.          Active Hospital Problems    Diagnosis  POA    Erectile dysfunction after radical prostatectomy [N52.31]  Yes      Resolved Hospital Problems   No resolved problems to display.

## 2019-11-12 NOTE — DISCHARGE INSTRUCTIONS
Postop instructions for IPP    Expect some bruising and swelling around scrotum for the next few days    No sex or masturbation x 6 weeks    You will be sent home with antibiotics x 1 week    Please keep penis pointed upwards towards your abdomen.  You can wear tight fitting underwear to assist with this.   When you are in bed or in a chair, you can elevate your scrotum.  Ice to scrotum 30 min on and 30 min off for 3-5 days post-op    Ok to shower in 2 days  No baths or soaking baths  No sitting in standing water until seen by your physician and given permission    Do not restart any blood thinners for the first few days unless told by your cardiologist or your physician specifically, if you have any questions call your physician    Return to ER if:  -you have fever  -see discharge from incisions or penis  -inability to void  -severe pain not controlled with pain meds  -any redness or concern for infection of your pump    Call your physician or urology clinic with any concerns

## 2019-11-12 NOTE — PLAN OF CARE
Pt vital signs wnl.  Pt verbalizes pain is tolerable.  Pt verbalizes no nausea.  Scrotal dressing intact with surgical underware and ice pack in place.  Mcdaniels remains patent.

## 2019-11-12 NOTE — ANESTHESIA PREPROCEDURE EVALUATION
11/12/2019  Rickey Rios is a 59 y.o., male.  Patient Active Problem List   Diagnosis    Essential hypertension    Cervical disc disease    Lumbar disc disease    Type 2 diabetes mellitus without complication, with long-term current use of insulin    Prostate cancer    Ankylosing spondylitis    AVIS (acute kidney injury)    Erectile dysfunction following radical prostatectomy    Male hypogonadism    Peyronie's disease         Anesthesia Evaluation         Review of Systems      Physical Exam  General:  Well nourished    Airway/Jaw/Neck:  Airway Findings: Mouth Opening: Normal Tongue: Normal  General Airway Assessment: Adult  Mallampati: II  Improves to II with phonation.  TM Distance: Normal, at least 6 cm      Dental:  Dental Findings: In tact   Chest/Lungs:  Chest/Lungs Findings: Clear to auscultation     Heart/Vascular:  Heart Findings: Rate: Normal  Rhythm: Regular Rhythm  Sounds: Normal        Mental Status:  Mental Status Findings:  Cooperative, Alert and Oriented         Anesthesia Plan  Type of Anesthesia, risks & benefits discussed:  Anesthesia Type:  general  Patient's Preference: General  Intra-op Monitoring Plan: standard ASA monitors  Intra-op Monitoring Plan Comments: Standard ASA monitors.   Post Op Pain Control Plan: per primary service following discharge from PACU  Post Op Pain Control Plan Comments: Per primary service.     Induction:   IV  Beta Blocker:  Patient is not currently on a Beta-Blocker (No further documentation required).       Informed Consent: Patient understands risks and agrees with Anesthesia plan.  Questions answered. Anesthesia consent signed with patient.  ASA Score: 3     Day of Surgery Review of History & Physical:    H&P update referred to the surgeon.     Anesthesia Plan Notes: Chart reviewed, patient interviewed and examined.  The plan for general  anesthesia was explained.  Questions were answered and the consent was signed.  Amisha STRONG         Ready For Surgery From Anesthesia Perspective.

## 2019-11-13 VITALS
OXYGEN SATURATION: 95 % | WEIGHT: 260 LBS | RESPIRATION RATE: 19 BRPM | HEIGHT: 74 IN | SYSTOLIC BLOOD PRESSURE: 135 MMHG | TEMPERATURE: 97 F | DIASTOLIC BLOOD PRESSURE: 67 MMHG | BODY MASS INDEX: 33.37 KG/M2 | HEART RATE: 93 BPM

## 2019-11-13 LAB — POCT GLUCOSE: 167 MG/DL (ref 70–110)

## 2019-11-13 PROCEDURE — 63600175 PHARM REV CODE 636 W HCPCS: Performed by: STUDENT IN AN ORGANIZED HEALTH CARE EDUCATION/TRAINING PROGRAM

## 2019-11-13 PROCEDURE — 25000003 PHARM REV CODE 250: Performed by: STUDENT IN AN ORGANIZED HEALTH CARE EDUCATION/TRAINING PROGRAM

## 2019-11-13 RX ORDER — OXYCODONE AND ACETAMINOPHEN 5; 325 MG/1; MG/1
1 TABLET ORAL EVERY 4 HOURS PRN
Qty: 13 TABLET | Refills: 0 | Status: SHIPPED | OUTPATIENT
Start: 2019-11-13 | End: 2019-12-23

## 2019-11-13 RX ORDER — DICLOXACILLIN SODIUM 250 MG/1
250 CAPSULE ORAL 4 TIMES DAILY
Qty: 28 CAPSULE | Refills: 0 | Status: SHIPPED | OUTPATIENT
Start: 2019-11-13 | End: 2019-11-20

## 2019-11-13 RX ORDER — POLYETHYLENE GLYCOL 3350 17 G/17G
17 POWDER, FOR SOLUTION ORAL DAILY
Qty: 510 G | Refills: 0 | Status: SHIPPED | OUTPATIENT
Start: 2019-11-13 | End: 2019-12-23

## 2019-11-13 RX ADMIN — OXYCODONE HYDROCHLORIDE 10 MG: 10 TABLET ORAL at 10:11

## 2019-11-13 RX ADMIN — INSULIN ASPART 2 UNITS: 100 INJECTION, SOLUTION INTRAVENOUS; SUBCUTANEOUS at 08:11

## 2019-11-13 RX ADMIN — ACETAMINOPHEN 650 MG: 325 TABLET ORAL at 12:11

## 2019-11-13 RX ADMIN — PANTOPRAZOLE SODIUM 40 MG: 40 TABLET, DELAYED RELEASE ORAL at 08:11

## 2019-11-13 RX ADMIN — VANCOMYCIN HYDROCHLORIDE 1500 MG: 1.5 INJECTION, POWDER, LYOPHILIZED, FOR SOLUTION INTRAVENOUS at 08:11

## 2019-11-13 RX ADMIN — OXYCODONE HYDROCHLORIDE 10 MG: 10 TABLET ORAL at 12:11

## 2019-11-13 RX ADMIN — OXYCODONE HYDROCHLORIDE 5 MG: 5 TABLET ORAL at 02:11

## 2019-11-13 RX ADMIN — ATORVASTATIN CALCIUM 10 MG: 10 TABLET, FILM COATED ORAL at 08:11

## 2019-11-13 RX ADMIN — OXYCODONE HYDROCHLORIDE 10 MG: 10 TABLET ORAL at 04:11

## 2019-11-13 RX ADMIN — GENTAMICIN SULFATE 240 MG: 40 INJECTION, SOLUTION INTRAMUSCULAR; INTRAVENOUS at 04:11

## 2019-11-13 RX ADMIN — ACETAMINOPHEN 650 MG: 325 TABLET ORAL at 04:11

## 2019-11-13 NOTE — DISCHARGE SUMMARY
[facility]  Urology  Discharge Summary      Patient Name: Rickey Rios  MRN: [id number]  Admission Date: 11/12/2019  Hospital Length of Stay: 0 days  Discharge Date and Time: 11/13/2019 11:04 AM  Attending Physician: Denis Goodwin MD  Discharging Provider: Timur Short MD  Primary Care Physician: Min Kaur MD    HPI: 58yo M with ED after RALP who is s/p IPP placement 11/12/19    Procedure(s) (LRB):  INSERTION, PENILE PROSTHESIS, INFLATABLE (N/A)     Indwelling Lines/Drains at time of discharge:   Lines/Drains/Airways       none               Hospital Course (synopsis of major diagnoses, care, treatment, and services provided during the course of the hospital stay): The pt tolerated the procedure well. He was transferred to recovery post-op and then to the floor. Once on the floor his diet was advanced and he ambulated the night of surgery. His IV antibiotics were continued. On POD 1 the pt was tolerating a regular diet, ambulating without difficulty. His pain was well controlled. His garrett was draining clear yellow urine. The dressing was removed. His incisions were c/d/i. He had minimal swelling.     On POD1 his garrett catheter was removed and once he voided, was ready for discharge to home after infusion of IV antibiotics.       Consults:     Significant Diagnostic Studies: none    Pending Diagnostic Studies:     None          Final Active Diagnoses:    Diagnosis Date Noted POA    PRINCIPAL PROBLEM:  Erectile dysfunction after radical prostatectomy [N52.31] 11/12/2019 Yes    Peyronie's disease [N48.6] 12/05/2018 Yes    Prostate cancer [C61] 08/24/2017 Yes    Essential hypertension [I10] 06/14/2017 Yes    Lumbar disc disease [M51.9] 06/14/2017 Yes    Type 2 diabetes mellitus without complication, with long-term current use of insulin [E11.9, Z79.4] 06/14/2017 Yes      Problems Resolved During this Admission:       Discharged Condition: good    Disposition: Home or Self  "Care    Follow Up:  Follow-up Information     Denis Goodwin MD In 2 weeks.    Specialty:  Urology  Contact information:  [address]  [facility] [address]  [phone]                 Patient Instructions:      Call MD for:  persistent nausea and vomiting or diarrhea     Call MD for:  severe uncontrolled pain     Call MD for:  persistent dizziness, light-headedness, or visual disturbances     Call MD for:   Order Comments: Temperature > 101F     Medications:  Reconciled Home Medications:      Medication List      START taking these medications    dicloxacillin 250 MG capsule  Commonly known as:  DYNAPEN  Take 1 capsule (250 mg total) by mouth 4 (four) times daily. for 7 days     oxyCODONE-acetaminophen 5-325 mg per tablet  Commonly known as:  PERCOCET  Take 1 tablet by mouth every 4 (four) hours as needed for Pain.        CHANGE how you take these medications    * polyethylene glycol 17 gram/dose powder  Commonly known as:  GLYCOLAX  What changed:  Another medication with the same name was added. Make sure you understand how and when to take each.     * polyethylene glycol 17 gram/dose powder  Commonly known as:  GLYCOLAX  Mix 1 capful (17 g) into liquid and take by mouth once daily.  What changed:  You were already taking a medication with the same name, and this prescription was added. Make sure you understand how and when to take each.         * This list has 2 medication(s) that are the same as other medications prescribed for you. Read the directions carefully, and ask your doctor or other care provider to review them with you.            CONTINUE taking these medications    aspirin 81 MG EC tablet  Commonly known as:  ECOTRIN  Take 81 mg by mouth every morning.     atorvastatin 10 MG tablet  Commonly known as:  LIPITOR  every morning.     BD ULTRA-FINE BERNARDO PEN NEEDLE 32 gauge x 5/32" Ndle  Generic drug:  pen needle, diabetic     buPROPion 300 MG 24 hr tablet  Commonly known as:  WELLBUTRIN XL   "   FREESTYLE LANCETS 28 gauge Misc  Generic drug:  lancets     FREESTYLE LITE STRIPS Strp  Generic drug:  blood sugar diagnostic     glimepiride 4 MG tablet  Commonly known as:  AMARYL     indomethacin 75 mg Cpsr CR capsule  Commonly known as:  INDOCIN SR     insulin syringe-needle U-100 1 mL 31 gauge x 5/16 Syrg  Use as directed with ICI Therapy     INVOKANA 300 mg Tab tablet  Generic drug:  canagliflozin     LORazepam 0.5 MG tablet  Commonly known as:  ATIVAN  Take 0.5 mg by mouth every 6 (six) hours as needed for Anxiety.     NovoLOG Flexpen U-100 Insulin 100 unit/mL (3 mL) Inpn pen  Generic drug:  insulin aspart U-100     papaverine 30 mg/mL injection  Add: Phentolamine 10 mg  Add: PGE1 100 mcg    Sig:  Inject 10 units (0.10 mls) as directed     pentoxifylline 400 mg Tbsr  Commonly known as:  TRENTAL  Take 1 tablet (400 mg total) by mouth 3 (three) times daily with meals.     sildenafil 20 mg Tab  Commonly known as:  REVATIO  TAKE 1-3 TABLETS 30 MINUTES PRIOR TO INTERCOURSE. MAX OF 5.     STRATTERA 80 MG capsule  Generic drug:  atomoxetine     tadalafil 5 MG tablet  Commonly known as:  CIALIS  Take 1 PO QOD for prostate cancer ED     TOUJEO SOLOSTAR U-300 INSULIN 300 unit/mL (1.5 mL) Inpn pen  Generic drug:  insulin glargine (TOUJEO)  Inject into the skin.     valsartan 160 MG tablet  Commonly known as:  DIOVAN            Time spent on the discharge of patient: 15 minutes    Timur Short MD  Urology  [facility]

## 2019-11-13 NOTE — ANESTHESIA POSTPROCEDURE EVALUATION
Anesthesia Post Evaluation    Patient: Rickey Rios    Procedure(s) Performed: Procedure(s) (LRB):  INSERTION, PENILE PROSTHESIS, INFLATABLE (N/A)    Final Anesthesia Type: general  Patient location during evaluation: PACU  Patient participation: Yes- Able to Participate  Level of consciousness: awake and alert  Post-procedure vital signs: reviewed and stable  Pain management: adequate  Airway patency: patent  PONV status at discharge: No PONV  Anesthetic complications: no      Cardiovascular status: blood pressure returned to baseline and hemodynamically stable  Respiratory status: unassisted, spontaneous ventilation and room air  Hydration status: euvolemic  Follow-up not needed.          Vitals Value Taken Time   /71 11/13/2019  4:17 AM   Temp 36.7 °C (98 °F) 11/13/2019  4:17 AM   Pulse 100 11/13/2019  4:17 AM   Resp 18 11/13/2019  4:17 AM   SpO2 96 % 11/13/2019  4:17 AM         Event Time     Out of Recovery 14:43:00          Pain/Alyson Score: Pain Rating Prior to Med Admin: 8 (11/13/2019  4:06 AM)  Pain Rating Post Med Admin: 5 (11/12/2019  2:45 PM)  Alyson Score: 10 (11/12/2019  5:00 PM)

## 2019-11-13 NOTE — ASSESSMENT & PLAN NOTE
- po tylenol, PO oxycodone for pain control  - diabetic diet   - SSI  - HLIV  - Ambulate pm of surgery and qid  - Ice pack to scrotum  - IV antibiotics POD 1 prior to discharge  - Drains: Mcdaniels to be removed POD 1, voiding trial  - Prophylaxis: IS, SCDs  - Home later this morning

## 2019-11-13 NOTE — NURSING
@ 10:30 Dr castelan on call for urology notified that PO pain medication wasn't controlling the pts pain.  He states will look at the pts chart and put something in.

## 2019-11-13 NOTE — SUBJECTIVE & OBJECTIVE
Interval History:   Some pain shortly after surgery, improved with oxycodone and toradol  Walked through hallways  Tolerating his diet  No other issues    Review of Systems  Objective:     Temp:  [97.2 °F (36.2 °C)-98 °F (36.7 °C)] 98 °F (36.7 °C)  Pulse:  [] 100  Resp:  [11-21] 18  SpO2:  [94 %-100 %] 96 %  BP: (114-159)/(55-83) 159/71     Body mass index is 33.38 kg/m².           Drains     Drain                 Urethral Catheter 11/12/19 1126 Latex;Straight-tip 16 Fr. less than 1 day                Physical Exam   Constitutional: No distress.   HENT:   Head: Normocephalic and atraumatic.   Eyes: Conjunctivae are normal. No scleral icterus.   Neck: Normal range of motion.   Cardiovascular: Normal rate.    Pulmonary/Chest: Effort normal. No respiratory distress.   Abdominal: Soft. He exhibits no distension. There is no tenderness. There is no rebound and no guarding.   Genitourinary:   Genitourinary Comments: Mummy wrap in place  Ice to scrotum  No perineal ecchymosis  Mcdaniels draining clear yellow   Musculoskeletal: Normal range of motion.   SCDs in place   Neurological: He is alert.   Skin: Skin is warm and dry. He is not diaphoretic.     Psychiatric: He has a normal mood and affect.       Significant Labs:    BMP:  No results for input(s): NA, K, CL, CO2, BUN, CREATININE, LABGLOM, GLUCOSE, CALCIUM in the last 168 hours.    CBC:   No results for input(s): WBC, HGB, HCT, PLT in the last 168 hours.    All pertinent labs results from the past 24 hours have been reviewed.    Significant Imaging:  All pertinent imaging results/findings from the past 24 hours have been reviewed.

## 2019-11-13 NOTE — PLAN OF CARE
Pt sitting up in bed eating supper.  Tolerating well with no nausea.  Iv to right hand with ns @ 125cc/hr.  Iv to left wrist saline locked and secured.  Surgical underwear on with dressing/coban in place to penis.  Ice to scrotum.  Mcdaniels to gravity and secured to lower abdomen with tape. Clear yellow urine noted in bag.  Scds in place.  Call light within reach.  Bed locked and lowered for safety.  Wife at bedside.

## 2019-11-13 NOTE — PROGRESS NOTES
[facility]  Urology  Progress Note    Patient Name: Rickey Rios  MRN: [id number]  Admission Date: 11/12/2019  Hospital Length of Stay: 0 days  Code Status: Prior   Attending Provider: Denis Goodwin MD   Primary Care Physician: Min Kaur MD    Subjective:     HPI:  60yo M with ED after RALP who is s/p IPP placement 11/12/19    Interval History:   Some pain shortly after surgery, improved with oxycodone and toradol  Walked through hallways  Tolerating his diet  No other issues    Review of Systems  Objective:     Temp:  [97.2 °F (36.2 °C)-98 °F (36.7 °C)] 98 °F (36.7 °C)  Pulse:  [] 100  Resp:  [11-21] 18  SpO2:  [94 %-100 %] 96 %  BP: (114-159)/(55-83) 159/71     Body mass index is 33.38 kg/m².           Drains     Drain                 Urethral Catheter 11/12/19 1126 Latex;Straight-tip 16 Fr. less than 1 day                Physical Exam   Constitutional: No distress.   HENT:   Head: Normocephalic and atraumatic.   Eyes: Conjunctivae are normal. No scleral icterus.   Neck: Normal range of motion.   Cardiovascular: Normal rate.    Pulmonary/Chest: Effort normal. No respiratory distress.   Abdominal: Soft. He exhibits no distension. There is no tenderness. There is no rebound and no guarding.   Genitourinary:   Genitourinary Comments: Mummy wrap in place  Ice to scrotum  No perineal ecchymosis  Mcdaniels draining clear yellow   Musculoskeletal: Normal range of motion.   SCDs in place   Neurological: He is alert.   Skin: Skin is warm and dry. He is not diaphoretic.     Psychiatric: He has a normal mood and affect.       Significant Labs:    BMP:  No results for input(s): NA, K, CL, CO2, BUN, CREATININE, LABGLOM, GLUCOSE, CALCIUM in the last 168 hours.    CBC:   No results for input(s): WBC, HGB, HCT, PLT in the last 168 hours.    All pertinent labs results from the past 24 hours have been reviewed.    Significant Imaging:  All pertinent imaging results/findings from the past 24 hours  have been reviewed.                  Assessment/Plan:     * Erectile dysfunction after radical prostatectomy  - po tylenol, PO oxycodone for pain control  - diabetic diet   - SSI  - HLIV  - Ambulate pm of surgery and qid  - Ice pack to scrotum  - IV antibiotics POD 1 prior to discharge  - Drains: Mcdaniels to be removed POD 1, voiding trial  - Prophylaxis: IS, SCDs  - Home later this morning          VTE Risk Mitigation (From admission, onward)         Ordered     Place sequential compression device  Until discontinued      11/12/19 1332     Place sequential compression device  Until discontinued      11/12/19 0082                Timur Short MD  Urology  [facility]

## 2019-11-13 NOTE — PROGRESS NOTES
Pt ready for discharge. Discharge instructions given and explained to pt. Pt verbalizes understanding. Pt waiting for [facility] pharmacy to deliver filled prescriptions. PIV's removed. Pt voided 150 mls per urinal. No further questions from pt at this time. Pt to be discharged via wheelchair once he receives prescriptions. Will cont to monitor until discharge.

## 2019-11-13 NOTE — NURSING
Pt called out stating he was hurting and needed something more for pain.  2 hrs until Q4hr oxy 10 is due.  Notified Dr castelan with urology on call and he ok'd to administer the oxy 5 mg 2 hrs early.

## 2019-11-18 ENCOUNTER — TELEPHONE (OUTPATIENT)
Dept: UROLOGY | Facility: CLINIC | Age: 59
End: 2019-11-18

## 2019-11-18 NOTE — TELEPHONE ENCOUNTER
----- Message from Anali Ferguson sent at 11/18/2019  9:17 AM CST -----  Contact: Shalini Rios (Spouse)[phone]  Rx Refill/Request     Is this a Refill or New Rx:  refill  Rx Name and Strength:  oxyCODONE-acetaminophen (PERCOCET) 5-325 mg per tablet  Preferred Pharmacy with phone number:   [id number] - [place], [place] - [address] [address]  [address] [facility] [address]  Phone: [phone] Fax: [phone]    Communication Preference:call   Additional Information:

## 2019-11-18 NOTE — TELEPHONE ENCOUNTER
Returned pts call. Spoke to wife. Instructed wife pt should be weaning off of pain meds, advised to try tyelnol as needed for pain. She verbalized understanding.

## 2019-11-27 ENCOUNTER — OFFICE VISIT (OUTPATIENT)
Dept: UROLOGY | Facility: CLINIC | Age: 59
End: 2019-11-27
Payer: COMMERCIAL

## 2019-11-27 ENCOUNTER — TELEPHONE (OUTPATIENT)
Dept: UROLOGY | Facility: CLINIC | Age: 59
End: 2019-11-27

## 2019-11-27 VITALS
BODY MASS INDEX: 34.23 KG/M2 | HEART RATE: 93 BPM | SYSTOLIC BLOOD PRESSURE: 143 MMHG | HEIGHT: 74 IN | WEIGHT: 266.75 LBS | DIASTOLIC BLOOD PRESSURE: 77 MMHG

## 2019-11-27 DIAGNOSIS — N52.31 ERECTILE DYSFUNCTION FOLLOWING RADICAL PROSTATECTOMY: Primary | ICD-10-CM

## 2019-11-27 PROCEDURE — 99024 POSTOP FOLLOW-UP VISIT: CPT | Mod: S$GLB,,, | Performed by: UROLOGY

## 2019-11-27 PROCEDURE — 99999 PR PBB SHADOW E&M-EST. PATIENT-LVL III: ICD-10-PCS | Mod: PBBFAC,,, | Performed by: UROLOGY

## 2019-11-27 PROCEDURE — 99999 PR PBB SHADOW E&M-EST. PATIENT-LVL III: CPT | Mod: PBBFAC,,, | Performed by: UROLOGY

## 2019-11-27 PROCEDURE — 99024 PR POST-OP FOLLOW-UP VISIT: ICD-10-PCS | Mod: S$GLB,,, | Performed by: UROLOGY

## 2019-11-27 RX ORDER — INSULIN DEGLUDEC 200 U/ML
INJECTION, SOLUTION SUBCUTANEOUS
Refills: 3 | COMMUNITY
Start: 2019-11-05

## 2019-11-27 NOTE — PROGRESS NOTES
Mr. Rios is a 59 y.o. male presenting with ED.    PRESENTING ILLNESS:    Rickey Rios is a 59 y.o. male s/p RALP on 9/8/2017 by Dr. Burns.  Since then, he c/o ED.  Prior to surgery, he did have ED mild ED.  He's s/p placement of a 3 piece AMS IPP on 11/12/19.    He has a history of hypogonadism.  Is TRT due to fatigue.  He's on testopel currently.  He understands that this is controversial.    He does not have CJ.  He is wearing 0 pads/day.  No hematuria.  No dysuria.  Good FOS.    No bone pain or weight loss.    REVIEW OF SYSTEMS:    Rickey Rios denies any history of headache, blurred vision, fever, nausea, vomiting, chills, flank discomfort, abdominal pain, bleeding per rectum, cough, SOB, recent loss of consciousness, recent mental status changes, seizures, dizziness, or upper or lower extremity weakness.    COCO  1. 1  2. 0  3. 0  4. 0  5. 0     PATIENT HISTORY:    Past Medical History:   Diagnosis Date    ADD (attention deficit disorder)     Anxiety     Arthritis     Diabetes mellitus, type 2     Dyslexia     H/O elbow surgery 10/2017    Hypertension     preventative     BLAIRE on CPAP     Prostate cancer 8/24/2017       Family History   Problem Relation Age of Onset    Pulmonary fibrosis Mother     Diabetes Father     Atrial fibrillation Brother        Past Surgical History:   Procedure Laterality Date    BACK SURGERY      HEEL SPUR SURGERY      INSERTION OF INFLATABLE PENILE PROSTHESIS N/A 11/12/2019    Procedure: INSERTION, PENILE PROSTHESIS, INFLATABLE;  Surgeon: Denis Goodwin MD;  Location: [facility] OR [address] [place] [address];  Service: Urology;  Laterality: N/A;  1.5hr  Tariq Chamorro AMS    NECK SURGERY      PROSTATE SURGERY         Social History     Socioeconomic History    Marital status:      Spouse name: Not on file    Number of children: 4    Years of education: Not on file    Highest education level: Not on file   Occupational History    Occupation: federal  "employee   Social Needs    Financial resource strain: Not on file    Food insecurity:     Worry: Not on file     Inability: Not on file    Transportation needs:     Medical: Not on file     Non-medical: Not on file   Tobacco Use    Smoking status: Never Smoker    Smokeless tobacco: Never Used   Substance and Sexual Activity    Alcohol use: No    Drug use: No    Sexual activity: Yes     Partners: Female   Lifestyle    Physical activity:     Days per week: Not on file     Minutes per session: Not on file    Stress: Not on file   Relationships    Social connections:     Talks on phone: Not on file     Gets together: Not on file     Attends Jainism service: Not on file     Active member of club or organization: Not on file     Attends meetings of clubs or organizations: Not on file     Relationship status: Not on file   Other Topics Concern    Not on file   Social History Narrative    Not on file       Review of patient's allergies indicates:   Allergen Reactions    Phenergan [promethazine] Anaphylaxis and Edema     Throat/mouth swelling    Zofran [ondansetron hcl (pf)] Anaphylaxis, Hives and Edema     Throat swelling & mouth    Metformin Nausea And Vomiting         Current Outpatient Medications:     aspirin (ECOTRIN) 81 MG EC tablet, Take 81 mg by mouth every morning., Disp: , Rfl:     atorvastatin (LIPITOR) 10 MG tablet, every morning. , Disp: , Rfl:     BD ULTRA-FINE BERNARDO PEN NEEDLES 32 gauge x 5/32" Ndle, , Disp: , Rfl:     buPROPion (WELLBUTRIN XL) 300 MG 24 hr tablet, , Disp: , Rfl:     FREESTYLE LANCETS 28 gauge lancets, , Disp: , Rfl:     FREESTYLE LITE STRIPS Strp, , Disp: , Rfl:     glimepiride (AMARYL) 4 MG tablet, , Disp: , Rfl:     indomethacin (INDOCIN SR) 75 mg CpSR CR capsule, , Disp: , Rfl:     insulin glargine, TOUJEO, (TOUJEO SOLOSTAR) 300 unit/mL (1.5 mL) InPn pen, Inject into the skin., Disp: , Rfl:     insulin syringe-needle U-100 1 mL 31 gauge x 5/16 Syrg, Use as " directed with ICI Therapy, Disp: 10 each, Rfl: 12    INVOKANA 300 mg Tab tablet, , Disp: , Rfl:     lorazepam (ATIVAN) 0.5 MG tablet, Take 0.5 mg by mouth every 6 (six) hours as needed for Anxiety., Disp: , Rfl:     NOVOLOG FLEXPEN U-100 INSULIN 100 unit/mL (3 mL) InPn pen, , Disp: , Rfl:     oxyCODONE-acetaminophen (PERCOCET) 5-325 mg per tablet, Take 1 tablet by mouth every 4 (four) hours as needed for Pain., Disp: 13 tablet, Rfl: 0    papaverine 30 mg/mL injection, Add: Phentolamine 10 mg Add: PGE1 100 mcg  Sig:  Inject 10 units (0.10 mls) as directed, Disp: 10 mL, Rfl: 11    pentoxifylline (TRENTAL) 400 mg TbSR, Take 1 tablet (400 mg total) by mouth 3 (three) times daily with meals., Disp: 90 tablet, Rfl: 5    polyethylene glycol (GLYCOLAX) 17 gram/dose powder, Mix 1 capful (17 g) into liquid and take by mouth once daily., Disp: 510 g, Rfl: 0    polyethylene glycol (GLYCOLAX) 17 gram/dose powder, , Disp: , Rfl:     sildenafil (REVATIO) 20 mg Tab, TAKE 1-3 TABLETS 30 MINUTES PRIOR TO INTERCOURSE. MAX OF 5., Disp: 30 tablet, Rfl: 11    STRATTERA 80 mg capsule, , Disp: , Rfl:     tadalafil (CIALIS) 5 MG tablet, Take 1 PO QOD for prostate cancer ED, Disp: 30 tablet, Rfl: 11    valsartan (DIOVAN) 160 MG tablet, , Disp: , Rfl:     Current Facility-Administered Medications:     [START ON 3/17/2020] testosterone Pllt 12 Pellet, 12 Pellet, Implant, 1 time in Clinic/HOD, Denis Goodwin MD      PHYSICAL EXAMINATION:    The patient generally appears in good health, is appropriately interactive, and is in no apparent distress.     Eyes: anicteric sclerae, moist conjunctivae; no lid-lag; PERRLA     HENT: Atraumatic; oropharynx clear with moist mucous membranes and no mucosal ulcerations;normal hard and soft palate. No evidence of lymphadenopathy.    Neck: Trachea midline.  No thyromegaly.    Musculoskeletal: No abnormal gait.    Skin: No lesions.    Mental: Cooperative with normal affect.  Is oriented to time,  place, and person.    Neuro: Grossly intact.    Chest: Normal inspiratory effort.   No accessory muscles.  No audible wheezes.  Respirations symmetric on inspiration and expiration.    Heart: Regular rhythm.      Abdomen:  Soft, non-tender. No masses or organomegaly. Bladder is not palpable. No evidence of flank discomfort. No evidence of inguinal hernia.    Genitourinary: The penis is circumcised with a plaque c/w peyronie's on the shaft. The urethral meatus is normal. The testes, epididymides, and cord structures are normal in size and contour bilaterally. The scrotum is normal in size and contour.  The IPP components are palpable in the penis and scrotum.    Extremities: No clubbing, cyanosis, or edema        LABS:    UA dipped negative today  Lab Results   Component Value Date    PSADIAG <0.01 05/22/2019    PSADIAG <0.01 11/07/2018    PSADIAG <0.01 04/30/2018        IMPRESSION:    Encounter Diagnosis   Name Primary?    Erectile dysfunction following radical prostatectomy Yes     DM, controlled  HTN, controlled    PLAN:    1. Discussed that TRT after RALP is controversial.  He's willing to accept the risk. Continue testopel.  Do 4/2020.  2. RTC 4 weeks to inflate,deflate with an ANAMIKA.  3. RTC 3 months.      Copy to:

## 2019-11-27 NOTE — LETTER
November 27, 2019      Min Kaur MD  [id number] [address] [address]           [facility] - Urology 4th Floor  [address]  [place] [address]  Phone: [phone]          Patient: Rickey Rios   MR Number: [id number]   YOB: 1960   Date of Visit: 11/27/2019       Dear Dr. Min Kaur:    Thank you for referring Rickey Rios to me for evaluation. Attached you will find relevant portions of my assessment and plan of care.    If you have questions, please do not hesitate to call me. I look forward to following Rickey Rios along with you.    Sincerely,    Denis Goodwin MD    Enclosure  CC:  No Recipients    If you would like to receive this communication electronically, please contact [email or web address] or [phone] to request more information on [employer] access.    For providers and/or their staff who would like to refer a patient to [facility], please contact us through our one-stop-shop provider referral line, [facility], at [phone].    If you feel you have received this communication in error or would no longer like to receive these types of communications, please e-mail [email or web address]

## 2019-12-23 ENCOUNTER — OFFICE VISIT (OUTPATIENT)
Dept: UROLOGY | Facility: CLINIC | Age: 59
End: 2019-12-23
Payer: COMMERCIAL

## 2019-12-23 VITALS
HEIGHT: 74 IN | HEART RATE: 98 BPM | BODY MASS INDEX: 33.79 KG/M2 | DIASTOLIC BLOOD PRESSURE: 79 MMHG | WEIGHT: 263.25 LBS | SYSTOLIC BLOOD PRESSURE: 139 MMHG

## 2019-12-23 DIAGNOSIS — N52.31 ERECTILE DYSFUNCTION FOLLOWING RADICAL PROSTATECTOMY: ICD-10-CM

## 2019-12-23 DIAGNOSIS — Z90.79 HISTORY OF ROBOT-ASSISTED LAPAROSCOPIC RADICAL PROSTATECTOMY: ICD-10-CM

## 2019-12-23 DIAGNOSIS — N48.6 PEYRONIE DISEASE: ICD-10-CM

## 2019-12-23 DIAGNOSIS — Z98.890 POST-OPERATIVE STATE: Primary | ICD-10-CM

## 2019-12-23 DIAGNOSIS — Z96.0 S/P INSERTION OF PENILE IMPLANT: ICD-10-CM

## 2019-12-23 DIAGNOSIS — C61 PROSTATE CANCER: ICD-10-CM

## 2019-12-23 PROCEDURE — 99999 PR PBB SHADOW E&M-EST. PATIENT-LVL IV: CPT | Mod: PBBFAC,,, | Performed by: NURSE PRACTITIONER

## 2019-12-23 PROCEDURE — 99024 PR POST-OP FOLLOW-UP VISIT: ICD-10-PCS | Mod: S$GLB,,, | Performed by: NURSE PRACTITIONER

## 2019-12-23 PROCEDURE — 99024 POSTOP FOLLOW-UP VISIT: CPT | Mod: S$GLB,,, | Performed by: NURSE PRACTITIONER

## 2019-12-23 PROCEDURE — 99999 PR PBB SHADOW E&M-EST. PATIENT-LVL IV: ICD-10-PCS | Mod: PBBFAC,,, | Performed by: NURSE PRACTITIONER

## 2019-12-23 NOTE — PROGRESS NOTES
Subjective:       Patient ID: Rickey Rios is a 59 y.o. male.    Chief Complaint: Post-op Evaluation    Rickey Rios is a 59 y.o. Male s/p RALP on 09/08/2017 by Dr. Burns.    Sanderson 6 (3+3); (-) extension, margins, & SV; pT2, pN0, pMx  No bone pain or weight loss.     He was also told that he had peyronie's.  He noticed a curve to his penis > one year ago.  No difficulty with penetration due to the curve.  He doesn't know the degree of curve as he hasn't had a good erection.  Last clinic visit was with Dr. Goodwin on 5/21/2018.  Since RALP, he c/o ED.      Prior to surgery, he did have ED mild ED and was on TRT due to fatigue.  He is on tesopel.    11/27/2019 he was last seen in clinic with Dr. Goodwin.  He's s/p placement of a 3 piece AMS IPP on 11/12/19.    He is here today for initial cycling.   He reports everything healed nicely.  No scrotal pain.                            PSA                  <0.01               05/22/2019                 PSA                  <0.01               11/07/2018                 PSA                  <0.01               04/30/2018                 PSA                  <0.01               02/05/2018                 PSA                  0.02                10/05/2017                 PSA                  5.7 (H)             07/12/2017                 PSA                  4.6 (H)             05/18/2017                    Past Medical History:  No date: ADD (attention deficit disorder)  No date: Anxiety  No date: Arthritis  No date: Diabetes mellitus, type 2  No date: Dyslexia  10/2017: H/O elbow surgery  No date: Hypertension      Comment:  preventative   No date: BLAIRE on CPAP  8/24/2017: Prostate cancer    Past Surgical History:  No date: BACK SURGERY  No date: HEEL SPUR SURGERY  11/12/2019: INSERTION OF INFLATABLE PENILE PROSTHESIS; N/A      Comment:  Procedure: INSERTION, PENILE PROSTHESIS, INFLATABLE;                 Surgeon: Denis Goodwin MD;  Location: [facility] OR  2ND FLR;               Service: Urology;  Laterality: N/A;  1.5hrSwerner Chamorro AMS  No date: NECK SURGERY  No date: PROSTATE SURGERY    Review of patient's family history indicates:  Problem: Pulmonary fibrosis      Relation: Mother          Age of Onset: (Not Specified)  Problem: Diabetes      Relation: Father          Age of Onset: (Not Specified)  Problem: Atrial fibrillation      Relation: Brother          Age of Onset: (Not Specified)      Social History    Socioeconomic History      Marital status:       Spouse name: Not on file      Number of children: 4      Years of education: Not on file      Highest education level: Not on file    Occupational History      Occupation: federal employee    Social Needs      Financial resource strain: Not on file      Food insecurity:        Worry: Not on file        Inability: Not on file      Transportation needs:        Medical: Not on file        Non-medical: Not on file    Tobacco Use      Smoking status: Never Smoker      Smokeless tobacco: Never Used    Substance and Sexual Activity      Alcohol use: No      Drug use: No      Sexual activity: Yes        Partners: Female    Lifestyle      Physical activity:        Days per week: Not on file        Minutes per session: Not on file      Stress: Not on file    Relationships      Social connections:        Talks on phone: Not on file        Gets together: Not on file        Attends Taoism service: Not on file        Active member of club or organization: Not on file        Attends meetings of clubs or organizations: Not on file        Relationship status: Not on file    Other Topics      Concerns:        Not on file    Social History Narrative      Not on file      Allergies:  Phenergan (promethazine); Zofran (ondansetron hcl (pf)); and Metformin    Medications:  Current Outpatient Medications:   aspirin (ECOTRIN) 81 MG EC tablet, Take 81 mg by mouth every morning., Disp: , Rfl:   atorvastatin  "(LIPITOR) 10 MG tablet, every morning. , Disp: , Rfl:   BD ULTRA-FINE BERNARDO PEN NEEDLES 32 gauge x 5/32" Ndle, , Disp: , Rfl:   buPROPion (WELLBUTRIN XL) 300 MG 24 hr tablet, , Disp: , Rfl:   FREESTYLE LANCETS 28 gauge lancets, , Disp: , Rfl:   FREESTYLE LITE STRIPS Strp, , Disp: , Rfl:   glimepiride (AMARYL) 4 MG tablet, , Disp: , Rfl:   indomethacin (INDOCIN SR) 75 mg CpSR CR capsule, , Disp: , Rfl:   insulin glargine, TOUJEO, (TOUJEO SOLOSTAR) 300 unit/mL (1.5 mL) InPn pen, Inject into the skin., Disp: , Rfl:   insulin syringe-needle U-100 1 mL 31 gauge x 5/16 Syrg, Use as directed with ICI Therapy, Disp: 10 each, Rfl: 12  INVOKANA 300 mg Tab tablet, , Disp: , Rfl:   lorazepam (ATIVAN) 0.5 MG tablet, Take 0.5 mg by mouth every 6 (six) hours as needed for Anxiety., Disp: , Rfl:   NOVOLOG FLEXPEN U-100 INSULIN 100 unit/mL (3 mL) InPn pen, , Disp: , Rfl:   papaverine 30 mg/mL injection, Add: Phentolamine 10 mg Add: PGE1 100 mcg  Sig:  Inject 10 units (0.10 mls) as directed, Disp: 10 mL, Rfl: 11  sildenafil (REVATIO) 20 mg Tab, TAKE 1-3 TABLETS 30 MINUTES PRIOR TO INTERCOURSE. MAX OF 5., Disp: 30 tablet, Rfl: 11  STRATTERA 80 mg capsule, , Disp: , Rfl:   tadalafil (CIALIS) 5 MG tablet, Take 1 PO QOD for prostate cancer ED, Disp: 30 tablet, Rfl: 11  TRESIBA FLEXTOUCH U-200 200 unit/mL (3 mL) InPn, INJECT 50 UNITS SUBCUTANEOUSLY ONCE DAILY, Disp: , Rfl: 3  valsartan (DIOVAN) 160 MG tablet, , Disp: , Rfl:   pentoxifylline (TRENTAL) 400 mg TbSR, Take 1 tablet (400 mg total) by mouth 3 (three) times daily with meals., Disp: 90 tablet, Rfl: 5  Current Facility-Administered Medications:   (START ON 3/17/2020) testosterone Pllt 12 Pellet, 12 Pellet, Implant, 1 time in Clinic/HOD, Denis Goodwin MD        Review of Systems   Constitutional: Negative for chills and fever.   Genitourinary: Negative for discharge, dysuria, penile pain, penile swelling, scrotal swelling, testicular pain and urgency.        " Pleased with how he urinates.  Scrotal incision well healed         Objective:      Physical Exam   Nursing note and vitals reviewed.  Constitutional: He is oriented to person, place, and time. Vital signs are normal. He appears well-developed and well-nourished. He is active and cooperative.  Non-toxic appearance. He does not have a sickly appearance.   HENT:   Head: Normocephalic and atraumatic.   Right Ear: External ear normal.   Left Ear: External ear normal.   Nose: Nose normal.   Mouth/Throat: Mucous membranes are normal.   Eyes: Conjunctivae and lids are normal. No scleral icterus.   Neck: Trachea normal, normal range of motion and full passive range of motion without pain. Neck supple. No JVD present. No tracheal deviation present.   Cardiovascular: Normal rate, S1 normal and S2 normal.    Pulmonary/Chest: Effort normal. No respiratory distress. He exhibits no tenderness.   Abdominal: Soft. Normal appearance. There is no hepatosplenomegaly. There is no tenderness. There is no CVA tenderness.   Genitourinary: Testes normal and penis normal.   Genitourinary Comments: Scrotal incision well healed.  IPP contents easily palpable and non tender.     Musculoskeletal: Normal range of motion.   Neurological: He is alert and oriented to person, place, and time. He has normal strength.   Skin: Skin is warm, dry and intact.     Psychiatric: He has a normal mood and affect. His behavior is normal. Judgment and thought content normal.       Assessment:       1. Post-operative state    2. Prostate cancer    3. History of robot-assisted laparoscopic radical prostatectomy    4. Erectile dysfunction following radical prostatectomy    5. Peyronie disease    6. S/P insertion of penile implant        Plan:         I spent 25 minutes with the patient of which more than half was spent in direct consultation with the patient in regards to our treatment and plan.    Education and recommendations of today's plan of care including  home remedies.  We discussed IPP and expectations  I was able to cycle his IPP.  He was then able to cycle multiple times; better than I did.  We discussed that he needs to cycle daily!  Any problems let me know  F/u with Dr. Goodwin

## 2019-12-23 NOTE — LETTER
December 23, 2019      Min Kaur MD  [address] [address] [address]           [facility] - Urology 4th Floor  [address]  [place] [address]  Phone: [phone]          Patient: Rickey Rios   MR Number: [id number]   YOB: 1960   Date of Visit: 12/23/2019       Dear Dr. Min Kaur:    Thank you for referring Rickey Rios to me for evaluation. Attached you will find relevant portions of my assessment and plan of care.    If you have questions, please do not hesitate to call me. I look forward to following Rickey Rios along with you.    Sincerely,    Ruth Ashrfa, NP    Enclosure  CC:  No Recipients    If you would like to receive this communication electronically, please contact [email or web address] or [phone] to request more information on [employer] Link access.    For providers and/or their staff who would like to refer a patient to [facility], please contact us through our one-stop-shop provider referral line, [facility], at [phone].    If you feel you have received this communication in error or would no longer like to receive these types of communications, please e-mail [email or web address]

## 2020-01-27 ENCOUNTER — TELEPHONE (OUTPATIENT)
Dept: UROLOGY | Facility: CLINIC | Age: 60
End: 2020-01-27

## 2020-01-27 NOTE — TELEPHONE ENCOUNTER
Spoke to pt. Pt stated insurance refused testopel pellets. I spoke to pt and informed him I did not receive any denial from prior auth department yet. Will check on status and let him know status. Pt verbalized understanding.

## 2020-01-27 NOTE — TELEPHONE ENCOUNTER
----- Message from Susan Bedolla sent at 1/27/2020 12:45 PM CST -----  Pt called stated insurance is not covering pellets. Do it need prior auth?  Pt is also confuse to when his next appt.  Is is 4/1/20 or do he need to reschedule the 2/19/20 and also keep 4/1/20?  Call back [phone]

## 2020-02-19 ENCOUNTER — OFFICE VISIT (OUTPATIENT)
Dept: UROLOGY | Facility: CLINIC | Age: 60
End: 2020-02-19
Payer: COMMERCIAL

## 2020-02-19 VITALS
SYSTOLIC BLOOD PRESSURE: 131 MMHG | DIASTOLIC BLOOD PRESSURE: 86 MMHG | HEART RATE: 95 BPM | BODY MASS INDEX: 33.95 KG/M2 | HEIGHT: 74 IN | WEIGHT: 264.56 LBS

## 2020-02-19 DIAGNOSIS — N52.31 ERECTILE DYSFUNCTION FOLLOWING RADICAL PROSTATECTOMY: Primary | ICD-10-CM

## 2020-02-19 DIAGNOSIS — C61 PROSTATE CANCER: ICD-10-CM

## 2020-02-19 DIAGNOSIS — N48.6 PEYRONIE'S DISEASE: ICD-10-CM

## 2020-02-19 DIAGNOSIS — Z79.4 TYPE 2 DIABETES MELLITUS WITHOUT COMPLICATION, WITH LONG-TERM CURRENT USE OF INSULIN: ICD-10-CM

## 2020-02-19 DIAGNOSIS — I10 ESSENTIAL HYPERTENSION: ICD-10-CM

## 2020-02-19 DIAGNOSIS — E29.1 MALE HYPOGONADISM: ICD-10-CM

## 2020-02-19 DIAGNOSIS — E11.9 TYPE 2 DIABETES MELLITUS WITHOUT COMPLICATION, WITH LONG-TERM CURRENT USE OF INSULIN: ICD-10-CM

## 2020-02-19 PROCEDURE — 99999 PR PBB SHADOW E&M-EST. PATIENT-LVL III: CPT | Mod: PBBFAC,,, | Performed by: UROLOGY

## 2020-02-19 PROCEDURE — 3075F SYST BP GE 130 - 139MM HG: CPT | Mod: CPTII,S$GLB,, | Performed by: UROLOGY

## 2020-02-19 PROCEDURE — 3079F PR MOST RECENT DIASTOLIC BLOOD PRESSURE 80-89 MM HG: ICD-10-PCS | Mod: CPTII,S$GLB,, | Performed by: UROLOGY

## 2020-02-19 PROCEDURE — 3051F HG A1C>EQUAL 7.0%<8.0%: CPT | Mod: CPTII,S$GLB,, | Performed by: UROLOGY

## 2020-02-19 PROCEDURE — 3008F PR BODY MASS INDEX (BMI) DOCUMENTED: ICD-10-PCS | Mod: CPTII,S$GLB,, | Performed by: UROLOGY

## 2020-02-19 PROCEDURE — 99214 PR OFFICE/OUTPT VISIT, EST, LEVL IV, 30-39 MIN: ICD-10-PCS | Mod: S$GLB,,, | Performed by: UROLOGY

## 2020-02-19 PROCEDURE — 3008F BODY MASS INDEX DOCD: CPT | Mod: CPTII,S$GLB,, | Performed by: UROLOGY

## 2020-02-19 PROCEDURE — 99214 OFFICE O/P EST MOD 30 MIN: CPT | Mod: S$GLB,,, | Performed by: UROLOGY

## 2020-02-19 PROCEDURE — 99999 PR PBB SHADOW E&M-EST. PATIENT-LVL III: ICD-10-PCS | Mod: PBBFAC,,, | Performed by: UROLOGY

## 2020-02-19 PROCEDURE — 3051F PR MOST RECENT HEMOGLOBIN A1C LEVEL 7.0 - < 8.0%: ICD-10-PCS | Mod: CPTII,S$GLB,, | Performed by: UROLOGY

## 2020-02-19 PROCEDURE — 3075F PR MOST RECENT SYSTOLIC BLOOD PRESS GE 130-139MM HG: ICD-10-PCS | Mod: CPTII,S$GLB,, | Performed by: UROLOGY

## 2020-02-19 PROCEDURE — 3079F DIAST BP 80-89 MM HG: CPT | Mod: CPTII,S$GLB,, | Performed by: UROLOGY

## 2020-02-19 NOTE — LETTER
February 19, 2020      Min Kaur MD  [id number] [address] [address]           [facility] - Urology 4th Floor  [address]  [place] [address]  Phone: [phone]          Patient: iRckey Rios   MR Number: [id number]   YOB: 1960   Date of Visit: 2/19/2020       Dear Dr. Min Kaur:    Thank you for referring Rickey Rios to me for evaluation. Attached you will find relevant portions of my assessment and plan of care.    If you have questions, please do not hesitate to call me. I look forward to following Rickey Rios along with you.    Sincerely,    Denis Goodwin MD    Enclosure  CC:  No Recipients    If you would like to receive this communication electronically, please contact [email or web address] or [phone] to request more information on [employer] Link access.    For providers and/or their staff who would like to refer a patient to [facility], please contact us through our one-stop-shop provider referral line, [facility], at [phone].    If you feel you have received this communication in error or would no longer like to receive these types of communications, please e-mail [email or web address]

## 2020-02-19 NOTE — PROGRESS NOTES
Mr. Rios is a 59 y.o. male presenting with ED.    PRESENTING ILLNESS:    Rickey Rios is a 59 y.o. male s/p RALP on 9/8/2017 by Dr. Burns.  Since then, he c/o ED.  Prior to surgery, he did have ED mild ED.  He's s/p placement of a 3 piece AMS IPP on 11/12/19.   He's very pleased with it.    He has peyronie's disease.  At time of IPP, his curve was < 20 degrees.    He has a history of hypogonadism.  Is TRT due to fatigue.  He's on testopel currently.  He understands that this is controversial.    He does not have CJ.  He is wearing 0 pads/day.  No hematuria.  No dysuria.  Good FOS.    No bone pain or weight loss.    REVIEW OF SYSTEMS:    Rickey Rios denies chest pain, sore throat, headache, blurred vision, fever, nausea, vomiting, chills, flank discomfort, abdominal pain, bleeding per rectum, cough, SOB, recent loss of consciousness, recent mental status changes, seizures, dizziness, or upper or lower extremity weakness.    COCO  1. 4  2. 5  3. 5  4. 5  5. 5     PATIENT HISTORY:    Past Medical History:   Diagnosis Date    ADD (attention deficit disorder)     Anxiety     Arthritis     Diabetes mellitus, type 2     Dyslexia     H/O elbow surgery 10/2017    Hypertension     preventative     BLAIRE on CPAP     Prostate cancer 8/24/2017       Family History   Problem Relation Age of Onset    Pulmonary fibrosis Mother     Diabetes Father     Atrial fibrillation Brother        Past Surgical History:   Procedure Laterality Date    BACK SURGERY      HEEL SPUR SURGERY      INSERTION OF INFLATABLE PENILE PROSTHESIS N/A 11/12/2019    Procedure: INSERTION, PENILE PROSTHESIS, INFLATABLE;  Surgeon: Denis Goodwin MD;  Location: [facility] OR [address] [place] [address];  Service: Urology;  Laterality: N/A;  1.5hr  Tariq Chamorro AMS    NECK SURGERY      PROSTATE SURGERY         Social History     Socioeconomic History    Marital status:      Spouse name: Not on file    Number of children: 4    Years of  "education: Not on file    Highest education level: Not on file   Occupational History    Occupation: federal employee   Social Needs    Financial resource strain: Not on file    Food insecurity:     Worry: Not on file     Inability: Not on file    Transportation needs:     Medical: Not on file     Non-medical: Not on file   Tobacco Use    Smoking status: Never Smoker    Smokeless tobacco: Never Used   Substance and Sexual Activity    Alcohol use: No    Drug use: No    Sexual activity: Yes     Partners: Female   Lifestyle    Physical activity:     Days per week: Not on file     Minutes per session: Not on file    Stress: Not on file   Relationships    Social connections:     Talks on phone: Not on file     Gets together: Not on file     Attends Religion service: Not on file     Active member of club or organization: Not on file     Attends meetings of clubs or organizations: Not on file     Relationship status: Not on file   Other Topics Concern    Not on file   Social History Narrative    Not on file       Review of patient's allergies indicates:   Allergen Reactions    Phenergan [promethazine] Anaphylaxis and Edema     Throat/mouth swelling    Zofran [ondansetron hcl (pf)] Anaphylaxis, Hives and Edema     Throat swelling & mouth    Metformin Nausea And Vomiting         Current Outpatient Medications:     aspirin (ECOTRIN) 81 MG EC tablet, Take 81 mg by mouth every morning., Disp: , Rfl:     atorvastatin (LIPITOR) 10 MG tablet, every morning. , Disp: , Rfl:     BD ULTRA-FINE BERNARDO PEN NEEDLES 32 gauge x 5/32" Ndle, , Disp: , Rfl:     buPROPion (WELLBUTRIN XL) 300 MG 24 hr tablet, , Disp: , Rfl:     FREESTYLE LANCETS 28 gauge lancets, , Disp: , Rfl:     FREESTYLE LITE STRIPS Strp, , Disp: , Rfl:     glimepiride (AMARYL) 4 MG tablet, , Disp: , Rfl:     indomethacin (INDOCIN SR) 75 mg CpSR CR capsule, , Disp: , Rfl:     insulin glargine, TOUJEO, (TOUJEO SOLOSTAR) 300 unit/mL (1.5 mL) InPn " pen, Inject into the skin., Disp: , Rfl:     insulin syringe-needle U-100 1 mL 31 gauge x 5/16 Syrg, Use as directed with ICI Therapy, Disp: 10 each, Rfl: 12    INVOKANA 300 mg Tab tablet, , Disp: , Rfl:     lorazepam (ATIVAN) 0.5 MG tablet, Take 0.5 mg by mouth every 6 (six) hours as needed for Anxiety., Disp: , Rfl:     NOVOLOG FLEXPEN U-100 INSULIN 100 unit/mL (3 mL) InPn pen, , Disp: , Rfl:     sildenafil (REVATIO) 20 mg Tab, TAKE 1-3 TABLETS 30 MINUTES PRIOR TO INTERCOURSE. MAX OF 5., Disp: 30 tablet, Rfl: 11    STRATTERA 80 mg capsule, , Disp: , Rfl:     TRESIBA FLEXTOUCH U-200 200 unit/mL (3 mL) InPn, INJECT 50 UNITS SUBCUTANEOUSLY ONCE DAILY, Disp: , Rfl: 3    valsartan (DIOVAN) 160 MG tablet, , Disp: , Rfl:     Current Facility-Administered Medications:     [START ON 3/17/2020] testosterone Pllt 12 Pellet, 12 Pellet, Implant, 1 time in Clinic/HOD, Denis Goodwin MD      PHYSICAL EXAMINATION:    The patient generally appears in good health, is appropriately interactive, and is in no apparent distress.     Eyes: anicteric sclerae, moist conjunctivae; no lid-lag; PERRLA     HENT: Atraumatic; oropharynx clear with moist mucous membranes and no mucosal ulcerations;normal hard and soft palate. No evidence of lymphadenopathy.    Neck: Trachea midline.  No thyromegaly.    Musculoskeletal: No abnormal gait.    Skin: No lesions.    Mental: Cooperative with normal affect.  Is oriented to time, place, and person.    Neuro: Grossly intact.    Chest: Normal inspiratory effort.   No accessory muscles.  No audible wheezes.  Respirations symmetric on inspiration and expiration.    Heart: Regular rhythm.      Abdomen:  Soft, non-tender. No masses or organomegaly. Bladder is not palpable. No evidence of flank discomfort. No evidence of inguinal hernia.    Genitourinary: The penis is circumcised with a plaque c/w peyronie's on the shaft. The urethral meatus is normal. The testes, epididymides, and cord structures  are normal in size and contour bilaterally. The scrotum is normal in size and contour.  The IPP components are palpable in the penis and scrotum.    Extremities: No clubbing, cyanosis, or edema        LABS:    UA dipped negative today  Lab Results   Component Value Date    PSADIAG <0.01 05/22/2019    PSADIAG <0.01 11/07/2018    PSADIAG <0.01 04/30/2018        IMPRESSION:    Encounter Diagnoses   Name Primary?    Erectile dysfunction following radical prostatectomy Yes    Peyronie's disease     Essential hypertension     Prostate cancer     Type 2 diabetes mellitus without complication, with long-term current use of insulin     Male hypogonadism      DM, controlled  HTN, controlled    PLAN:    1. Discussed that TRT after RALP is controversial.  He's willing to accept the risk. Continue testopel.  Do 4/2020.  2. Continue the IPP for his ED.  3. RTC for testopel.      Copy to:

## 2020-03-26 ENCOUNTER — TELEPHONE (OUTPATIENT)
Dept: UROLOGY | Facility: CLINIC | Age: 60
End: 2020-03-26

## 2020-03-26 NOTE — TELEPHONE ENCOUNTER
Spoke to pt. Informed pt of appointments postphoned due to covid virus. Pt verbalized understanding.

## 2020-03-26 NOTE — TELEPHONE ENCOUNTER
----- Message from Bebe Rico MA sent at 3/26/2020 11:00 AM CDT -----  Contact: [phone]  please call to discuss postponing testopel that he was supposed to do on 4/1/2020 but would like to know if its going to be covered by his insurance     [phone]

## 2020-06-01 ENCOUNTER — RESEARCH ENCOUNTER (OUTPATIENT)
Dept: UROLOGY | Facility: HOSPITAL | Age: 60
End: 2020-06-01

## 2020-06-01 NOTE — PROGRESS NOTES
IPP Study  IRB # 2017.001  Subject # [id number]  06/01/2020      Subject was contacted by phone on 6/1/20 to discuss 6 month follow-up.    Subject agrees to continue participation in the IPP study.    The following questionnaires were completed with the subject:  1. COCO  2. PGII  3. Non-validated questionnaire    Subject is using the IPP device.       Subjects has no AEs or SAEs.      Aayush De Guzman MD

## 2020-06-08 ENCOUNTER — TELEPHONE (OUTPATIENT)
Dept: UROLOGY | Facility: CLINIC | Age: 60
End: 2020-06-08

## 2020-06-09 NOTE — TELEPHONE ENCOUNTER
Returned pts call. Informed pt testopel is pending approval from insurance company. Pt will do labs as ordered. Informed pt I would call him when I received update from prior auth dep with status of auth. Would notify him when final decision made by insurance company. He verbalized understanding. Will wait for my call.

## 2020-06-15 ENCOUNTER — TELEPHONE (OUTPATIENT)
Dept: UROLOGY | Facility: CLINIC | Age: 60
End: 2020-06-15

## 2020-06-15 NOTE — TELEPHONE ENCOUNTER
Returned pts call. [facility] He verbalized understanding. Will call back to reschedule appt with .

## 2020-06-15 NOTE — TELEPHONE ENCOUNTER
----- Message from Shilpi Edouard LPN sent at 6/12/2020 12:59 PM CDT -----  Contact: Rickey  tel:  [phone]    Bety did you have testopel set aside for this patient, I told him I did not think we had any pellets can you call him to cancel his appointment for Wed or do he need to come in to get gel?  ----- Message -----  From: Eduardo Shaver LPN  Sent: 6/12/2020  11:36 AM CDT  To: Buddy QUEEN Staff        ----- Message -----  From: Ginette Merida  Sent: 6/12/2020  11:25 AM CDT  To: Grant MERIDA Staff    Caller says he went to do labs today, but no orders are in the system and nothing scheduled.    Asking you to call him about this today.   Supposed to do labs today.  Wants to do labs at [facility] ([place]).

## 2020-06-17 LAB
ALBUMIN SERPL-MCNC: 4.3 G/DL (ref 3.8–4.9)
ALP SERPL-CCNC: 99 IU/L (ref 39–117)
ALT SERPL-CCNC: 44 IU/L (ref 0–44)
AST SERPL-CCNC: 26 IU/L (ref 0–40)
BASOPHILS # BLD AUTO: 0.1 X10E3/UL (ref 0–0.2)
BASOPHILS NFR BLD AUTO: 1 %
BILIRUB DIRECT SERPL-MCNC: 0.13 MG/DL (ref 0–0.4)
BILIRUB SERPL-MCNC: 0.4 MG/DL (ref 0–1.2)
CHOLEST SERPL-MCNC: 196 MG/DL (ref 100–199)
EOSINOPHIL # BLD AUTO: 0.2 X10E3/UL (ref 0–0.4)
EOSINOPHIL NFR BLD AUTO: 3 %
ERYTHROCYTE [DISTWIDTH] IN BLOOD BY AUTOMATED COUNT: 12.9 % (ref 11.6–15.4)
HBA1C MFR BLD: 8.9 % (ref 4.8–5.6)
HCT VFR BLD AUTO: 47.1 % (ref 37.5–51)
HDLC SERPL-MCNC: 49 MG/DL
HGB BLD-MCNC: 15.2 G/DL (ref 13–17.7)
IMM GRANULOCYTES # BLD AUTO: 0.1 X10E3/UL (ref 0–0.1)
IMM GRANULOCYTES NFR BLD AUTO: 1 %
LDLC SERPL CALC-MCNC: 122 MG/DL (ref 0–99)
LYMPHOCYTES # BLD AUTO: 2.4 X10E3/UL (ref 0.7–3.1)
LYMPHOCYTES NFR BLD AUTO: 35 %
MCH RBC QN AUTO: 28.4 PG (ref 26.6–33)
MCHC RBC AUTO-ENTMCNC: 32.3 G/DL (ref 31.5–35.7)
MCV RBC AUTO: 88 FL (ref 79–97)
MONOCYTES # BLD AUTO: 0.7 X10E3/UL (ref 0.1–0.9)
MONOCYTES NFR BLD AUTO: 10 %
NEUTROPHILS # BLD AUTO: 3.3 X10E3/UL (ref 1.4–7)
NEUTROPHILS NFR BLD AUTO: 50 %
PLATELET # BLD AUTO: 241 X10E3/UL (ref 150–450)
PROT SERPL-MCNC: 7.1 G/DL (ref 6–8.5)
PSA SERPL DL<=0.01 NG/ML-MCNC: <0.014 NG/ML (ref 0–4)
RBC # BLD AUTO: 5.35 X10E6/UL (ref 4.14–5.8)
TESTOST SERPL-MCNC: 275 NG/DL (ref 264–916)
TRIGL SERPL-MCNC: 126 MG/DL (ref 0–149)
VLDLC SERPL CALC-MCNC: 25 MG/DL (ref 5–40)
WBC # BLD AUTO: 6.7 X10E3/UL (ref 3.4–10.8)

## 2021-11-29 NOTE — TELEPHONE ENCOUNTER
----- Message from Yohana Rudolph sent at 6/8/2020  3:03 PM CDT -----  Contact: pt at [phone]  KatherinePt has appt June 17 but will  need to  have testerone level done and maybe something else.  Please call to discuss.  Has labs done on the [facility].  Thanks   restrictive eating malnutrition, bradycardia

## 2022-08-19 ENCOUNTER — TELEPHONE (OUTPATIENT)
Dept: OTOLARYNGOLOGY | Facility: CLINIC | Age: 62
End: 2022-08-19
Payer: COMMERCIAL

## 2022-08-19 NOTE — TELEPHONE ENCOUNTER
S/w wife and scheduled appt for 8/26/22, advised to hold Meclizine 48 hrs before appt. Wife verbalized understanding.

## 2022-08-19 NOTE — TELEPHONE ENCOUNTER
----- Message from Yashira Elaine sent at 8/19/2022  3:07 PM CDT -----  Contact: Pt  Type:  Sooner Appointment Request    Caller is requesting a sooner appointment.  Caller declined first available appointment listed below.  Caller will not accept being placed on the waitlist and is requesting a message be sent to doctor.    Name of Caller:  Pt    When is the first available appointment?  9/22 for Rian, 10/4 Domingo    Symptoms:  Dizziness [R42]  Impacted cerumen of left ear [H61.22]  Fluid level behind tympanic membrane of right ear [H65.91]    Best Call Back Number:  [phone]    Additional Information:  Please call back.  Thanks.

## 2022-08-26 ENCOUNTER — CLINICAL SUPPORT (OUTPATIENT)
Dept: AUDIOLOGY | Facility: CLINIC | Age: 62
End: 2022-08-26
Payer: COMMERCIAL

## 2022-08-26 ENCOUNTER — OFFICE VISIT (OUTPATIENT)
Dept: OTOLARYNGOLOGY | Facility: CLINIC | Age: 62
End: 2022-08-26
Payer: COMMERCIAL

## 2022-08-26 VITALS
BODY MASS INDEX: 34.64 KG/M2 | SYSTOLIC BLOOD PRESSURE: 140 MMHG | TEMPERATURE: 99 F | DIASTOLIC BLOOD PRESSURE: 82 MMHG | WEIGHT: 255.75 LBS | HEIGHT: 72 IN

## 2022-08-26 DIAGNOSIS — R42 DIZZINESS: ICD-10-CM

## 2022-08-26 DIAGNOSIS — H91.90 HEARING DIFFICULTY, UNSPECIFIED LATERALITY: Primary | ICD-10-CM

## 2022-08-26 DIAGNOSIS — R42 DIZZINESS: Primary | ICD-10-CM

## 2022-08-26 DIAGNOSIS — H91.90 HEARING DIFFICULTY, UNSPECIFIED LATERALITY: ICD-10-CM

## 2022-08-26 DIAGNOSIS — H65.91 FLUID LEVEL BEHIND TYMPANIC MEMBRANE OF RIGHT EAR: ICD-10-CM

## 2022-08-26 DIAGNOSIS — H61.23 BILATERAL IMPACTED CERUMEN: ICD-10-CM

## 2022-08-26 PROCEDURE — 99999 PR PBB SHADOW E&M-EST. PATIENT-LVL IV: CPT | Mod: PBBFAC,,, | Performed by: NURSE PRACTITIONER

## 2022-08-26 PROCEDURE — 1160F PR REVIEW ALL MEDS BY PRESCRIBER/CLIN PHARMACIST DOCUMENTED: ICD-10-PCS | Mod: CPTII,S$GLB,, | Performed by: NURSE PRACTITIONER

## 2022-08-26 PROCEDURE — 69210 REMOVE IMPACTED EAR WAX UNI: CPT | Mod: S$GLB,,, | Performed by: NURSE PRACTITIONER

## 2022-08-26 PROCEDURE — 99999 PR PBB SHADOW E&M-EST. PATIENT-LVL II: ICD-10-PCS | Mod: PBBFAC,,,

## 2022-08-26 PROCEDURE — 1159F PR MEDICATION LIST DOCUMENTED IN MEDICAL RECORD: ICD-10-PCS | Mod: CPTII,S$GLB,, | Performed by: NURSE PRACTITIONER

## 2022-08-26 PROCEDURE — 3008F PR BODY MASS INDEX (BMI) DOCUMENTED: ICD-10-PCS | Mod: CPTII,S$GLB,, | Performed by: NURSE PRACTITIONER

## 2022-08-26 PROCEDURE — 3077F SYST BP >= 140 MM HG: CPT | Mod: CPTII,S$GLB,, | Performed by: NURSE PRACTITIONER

## 2022-08-26 PROCEDURE — 4010F ACE/ARB THERAPY RXD/TAKEN: CPT | Mod: CPTII,S$GLB,, | Performed by: NURSE PRACTITIONER

## 2022-08-26 PROCEDURE — 92567 TYMPANOMETRY: CPT | Mod: S$GLB,,, | Performed by: AUDIOLOGIST

## 2022-08-26 PROCEDURE — 1160F RVW MEDS BY RX/DR IN RCRD: CPT | Mod: CPTII,S$GLB,, | Performed by: NURSE PRACTITIONER

## 2022-08-26 PROCEDURE — 99999 PR PBB SHADOW E&M-EST. PATIENT-LVL II: CPT | Mod: PBBFAC,,,

## 2022-08-26 PROCEDURE — 99203 OFFICE O/P NEW LOW 30 MIN: CPT | Mod: 25,S$GLB,, | Performed by: NURSE PRACTITIONER

## 2022-08-26 PROCEDURE — 1159F MED LIST DOCD IN RCRD: CPT | Mod: CPTII,S$GLB,, | Performed by: NURSE PRACTITIONER

## 2022-08-26 PROCEDURE — 3077F PR MOST RECENT SYSTOLIC BLOOD PRESSURE >= 140 MM HG: ICD-10-PCS | Mod: CPTII,S$GLB,, | Performed by: NURSE PRACTITIONER

## 2022-08-26 PROCEDURE — 3079F PR MOST RECENT DIASTOLIC BLOOD PRESSURE 80-89 MM HG: ICD-10-PCS | Mod: CPTII,S$GLB,, | Performed by: NURSE PRACTITIONER

## 2022-08-26 PROCEDURE — 99999 PR PBB SHADOW E&M-EST. PATIENT-LVL IV: ICD-10-PCS | Mod: PBBFAC,,, | Performed by: NURSE PRACTITIONER

## 2022-08-26 PROCEDURE — 3008F BODY MASS INDEX DOCD: CPT | Mod: CPTII,S$GLB,, | Performed by: NURSE PRACTITIONER

## 2022-08-26 PROCEDURE — 69210 PR REMOVAL IMPACTED CERUMEN REQUIRING INSTRUMENTATION, UNILATERAL: ICD-10-PCS | Mod: S$GLB,,, | Performed by: NURSE PRACTITIONER

## 2022-08-26 PROCEDURE — 3079F DIAST BP 80-89 MM HG: CPT | Mod: CPTII,S$GLB,, | Performed by: NURSE PRACTITIONER

## 2022-08-26 PROCEDURE — 92567 PR TYMPA2METRY: ICD-10-PCS | Mod: S$GLB,,, | Performed by: AUDIOLOGIST

## 2022-08-26 PROCEDURE — 99203 PR OFFICE/OUTPT VISIT, NEW, LEVL III, 30-44 MIN: ICD-10-PCS | Mod: 25,S$GLB,, | Performed by: NURSE PRACTITIONER

## 2022-08-26 PROCEDURE — 4010F PR ACE/ARB THEARPY RXD/TAKEN: ICD-10-PCS | Mod: CPTII,S$GLB,, | Performed by: NURSE PRACTITIONER

## 2022-08-26 NOTE — PROGRESS NOTES
"Subjective:       Patient ID: Rickey Rios is a 61 y.o. male.    Chief Complaint: No chief complaint on file.    HPI   Patient is new to ENT, referred by JOSE L Loving for consultation for dizziness. Patient was treated for sinusitis 3 months ago with Zpak, and then again 1 month ago with Zpak. Patient saw JOSE L Loving one week ago for dizziness. Noted to have cerumen impaction and middle ear effusion. Meclizine and Flonase. CT head was ordered. Patient reports dizziness X 2 weeks, caused him to fall after getting out of bed 2 weeks ago. Much better now. Only feels it slightly. Last dose of meclizine was >1 week ago.     Review of Systems   Constitutional: Negative.    HENT: Negative.    Eyes: Negative.    Respiratory: Negative.    Cardiovascular: Negative.    Gastrointestinal: Negative.    Musculoskeletal: Negative.    Integumentary:  Negative.   Neurological: Negative.    Hematological: Negative.    Psychiatric/Behavioral: Negative.          Objective:      Physical Exam  Vitals and nursing note reviewed.   Constitutional:       General: He is not in acute distress.     Appearance: He is well-developed. He is not ill-appearing.   HENT:      Head: Normocephalic and atraumatic.      Right Ear: Hearing, tympanic membrane, ear canal and external ear normal. No middle ear effusion. Tympanic membrane is not erythematous. Tympanic membrane has normal mobility.      Left Ear: Hearing, tympanic membrane, ear canal and external ear normal.  No middle ear effusion. Tympanic membrane is not erythematous. Tympanic membrane has normal mobility.      Ears:      Comments: Type "Ad" tympanograms AU consistent with well-pneumatized mesotympanums and absence of middle ear effusions     Nose: Nose normal.   Eyes:      General: Lids are normal. No scleral icterus.        Right eye: No discharge.         Left eye: No discharge.   Neck:      Trachea: Trachea normal. No tracheal deviation.   Cardiovascular:      Rate and Rhythm: Normal rate. "   Pulmonary:      Effort: Pulmonary effort is normal. No respiratory distress.      Breath sounds: No stridor. No wheezing.   Musculoskeletal:         General: Normal range of motion.      Cervical back: Normal range of motion.   Skin:     General: Skin is warm and dry.   Neurological:      Mental Status: He is alert and oriented to person, place, and time.      Coordination: Coordination is intact.      Gait: Gait normal.   Psychiatric:         Attention and Perception: Attention normal.         Mood and Affect: Mood normal.         Speech: Speech normal.         Behavior: Behavior normal. Behavior is cooperative.       SEPARATE PROCEDURE IN OFFICE:   Procedure: Removal of impacted cerumen, bilateral   Pre Procedure Diagnosis: Cerumen Impaction   Post Procedure Diagnosis: Cerumen Impaction   Verbal informed consent in regards to risk of trauma to ear canal, ear drum or hearing, discomfort during procedure and/or inability to remove cerumen impaction in one session or unforeseen events or complications.   No anesthesia.     Procedure in detail:   Ear canal visualized bilateral with appropriate size ear speculum utilizing Operating Head Binocular Otomicroscope   Utilizing the following:  Ring curet, delicate alligator forceps, and/or suction cannula was used. The impacted cerumen of the ear canals was removed atraumatically. The TM and EAC were then inspected and found to be clear of wax. See description of TMs/EACs in PE above.   Complications: No   Condition: Improved/Good      Negative [place]-Hallpike AU       Assessment:       Problem List Items Addressed This Visit    None     Visit Diagnoses     Dizziness        Impacted cerumen of left ear        Fluid level behind tympanic membrane of right ear              Plan:     If dizziness persists, will need VNG. No evidence of BPPV today.     Audiogram scheduled for next week  Reassured no residual CLIFFORD remains at this time.   Patient encouraged to return to clinic if  symptoms worsen/persist and as needed for further ENT symptoms or concerns.

## 2022-08-26 NOTE — PROGRESS NOTES
Pt referred to Audiology for tympanometry by Lakshmi Modi in ENT.      Results:   Type Ad tympanograms bilaterally    Rec. F/u with ENT to discuss dizziness symptoms.

## 2022-08-27 NOTE — ED NOTES
Pt is now able to lay down in stretcher after dilaudid administration. Pt pain has decreased significantly. Pt is awake and alert with spontaneous respirations. Wife is at bedside. Pt awaiting urology team.    Improved.

## 2022-09-01 ENCOUNTER — CLINICAL SUPPORT (OUTPATIENT)
Dept: AUDIOLOGY | Facility: CLINIC | Age: 62
End: 2022-09-01
Payer: COMMERCIAL

## 2022-09-01 DIAGNOSIS — H93.19 TINNITUS, UNSPECIFIED LATERALITY: ICD-10-CM

## 2022-09-01 DIAGNOSIS — H91.90 HEARING LOSS, UNSPECIFIED HEARING LOSS TYPE, UNSPECIFIED LATERALITY: Primary | ICD-10-CM

## 2022-09-01 DIAGNOSIS — H90.3 SENSORINEURAL HEARING LOSS (SNHL) OF BOTH EARS: Primary | ICD-10-CM

## 2022-09-01 DIAGNOSIS — H91.90 HEARING LOSS, UNSPECIFIED HEARING LOSS TYPE, UNSPECIFIED LATERALITY: ICD-10-CM

## 2022-09-01 PROCEDURE — 92567 PR TYMPA2METRY: ICD-10-PCS | Mod: S$GLB,,, | Performed by: AUDIOLOGIST

## 2022-09-01 PROCEDURE — 92557 COMPREHENSIVE HEARING TEST: CPT | Mod: S$GLB,,, | Performed by: AUDIOLOGIST

## 2022-09-01 PROCEDURE — 99999 PR PBB SHADOW E&M-EST. PATIENT-LVL II: ICD-10-PCS | Mod: PBBFAC,,,

## 2022-09-01 PROCEDURE — 99999 PR PBB SHADOW E&M-EST. PATIENT-LVL II: CPT | Mod: PBBFAC,,,

## 2022-09-01 PROCEDURE — 92567 TYMPANOMETRY: CPT | Mod: S$GLB,,, | Performed by: AUDIOLOGIST

## 2022-09-01 PROCEDURE — 92557 PR COMPREHENSIVE HEARING TEST: ICD-10-PCS | Mod: S$GLB,,, | Performed by: AUDIOLOGIST

## 2023-01-23 ENCOUNTER — TELEPHONE (OUTPATIENT)
Dept: UROLOGY | Facility: HOSPITAL | Age: 63
End: 2023-01-23
Payer: COMMERCIAL

## 2023-01-23 NOTE — TELEPHONE ENCOUNTER
IPP Study  IRB # 2017.001  Subject # [id number]  01/23/2023      Subject was contacted by phone on 1/23/23 to discuss 24 month follow-up.    Subject agrees to continue participation in the IPP study.    The following questionnaires were completed with the subject:  1. COCO  2. PGII  3. Non-validated questionnaire    Subject is using the IPP device.       Subjects has no AEs or SAEs.       Josse Hayward MD

## 2024-02-12 DIAGNOSIS — U07.1 COVID-19 VIRUS DETECTED: ICD-10-CM

## (undated) DEVICE — SEE MEDLINE ITEM 146417

## (undated) DEVICE — CASSETTE DRAPE

## (undated) DEVICE — NDL INSUF ULTRA VERESS 120MM

## (undated) DEVICE — CLIP HEMO-LOK MLX LARGE LF

## (undated) DEVICE — LEGGINGS 48X31 INCH

## (undated) DEVICE — SCISSOR 5MMX35CM DIRECT DRIVE

## (undated) DEVICE — RETRACTOR LONE STAR 28.3X18.3

## (undated) DEVICE — SEE MEDLINE ITEM 146292

## (undated) DEVICE — SUT MONOCRYL 3-0 UNDYED RB1

## (undated) DEVICE — SYR 50CC LL

## (undated) DEVICE — SEE MEDLINE ITEM 157206

## (undated) DEVICE — SYR 10CC LUER LOCK

## (undated) DEVICE — DRAPE ABDOMINAL TIBURON 14X11

## (undated) DEVICE — TRAY MINOR GEN SURG

## (undated) DEVICE — FORCEP STRAIGHT DISP

## (undated) DEVICE — DRAPE SCOPE PILLOW WARMER

## (undated) DEVICE — NDL HYPO REG 25G X 1 1/2

## (undated) DEVICE — IRRIGATOR ENDOSCOPY DISP.

## (undated) DEVICE — SUT MONOCRYL 3-0 RB1

## (undated) DEVICE — SYR 30CC LUER LOCK

## (undated) DEVICE — TIP EXTENDER REAR 1.5CM

## (undated) DEVICE — ELECTRODE REM PLYHSV RETURN 9

## (undated) DEVICE — TROCAR ENDOPATH XCEL 12X100MM

## (undated) DEVICE — DRAIN CHANNEL ROUND 15FR

## (undated) DEVICE — NDL HYPODERMIC BLUNT 18G 1.5IN

## (undated) DEVICE — SEE MEDLINE ITEM 152529

## (undated) DEVICE — COVER LIGHT HANDLE

## (undated) DEVICE — PORT AIRSEAL 12/120MM LPI

## (undated) DEVICE — TRAY FOLEY 16FR INFECTION CONT

## (undated) DEVICE — Device

## (undated) DEVICE — LINER GLOVE POWDERFREE 8

## (undated) DEVICE — SYR LUER LOCK 1CC

## (undated) DEVICE — SET TRI-LUMEN FILTERED TUBE

## (undated) DEVICE — SUT 2-0 VICRYL / SH (J417)

## (undated) DEVICE — TIPS REAR EXTENDER

## (undated) DEVICE — SUT MCRYL PLUS 4-0 PS2 27IN

## (undated) DEVICE — ADHESIVE DERMABOND ADVANCED

## (undated) DEVICE — BLADE SURG #15 CARBON STEEL

## (undated) DEVICE — TROCAR ENDOPATH XCEL 5MM 7.5CM

## (undated) DEVICE — SET DECANTER MEDICHOICE

## (undated) DEVICE — CLIPPER BLADE MOD 4406 (CAREF)

## (undated) DEVICE — SOL NS 1000CC

## (undated) DEVICE — SUT PDS II 0 CT-1 VIL MONO

## (undated) DEVICE — SUT CHROMIC 3-0 SH 27IN GUT

## (undated) DEVICE — LUBRICANT SURGILUBE 2 OZ

## (undated) DEVICE — CORD BIPOLAR 12 FOOT

## (undated) DEVICE — SOL WATER STRL IRR 1000ML

## (undated) DEVICE — BAG TISS RETRV MONARCH 10MM

## (undated) DEVICE — SYR 1CC TB SG 27GX1/2

## (undated) DEVICE — SUT 2/0 36IN COATED VICRYL

## (undated) DEVICE — GAUZE SPONGE PEANUT STRL

## (undated) DEVICE — KIT ROBOTIC 4 ARM DA VINCI SI

## (undated) DEVICE — SEE MEDLINE ITEM 146347

## (undated) DEVICE — SEE MEDLINE ITEM 157148

## (undated) DEVICE — SET BLD COLL SAFETY 21G X 3/4

## (undated) DEVICE — RETRACTOR STAY SPIRA  20MM

## (undated) DEVICE — SPONGE DERMACEA GAUZE 4X4

## (undated) DEVICE — SEE MEDLINE ITEM 154981

## (undated) DEVICE — DRESSING XEROFORM FOIL PK 1X8

## (undated) DEVICE — GOWN SURGICAL X-LARGE

## (undated) DEVICE — CUP MEDICINE STERILE 2OZ

## (undated) DEVICE — DRAPE INCISE IOBAN 2 23X17IN

## (undated) DEVICE — SUT VICRYL 3-0 27 SH

## (undated) DEVICE — GAUZE FLUFF XXLG 36X36 2 PLY

## (undated) DEVICE — SOL STERILE WATER 10ML

## (undated) DEVICE — SUT VICRYL 4-0 RB1 27IN UD

## (undated) DEVICE — GLOVE SURG BIOGEL LATEX SZ 7.5

## (undated) DEVICE — PACK SCROTO-PAK LONE STAR

## (undated) DEVICE — SYR 50ML CATH TIP

## (undated) DEVICE — DRAPE STERI INSTRUMENT 1018

## (undated) DEVICE — BRIEF MESH LARGE

## (undated) DEVICE — EVACUATOR WOUND BULB 100CC

## (undated) DEVICE — BLADE SURG CARBON STEEL SZ11

## (undated) DEVICE — KIT ANTIFOG

## (undated) DEVICE — SEE L#95700

## (undated) DEVICE — TROCAR ENDOPATH XCEL 5X100MM

## (undated) DEVICE — COVER TIP CURVED SCISSORS XI

## (undated) DEVICE — SEE MEDLINE ITEM 152487

## (undated) DEVICE — GAUZE SPONGE 4X4 12PLY

## (undated) DEVICE — SOL ELECTROLUBE ANTI-STIC

## (undated) DEVICE — PANTIES FEMININE NAPKIN LG/XLG